# Patient Record
Sex: MALE | Race: WHITE | NOT HISPANIC OR LATINO
[De-identification: names, ages, dates, MRNs, and addresses within clinical notes are randomized per-mention and may not be internally consistent; named-entity substitution may affect disease eponyms.]

---

## 2017-08-16 ENCOUNTER — APPOINTMENT (OUTPATIENT)
Dept: ELECTROPHYSIOLOGY | Facility: CLINIC | Age: 82
End: 2017-08-16
Payer: MEDICARE

## 2017-08-16 VITALS
HEIGHT: 68 IN | HEART RATE: 73 BPM | SYSTOLIC BLOOD PRESSURE: 106 MMHG | BODY MASS INDEX: 25.61 KG/M2 | WEIGHT: 169 LBS | DIASTOLIC BLOOD PRESSURE: 73 MMHG

## 2017-08-16 PROCEDURE — 93283 PRGRMG EVAL IMPLANTABLE DFB: CPT

## 2017-11-21 ENCOUNTER — APPOINTMENT (OUTPATIENT)
Dept: ELECTROPHYSIOLOGY | Facility: CLINIC | Age: 82
End: 2017-11-21
Payer: MEDICARE

## 2017-11-21 VITALS — HEART RATE: 79 BPM | OXYGEN SATURATION: 98 % | DIASTOLIC BLOOD PRESSURE: 67 MMHG | SYSTOLIC BLOOD PRESSURE: 109 MMHG

## 2017-11-21 PROCEDURE — 93283 PRGRMG EVAL IMPLANTABLE DFB: CPT

## 2017-11-21 PROCEDURE — 93290 INTERROG DEV EVAL ICPMS IP: CPT | Mod: 26

## 2018-02-23 ENCOUNTER — APPOINTMENT (OUTPATIENT)
Dept: ELECTROPHYSIOLOGY | Facility: CLINIC | Age: 83
End: 2018-02-23
Payer: MEDICARE

## 2018-02-23 VITALS
DIASTOLIC BLOOD PRESSURE: 71 MMHG | BODY MASS INDEX: 25.61 KG/M2 | HEART RATE: 73 BPM | SYSTOLIC BLOOD PRESSURE: 112 MMHG | HEIGHT: 68 IN | WEIGHT: 169 LBS

## 2018-02-23 PROCEDURE — 93283 PRGRMG EVAL IMPLANTABLE DFB: CPT

## 2018-02-23 RX ORDER — CARVEDILOL 3.12 MG/1
3.12 TABLET, FILM COATED ORAL
Refills: 0 | Status: DISCONTINUED | COMMUNITY

## 2018-05-25 ENCOUNTER — APPOINTMENT (OUTPATIENT)
Dept: ELECTROPHYSIOLOGY | Facility: CLINIC | Age: 83
End: 2018-05-25
Payer: MEDICARE

## 2018-05-25 VITALS
HEART RATE: 74 BPM | SYSTOLIC BLOOD PRESSURE: 104 MMHG | HEIGHT: 68 IN | DIASTOLIC BLOOD PRESSURE: 72 MMHG | BODY MASS INDEX: 24.55 KG/M2 | WEIGHT: 162 LBS

## 2018-05-25 PROCEDURE — 93283 PRGRMG EVAL IMPLANTABLE DFB: CPT

## 2018-05-25 PROCEDURE — 93290 INTERROG DEV EVAL ICPMS IP: CPT | Mod: 26

## 2018-09-04 ENCOUNTER — APPOINTMENT (OUTPATIENT)
Dept: ELECTROPHYSIOLOGY | Facility: CLINIC | Age: 83
End: 2018-09-04
Payer: MEDICARE

## 2018-09-04 VITALS
HEART RATE: 75 BPM | BODY MASS INDEX: 24.25 KG/M2 | WEIGHT: 160 LBS | HEIGHT: 68 IN | DIASTOLIC BLOOD PRESSURE: 76 MMHG | SYSTOLIC BLOOD PRESSURE: 117 MMHG

## 2018-09-04 PROCEDURE — 93290 INTERROG DEV EVAL ICPMS IP: CPT | Mod: 26

## 2018-09-04 PROCEDURE — 93283 PRGRMG EVAL IMPLANTABLE DFB: CPT

## 2018-12-07 ENCOUNTER — APPOINTMENT (OUTPATIENT)
Dept: ELECTROPHYSIOLOGY | Facility: CLINIC | Age: 83
End: 2018-12-07
Payer: MEDICARE

## 2018-12-07 PROCEDURE — 93290 INTERROG DEV EVAL ICPMS IP: CPT | Mod: 26

## 2018-12-07 PROCEDURE — 93283 PRGRMG EVAL IMPLANTABLE DFB: CPT

## 2019-05-16 ENCOUNTER — APPOINTMENT (OUTPATIENT)
Dept: ELECTROPHYSIOLOGY | Facility: CLINIC | Age: 84
End: 2019-05-16
Payer: MEDICARE

## 2019-05-16 VITALS
WEIGHT: 161 LBS | BODY MASS INDEX: 24.4 KG/M2 | HEART RATE: 72 BPM | HEIGHT: 68 IN | DIASTOLIC BLOOD PRESSURE: 71 MMHG | SYSTOLIC BLOOD PRESSURE: 110 MMHG

## 2019-05-16 PROCEDURE — 93290 INTERROG DEV EVAL ICPMS IP: CPT | Mod: 26

## 2019-05-16 PROCEDURE — 93283 PRGRMG EVAL IMPLANTABLE DFB: CPT

## 2019-07-16 ENCOUNTER — APPOINTMENT (OUTPATIENT)
Dept: ELECTROPHYSIOLOGY | Facility: CLINIC | Age: 84
End: 2019-07-16
Payer: MEDICARE

## 2019-07-16 VITALS
DIASTOLIC BLOOD PRESSURE: 63 MMHG | WEIGHT: 157.44 LBS | HEIGHT: 68 IN | HEART RATE: 79 BPM | BODY MASS INDEX: 23.86 KG/M2 | SYSTOLIC BLOOD PRESSURE: 98 MMHG

## 2019-07-16 PROCEDURE — 93290 INTERROG DEV EVAL ICPMS IP: CPT | Mod: 26

## 2019-07-16 PROCEDURE — 93283 PRGRMG EVAL IMPLANTABLE DFB: CPT

## 2019-08-28 ENCOUNTER — APPOINTMENT (OUTPATIENT)
Dept: ELECTROPHYSIOLOGY | Facility: CLINIC | Age: 84
End: 2019-08-28
Payer: MEDICARE

## 2019-08-28 VITALS
BODY MASS INDEX: 23.79 KG/M2 | HEART RATE: 71 BPM | SYSTOLIC BLOOD PRESSURE: 97 MMHG | DIASTOLIC BLOOD PRESSURE: 65 MMHG | WEIGHT: 157 LBS | HEIGHT: 68 IN

## 2019-08-28 PROCEDURE — 93290 INTERROG DEV EVAL ICPMS IP: CPT | Mod: 26

## 2019-08-28 PROCEDURE — 93283 PRGRMG EVAL IMPLANTABLE DFB: CPT

## 2019-10-09 ENCOUNTER — APPOINTMENT (OUTPATIENT)
Dept: ELECTROPHYSIOLOGY | Facility: CLINIC | Age: 84
End: 2019-10-09
Payer: MEDICARE

## 2019-10-09 VITALS
BODY MASS INDEX: 23.64 KG/M2 | WEIGHT: 156 LBS | DIASTOLIC BLOOD PRESSURE: 56 MMHG | HEART RATE: 73 BPM | HEIGHT: 68 IN | SYSTOLIC BLOOD PRESSURE: 100 MMHG | OXYGEN SATURATION: 100 %

## 2019-10-09 PROCEDURE — 93280 PM DEVICE PROGR EVAL DUAL: CPT

## 2019-11-13 ENCOUNTER — APPOINTMENT (OUTPATIENT)
Dept: ELECTROPHYSIOLOGY | Facility: CLINIC | Age: 84
End: 2019-11-13
Payer: MEDICARE

## 2019-11-13 VITALS
WEIGHT: 163 LBS | SYSTOLIC BLOOD PRESSURE: 119 MMHG | HEART RATE: 76 BPM | BODY MASS INDEX: 24.78 KG/M2 | DIASTOLIC BLOOD PRESSURE: 72 MMHG

## 2019-11-13 DIAGNOSIS — C61 MALIGNANT NEOPLASM OF PROSTATE: ICD-10-CM

## 2019-11-13 DIAGNOSIS — M10.9 GOUT, UNSPECIFIED: ICD-10-CM

## 2019-11-13 PROCEDURE — 93283 PRGRMG EVAL IMPLANTABLE DFB: CPT

## 2019-11-21 ENCOUNTER — APPOINTMENT (OUTPATIENT)
Dept: ELECTROPHYSIOLOGY | Facility: CLINIC | Age: 84
End: 2019-11-21
Payer: MEDICARE

## 2019-11-21 VITALS
SYSTOLIC BLOOD PRESSURE: 100 MMHG | DIASTOLIC BLOOD PRESSURE: 58 MMHG | HEART RATE: 77 BPM | OXYGEN SATURATION: 98 % | HEIGHT: 66 IN | WEIGHT: 158 LBS | BODY MASS INDEX: 25.39 KG/M2

## 2019-11-21 PROCEDURE — 93283 PRGRMG EVAL IMPLANTABLE DFB: CPT

## 2019-11-26 ENCOUNTER — APPOINTMENT (OUTPATIENT)
Dept: ELECTROPHYSIOLOGY | Facility: CLINIC | Age: 84
End: 2019-11-26

## 2019-12-02 ENCOUNTER — APPOINTMENT (OUTPATIENT)
Dept: ELECTROPHYSIOLOGY | Facility: CLINIC | Age: 84
End: 2019-12-02
Payer: MEDICARE

## 2019-12-02 VITALS
HEART RATE: 78 BPM | WEIGHT: 159.38 LBS | SYSTOLIC BLOOD PRESSURE: 101 MMHG | DIASTOLIC BLOOD PRESSURE: 70 MMHG | BODY MASS INDEX: 25.61 KG/M2 | HEIGHT: 66 IN | OXYGEN SATURATION: 99 %

## 2019-12-02 PROCEDURE — 93290 INTERROG DEV EVAL ICPMS IP: CPT

## 2019-12-02 PROCEDURE — 93283 PRGRMG EVAL IMPLANTABLE DFB: CPT

## 2019-12-04 ENCOUNTER — OUTPATIENT (OUTPATIENT)
Dept: OUTPATIENT SERVICES | Facility: HOSPITAL | Age: 84
LOS: 1 days | End: 2019-12-04
Payer: MEDICARE

## 2019-12-04 VITALS
DIASTOLIC BLOOD PRESSURE: 59 MMHG | RESPIRATION RATE: 18 BRPM | OXYGEN SATURATION: 100 % | WEIGHT: 142.42 LBS | SYSTOLIC BLOOD PRESSURE: 107 MMHG | HEIGHT: 65 IN | TEMPERATURE: 97 F | HEART RATE: 78 BPM

## 2019-12-04 DIAGNOSIS — Z90.89 ACQUIRED ABSENCE OF OTHER ORGANS: Chronic | ICD-10-CM

## 2019-12-04 DIAGNOSIS — Z98.890 OTHER SPECIFIED POSTPROCEDURAL STATES: Chronic | ICD-10-CM

## 2019-12-04 DIAGNOSIS — Z01.818 ENCOUNTER FOR OTHER PREPROCEDURAL EXAMINATION: ICD-10-CM

## 2019-12-04 DIAGNOSIS — I47.2 VENTRICULAR TACHYCARDIA: ICD-10-CM

## 2019-12-04 DIAGNOSIS — Z95.810 PRESENCE OF AUTOMATIC (IMPLANTABLE) CARDIAC DEFIBRILLATOR: Chronic | ICD-10-CM

## 2019-12-04 DIAGNOSIS — Z87.2 PERSONAL HISTORY OF DISEASES OF THE SKIN AND SUBCUTANEOUS TISSUE: Chronic | ICD-10-CM

## 2019-12-04 DIAGNOSIS — Z41.9 ENCOUNTER FOR PROCEDURE FOR PURPOSES OTHER THAN REMEDYING HEALTH STATE, UNSPECIFIED: Chronic | ICD-10-CM

## 2019-12-04 LAB
ANION GAP SERPL CALC-SCNC: 4 MMOL/L — LOW (ref 5–17)
BASOPHILS # BLD AUTO: 0.05 K/UL — SIGNIFICANT CHANGE UP (ref 0–0.2)
BASOPHILS NFR BLD AUTO: 0.6 % — SIGNIFICANT CHANGE UP (ref 0–2)
BUN SERPL-MCNC: 25 MG/DL — HIGH (ref 7–23)
CALCIUM SERPL-MCNC: 9.2 MG/DL — SIGNIFICANT CHANGE UP (ref 8.5–10.1)
CHLORIDE SERPL-SCNC: 105 MMOL/L — SIGNIFICANT CHANGE UP (ref 96–108)
CO2 SERPL-SCNC: 29 MMOL/L — SIGNIFICANT CHANGE UP (ref 22–31)
CREAT SERPL-MCNC: 0.96 MG/DL — SIGNIFICANT CHANGE UP (ref 0.5–1.3)
EOSINOPHIL # BLD AUTO: 0.4 K/UL — SIGNIFICANT CHANGE UP (ref 0–0.5)
EOSINOPHIL NFR BLD AUTO: 5.1 % — SIGNIFICANT CHANGE UP (ref 0–6)
GLUCOSE SERPL-MCNC: 110 MG/DL — HIGH (ref 70–99)
HCT VFR BLD CALC: 42 % — SIGNIFICANT CHANGE UP (ref 39–50)
HGB BLD-MCNC: 13.5 G/DL — SIGNIFICANT CHANGE UP (ref 13–17)
IMM GRANULOCYTES NFR BLD AUTO: 0.3 % — SIGNIFICANT CHANGE UP (ref 0–1.5)
LYMPHOCYTES # BLD AUTO: 1.29 K/UL — SIGNIFICANT CHANGE UP (ref 1–3.3)
LYMPHOCYTES # BLD AUTO: 16.6 % — SIGNIFICANT CHANGE UP (ref 13–44)
MCHC RBC-ENTMCNC: 29.9 PG — SIGNIFICANT CHANGE UP (ref 27–34)
MCHC RBC-ENTMCNC: 32.1 GM/DL — SIGNIFICANT CHANGE UP (ref 32–36)
MCV RBC AUTO: 92.9 FL — SIGNIFICANT CHANGE UP (ref 80–100)
MONOCYTES # BLD AUTO: 0.7 K/UL — SIGNIFICANT CHANGE UP (ref 0–0.9)
MONOCYTES NFR BLD AUTO: 9 % — SIGNIFICANT CHANGE UP (ref 2–14)
MRSA PCR RESULT.: SIGNIFICANT CHANGE UP
NEUTROPHILS # BLD AUTO: 5.33 K/UL — SIGNIFICANT CHANGE UP (ref 1.8–7.4)
NEUTROPHILS NFR BLD AUTO: 68.4 % — SIGNIFICANT CHANGE UP (ref 43–77)
PLATELET # BLD AUTO: 224 K/UL — SIGNIFICANT CHANGE UP (ref 150–400)
POTASSIUM SERPL-MCNC: 4.4 MMOL/L — SIGNIFICANT CHANGE UP (ref 3.5–5.3)
POTASSIUM SERPL-SCNC: 4.4 MMOL/L — SIGNIFICANT CHANGE UP (ref 3.5–5.3)
RBC # BLD: 4.52 M/UL — SIGNIFICANT CHANGE UP (ref 4.2–5.8)
RBC # FLD: 13.4 % — SIGNIFICANT CHANGE UP (ref 10.3–14.5)
S AUREUS DNA NOSE QL NAA+PROBE: SIGNIFICANT CHANGE UP
SODIUM SERPL-SCNC: 138 MMOL/L — SIGNIFICANT CHANGE UP (ref 135–145)
WBC # BLD: 7.79 K/UL — SIGNIFICANT CHANGE UP (ref 3.8–10.5)
WBC # FLD AUTO: 7.79 K/UL — SIGNIFICANT CHANGE UP (ref 3.8–10.5)

## 2019-12-04 PROCEDURE — 36415 COLL VENOUS BLD VENIPUNCTURE: CPT

## 2019-12-04 PROCEDURE — 93010 ELECTROCARDIOGRAM REPORT: CPT

## 2019-12-04 PROCEDURE — 71046 X-RAY EXAM CHEST 2 VIEWS: CPT

## 2019-12-04 PROCEDURE — 87641 MR-STAPH DNA AMP PROBE: CPT

## 2019-12-04 PROCEDURE — 85025 COMPLETE CBC W/AUTO DIFF WBC: CPT

## 2019-12-04 PROCEDURE — 71046 X-RAY EXAM CHEST 2 VIEWS: CPT | Mod: 26

## 2019-12-04 PROCEDURE — 80048 BASIC METABOLIC PNL TOTAL CA: CPT

## 2019-12-04 PROCEDURE — 93005 ELECTROCARDIOGRAM TRACING: CPT

## 2019-12-04 PROCEDURE — G0463: CPT | Mod: 25

## 2019-12-04 PROCEDURE — 87640 STAPH A DNA AMP PROBE: CPT

## 2019-12-04 NOTE — H&P PST ADULT - ASSESSMENT
86 yo male scheduled for ICD generator change on 12/6/19 with Dr Jackson     1. CBC w diff, BMP, MRSA nares  2. EKG  3. discussed EZ sponges & mupirocin instructions  4. medication & day of procedure instructions as per EP staff  5. CXR 86 yo male scheduled for ICD generator change on 12/6/19 with Dr Jackson     1. CBC w diff, BMP, MRSA nares  2. EKG  3. discussed EZ sponges & mupirocin instructions  4. medication & day of procedure instructions as per EP staff. copy of printed instructions from EP given to patient  5. CXR

## 2019-12-04 NOTE — H&P PST ADULT - NSICDXPASTSURGICALHX_GEN_ALL_CORE_FT
PAST SURGICAL HISTORY:  Elective surgery right middle finger surgery, no hardware    H/O pilonidal cyst     H/O prostate biopsy cancer treated with prostate seeding    History of tonsillectomy as child    Implantable cardioverter-defibrillator (ICD) in situ 2008

## 2019-12-04 NOTE — H&P PST ADULT - NSANTHAGERD_ENT_A_CORE
-- Message is from the [facility]--    Provider paged via [employer] Interactive Documentation - The below message was copied and pasted from a [employer] page:    [phone] ACC URGENT CALLER NAME: DONTRELL RE: DONTRELL MONTGOMERY PATIENT 1958 PATIENT PCP: MILAD BANERJEE PATIENT CALLED REGARDING A MEDICATION REFILL DUE TO PATIENT BEING OUT OF MEDICATION FOR TRAZODONE (DESYREL) 150 MG TABLET NEED MORE DOSAGE  MG, AND ALSO FOR REFILL FOR VENLAFAXINE XR (EFFEXOR XR) 150 MG 24 HR CAPSULE. PATIENT ASSIST TO CALL PCP PATIENT IS AGITATED, PLEASE CONTACT PATIENT OR SENT SCRIPT [facility] ([facility] [phone]. FORMID:[id number]      
Yes

## 2019-12-04 NOTE — H&P PST ADULT - HISTORY OF PRESENT ILLNESS
84 yo male presents to PST. Wife Debbi reports PPM/ ICD originally placed 2008 for ventricular tachycardia. Pt states he is coming in for battery replacement. Denies chest pain, SOB or syncope.

## 2019-12-04 NOTE — H&P PST ADULT - NSANTHOSAYNRD_GEN_A_CORE
No. TACOS screening performed.  STOP BANG Legend: 0-2 = LOW Risk; 3-4 = INTERMEDIATE Risk; 5-8 = HIGH Risk

## 2019-12-04 NOTE — H&P PST ADULT - NSICDXPASTMEDICALHX_GEN_ALL_CORE_FT
PAST MEDICAL HISTORY:  H/O ventricular tachycardia PPM placed 2008    Hypertension     Myocardial infarction 54 years old    Prostate cancer

## 2019-12-05 DIAGNOSIS — Z01.818 ENCOUNTER FOR OTHER PREPROCEDURAL EXAMINATION: ICD-10-CM

## 2019-12-05 DIAGNOSIS — I47.2 VENTRICULAR TACHYCARDIA: ICD-10-CM

## 2019-12-05 PROBLEM — I10 ESSENTIAL (PRIMARY) HYPERTENSION: Chronic | Status: ACTIVE | Noted: 2019-12-04

## 2019-12-05 PROBLEM — Z86.79 PERSONAL HISTORY OF OTHER DISEASES OF THE CIRCULATORY SYSTEM: Chronic | Status: ACTIVE | Noted: 2019-12-04

## 2019-12-05 PROBLEM — I21.9 ACUTE MYOCARDIAL INFARCTION, UNSPECIFIED: Chronic | Status: ACTIVE | Noted: 2019-12-04

## 2019-12-05 PROBLEM — C61 MALIGNANT NEOPLASM OF PROSTATE: Chronic | Status: ACTIVE | Noted: 2019-12-04

## 2019-12-06 ENCOUNTER — OUTPATIENT (OUTPATIENT)
Dept: OUTPATIENT SERVICES | Facility: HOSPITAL | Age: 84
LOS: 1 days | Discharge: HOME | End: 2019-12-06
Payer: MEDICARE

## 2019-12-06 VITALS
SYSTOLIC BLOOD PRESSURE: 123 MMHG | WEIGHT: 143.3 LBS | DIASTOLIC BLOOD PRESSURE: 70 MMHG | TEMPERATURE: 97 F | OXYGEN SATURATION: 100 % | HEIGHT: 65 IN | HEART RATE: 72 BPM | RESPIRATION RATE: 18 BRPM

## 2019-12-06 VITALS
SYSTOLIC BLOOD PRESSURE: 113 MMHG | HEART RATE: 70 BPM | DIASTOLIC BLOOD PRESSURE: 69 MMHG | OXYGEN SATURATION: 100 % | RESPIRATION RATE: 18 BRPM

## 2019-12-06 DIAGNOSIS — I47.2 VENTRICULAR TACHYCARDIA: ICD-10-CM

## 2019-12-06 DIAGNOSIS — Z98.890 OTHER SPECIFIED POSTPROCEDURAL STATES: Chronic | ICD-10-CM

## 2019-12-06 DIAGNOSIS — Z87.2 PERSONAL HISTORY OF DISEASES OF THE SKIN AND SUBCUTANEOUS TISSUE: Chronic | ICD-10-CM

## 2019-12-06 DIAGNOSIS — Z95.810 PRESENCE OF AUTOMATIC (IMPLANTABLE) CARDIAC DEFIBRILLATOR: Chronic | ICD-10-CM

## 2019-12-06 DIAGNOSIS — Z41.9 ENCOUNTER FOR PROCEDURE FOR PURPOSES OTHER THAN REMEDYING HEALTH STATE, UNSPECIFIED: Chronic | ICD-10-CM

## 2019-12-06 DIAGNOSIS — Z90.89 ACQUIRED ABSENCE OF OTHER ORGANS: Chronic | ICD-10-CM

## 2019-12-06 PROCEDURE — 33228 REMV&REPLC PM GEN DUAL LEAD: CPT

## 2019-12-06 PROCEDURE — C1721: CPT

## 2019-12-06 PROCEDURE — 33263 RMVL & RPLCMT DFB GEN 2 LEAD: CPT

## 2019-12-06 PROCEDURE — 93005 ELECTROCARDIOGRAM TRACING: CPT | Mod: XU

## 2019-12-06 PROCEDURE — 93010 ELECTROCARDIOGRAM REPORT: CPT

## 2019-12-06 RX ORDER — CEPHALEXIN 500 MG
500 CAPSULE ORAL EVERY 8 HOURS
Refills: 0 | Status: DISCONTINUED | OUTPATIENT
Start: 2019-12-06 | End: 2019-12-06

## 2019-12-06 RX ORDER — CEFAZOLIN SODIUM 1 G
2000 VIAL (EA) INJECTION ONCE
Refills: 0 | Status: COMPLETED | OUTPATIENT
Start: 2019-12-06 | End: 2019-12-06

## 2019-12-06 RX ORDER — CEPHALEXIN 500 MG
1 CAPSULE ORAL
Qty: 0 | Refills: 0 | DISCHARGE
Start: 2019-12-06

## 2019-12-06 RX ADMIN — Medication 100 MILLIGRAM(S): at 08:56

## 2019-12-06 NOTE — ASU PATIENT PROFILE, ADULT - PSH
Elective surgery  right middle finger surgery, no hardware  H/O pilonidal cyst    H/O prostate biopsy  cancer treated with prostate seeding  History of tonsillectomy  as child  Implantable cardioverter-defibrillator (ICD) in situ  2008

## 2019-12-06 NOTE — PACU DISCHARGE NOTE - COMMENTS
pt discharged home via wheelchair, accompanied by spouse, pt received discharge instructions, fallow up appointment, IV removed, bedside monitor given to the patient.

## 2019-12-06 NOTE — PROCEDURE NOTE - NSICDXPROCEDURE_GEN_ALL_CORE_FT
PROCEDURES:  Replacement, pulse generator for dual-lead ICD system 06-Dec-2019 09:56:00  rAis Jackson

## 2019-12-06 NOTE — ASU PATIENT PROFILE, ADULT - PMH
H/O ventricular tachycardia  PPM placed 2008  Hypertension    Myocardial infarction  54 years old  Prostate cancer

## 2019-12-11 DIAGNOSIS — Z85.048 PERSONAL HISTORY OF OTHER MALIGNANT NEOPLASM OF RECTUM, RECTOSIGMOID JUNCTION, AND ANUS: ICD-10-CM

## 2019-12-11 DIAGNOSIS — Z88.1 ALLERGY STATUS TO OTHER ANTIBIOTIC AGENTS STATUS: ICD-10-CM

## 2019-12-11 DIAGNOSIS — Z45.02 ENCOUNTER FOR ADJUSTMENT AND MANAGEMENT OF AUTOMATIC IMPLANTABLE CARDIAC DEFIBRILLATOR: ICD-10-CM

## 2019-12-11 DIAGNOSIS — I10 ESSENTIAL (PRIMARY) HYPERTENSION: ICD-10-CM

## 2019-12-11 DIAGNOSIS — Z85.46 PERSONAL HISTORY OF MALIGNANT NEOPLASM OF PROSTATE: ICD-10-CM

## 2019-12-11 DIAGNOSIS — I25.2 OLD MYOCARDIAL INFARCTION: ICD-10-CM

## 2019-12-20 ENCOUNTER — APPOINTMENT (OUTPATIENT)
Dept: ELECTROPHYSIOLOGY | Facility: CLINIC | Age: 84
End: 2019-12-20
Payer: MEDICARE

## 2019-12-20 VITALS — SYSTOLIC BLOOD PRESSURE: 99 MMHG | DIASTOLIC BLOOD PRESSURE: 64 MMHG | OXYGEN SATURATION: 98 % | HEART RATE: 75 BPM

## 2019-12-20 PROCEDURE — 93283 PRGRMG EVAL IMPLANTABLE DFB: CPT

## 2020-03-04 NOTE — ASU PREOP CHECKLIST - HOW ADMINISTERED
Overall: No fever/chills/sweats.    Tolerating PO diet  Any cough? No    Incision: Denies redness, swelling or drainage    Pain Control: Controlled with oral analgesic                        Needs refill    Nausea/Vomitting:  Denies    Bowel Movements: Regular      Plans for returning to work:  Patient has returned to work.  Patient plans on returning:      Self Administrated

## 2020-03-17 ENCOUNTER — APPOINTMENT (OUTPATIENT)
Dept: ELECTROPHYSIOLOGY | Facility: CLINIC | Age: 85
End: 2020-03-17
Payer: MEDICARE

## 2020-03-17 PROCEDURE — 93295 DEV INTERROG REMOTE 1/2/MLT: CPT

## 2020-03-17 PROCEDURE — 93296 REM INTERROG EVL PM/IDS: CPT

## 2020-06-23 ENCOUNTER — APPOINTMENT (OUTPATIENT)
Dept: ELECTROPHYSIOLOGY | Facility: CLINIC | Age: 85
End: 2020-06-23

## 2020-06-25 ENCOUNTER — APPOINTMENT (OUTPATIENT)
Dept: ELECTROPHYSIOLOGY | Facility: CLINIC | Age: 85
End: 2020-06-25
Payer: MEDICARE

## 2020-06-26 PROCEDURE — 93296 REM INTERROG EVL PM/IDS: CPT

## 2020-06-26 PROCEDURE — 93295 DEV INTERROG REMOTE 1/2/MLT: CPT

## 2020-07-03 ENCOUNTER — EMERGENCY (EMERGENCY)
Facility: HOSPITAL | Age: 85
LOS: 0 days | Discharge: HOME | End: 2020-07-04
Attending: EMERGENCY MEDICINE
Payer: MEDICARE

## 2020-07-03 VITALS
OXYGEN SATURATION: 100 % | DIASTOLIC BLOOD PRESSURE: 66 MMHG | RESPIRATION RATE: 18 BRPM | SYSTOLIC BLOOD PRESSURE: 123 MMHG | HEART RATE: 85 BPM | TEMPERATURE: 98 F

## 2020-07-03 DIAGNOSIS — Z95.810 PRESENCE OF AUTOMATIC (IMPLANTABLE) CARDIAC DEFIBRILLATOR: Chronic | ICD-10-CM

## 2020-07-03 DIAGNOSIS — I47.2 VENTRICULAR TACHYCARDIA: ICD-10-CM

## 2020-07-03 DIAGNOSIS — Z87.2 PERSONAL HISTORY OF DISEASES OF THE SKIN AND SUBCUTANEOUS TISSUE: Chronic | ICD-10-CM

## 2020-07-03 DIAGNOSIS — I25.2 OLD MYOCARDIAL INFARCTION: ICD-10-CM

## 2020-07-03 DIAGNOSIS — Z41.9 ENCOUNTER FOR PROCEDURE FOR PURPOSES OTHER THAN REMEDYING HEALTH STATE, UNSPECIFIED: Chronic | ICD-10-CM

## 2020-07-03 DIAGNOSIS — Z95.810 PRESENCE OF AUTOMATIC (IMPLANTABLE) CARDIAC DEFIBRILLATOR: ICD-10-CM

## 2020-07-03 DIAGNOSIS — Z98.890 OTHER SPECIFIED POSTPROCEDURAL STATES: Chronic | ICD-10-CM

## 2020-07-03 DIAGNOSIS — Z85.46 PERSONAL HISTORY OF MALIGNANT NEOPLASM OF PROSTATE: ICD-10-CM

## 2020-07-03 DIAGNOSIS — Z88.8 ALLERGY STATUS TO OTHER DRUGS, MEDICAMENTS AND BIOLOGICAL SUBSTANCES: ICD-10-CM

## 2020-07-03 DIAGNOSIS — Z90.89 ACQUIRED ABSENCE OF OTHER ORGANS: Chronic | ICD-10-CM

## 2020-07-03 DIAGNOSIS — Z79.82 LONG TERM (CURRENT) USE OF ASPIRIN: ICD-10-CM

## 2020-07-03 DIAGNOSIS — I10 ESSENTIAL (PRIMARY) HYPERTENSION: ICD-10-CM

## 2020-07-03 DIAGNOSIS — I45.10 UNSPECIFIED RIGHT BUNDLE-BRANCH BLOCK: ICD-10-CM

## 2020-07-03 LAB
BASOPHILS # BLD AUTO: 0.05 K/UL — SIGNIFICANT CHANGE UP (ref 0–0.2)
BASOPHILS NFR BLD AUTO: 0.6 % — SIGNIFICANT CHANGE UP (ref 0–2)
EOSINOPHIL # BLD AUTO: 0.39 K/UL — SIGNIFICANT CHANGE UP (ref 0–0.5)
EOSINOPHIL NFR BLD AUTO: 4.8 % — SIGNIFICANT CHANGE UP (ref 0–6)
HCT VFR BLD CALC: 38.5 % — LOW (ref 39–50)
HGB BLD-MCNC: 12.4 G/DL — LOW (ref 13–17)
IMM GRANULOCYTES NFR BLD AUTO: 0.4 % — SIGNIFICANT CHANGE UP (ref 0–1.5)
LYMPHOCYTES # BLD AUTO: 1.64 K/UL — SIGNIFICANT CHANGE UP (ref 1–3.3)
LYMPHOCYTES # BLD AUTO: 20.1 % — SIGNIFICANT CHANGE UP (ref 13–44)
MCHC RBC-ENTMCNC: 30.2 PG — SIGNIFICANT CHANGE UP (ref 27–34)
MCHC RBC-ENTMCNC: 32.2 GM/DL — SIGNIFICANT CHANGE UP (ref 32–36)
MCV RBC AUTO: 93.9 FL — SIGNIFICANT CHANGE UP (ref 80–100)
MONOCYTES # BLD AUTO: 0.89 K/UL — SIGNIFICANT CHANGE UP (ref 0–0.9)
MONOCYTES NFR BLD AUTO: 10.9 % — SIGNIFICANT CHANGE UP (ref 2–14)
NEUTROPHILS # BLD AUTO: 5.14 K/UL — SIGNIFICANT CHANGE UP (ref 1.8–7.4)
NEUTROPHILS NFR BLD AUTO: 63.2 % — SIGNIFICANT CHANGE UP (ref 43–77)
PLATELET # BLD AUTO: 180 K/UL — SIGNIFICANT CHANGE UP (ref 150–400)
RBC # BLD: 4.1 M/UL — LOW (ref 4.2–5.8)
RBC # FLD: 13.5 % — SIGNIFICANT CHANGE UP (ref 10.3–14.5)
WBC # BLD: 8.14 K/UL — SIGNIFICANT CHANGE UP (ref 3.8–10.5)
WBC # FLD AUTO: 8.14 K/UL — SIGNIFICANT CHANGE UP (ref 3.8–10.5)

## 2020-07-03 PROCEDURE — 99284 EMERGENCY DEPT VISIT MOD MDM: CPT

## 2020-07-03 PROCEDURE — 87635 SARS-COV-2 COVID-19 AMP PRB: CPT

## 2020-07-03 PROCEDURE — 93005 ELECTROCARDIOGRAM TRACING: CPT

## 2020-07-03 PROCEDURE — 85025 COMPLETE CBC W/AUTO DIFF WBC: CPT

## 2020-07-03 PROCEDURE — 80048 BASIC METABOLIC PNL TOTAL CA: CPT

## 2020-07-03 PROCEDURE — 84484 ASSAY OF TROPONIN QUANT: CPT

## 2020-07-03 PROCEDURE — G0378: CPT

## 2020-07-03 PROCEDURE — 83735 ASSAY OF MAGNESIUM: CPT

## 2020-07-03 PROCEDURE — 36415 COLL VENOUS BLD VENIPUNCTURE: CPT

## 2020-07-03 PROCEDURE — 86769 SARS-COV-2 COVID-19 ANTIBODY: CPT

## 2020-07-03 PROCEDURE — 71045 X-RAY EXAM CHEST 1 VIEW: CPT

## 2020-07-03 PROCEDURE — 99285 EMERGENCY DEPT VISIT HI MDM: CPT | Mod: 25

## 2020-07-03 PROCEDURE — 93010 ELECTROCARDIOGRAM REPORT: CPT

## 2020-07-03 PROCEDURE — 85610 PROTHROMBIN TIME: CPT

## 2020-07-03 PROCEDURE — 85730 THROMBOPLASTIN TIME PARTIAL: CPT

## 2020-07-03 NOTE — ED ADULT TRIAGE NOTE - CHIEF COMPLAINT QUOTE
Pt presents to er with [facility] with defibrillator firing x1 at 23:00 this evening, pt denies chest pain, sob, dizziness at this time, sitting up alert in stretcher, color appropriate for ethnicity, EKG strip given to .

## 2020-07-04 ENCOUNTER — TRANSCRIPTION ENCOUNTER (OUTPATIENT)
Age: 85
End: 2020-07-04

## 2020-07-04 VITALS
TEMPERATURE: 98 F | SYSTOLIC BLOOD PRESSURE: 127 MMHG | DIASTOLIC BLOOD PRESSURE: 74 MMHG | HEART RATE: 71 BPM | OXYGEN SATURATION: 99 % | RESPIRATION RATE: 18 BRPM

## 2020-07-04 LAB
ANION GAP SERPL CALC-SCNC: 4 MMOL/L — LOW (ref 5–17)
APTT BLD: 35.3 SEC — SIGNIFICANT CHANGE UP (ref 27.5–35.5)
BUN SERPL-MCNC: 25 MG/DL — HIGH (ref 7–23)
CALCIUM SERPL-MCNC: 8.7 MG/DL — SIGNIFICANT CHANGE UP (ref 8.5–10.1)
CHLORIDE SERPL-SCNC: 107 MMOL/L — SIGNIFICANT CHANGE UP (ref 96–108)
CO2 SERPL-SCNC: 29 MMOL/L — SIGNIFICANT CHANGE UP (ref 22–31)
CREAT SERPL-MCNC: 0.99 MG/DL — SIGNIFICANT CHANGE UP (ref 0.5–1.3)
GLUCOSE SERPL-MCNC: 96 MG/DL — SIGNIFICANT CHANGE UP (ref 70–99)
INR BLD: 1.11 RATIO — SIGNIFICANT CHANGE UP (ref 0.88–1.16)
MAGNESIUM SERPL-MCNC: 2.1 MG/DL — SIGNIFICANT CHANGE UP (ref 1.6–2.6)
POTASSIUM SERPL-MCNC: 4.1 MMOL/L — SIGNIFICANT CHANGE UP (ref 3.5–5.3)
POTASSIUM SERPL-SCNC: 4.1 MMOL/L — SIGNIFICANT CHANGE UP (ref 3.5–5.3)
PROTHROM AB SERPL-ACNC: 12.8 SEC — SIGNIFICANT CHANGE UP (ref 10.6–13.6)
SARS-COV-2 IGG SERPL QL IA: NEGATIVE — SIGNIFICANT CHANGE UP
SARS-COV-2 IGM SERPL IA-ACNC: 0.1 INDEX — SIGNIFICANT CHANGE UP
SARS-COV-2 RNA SPEC QL NAA+PROBE: SIGNIFICANT CHANGE UP
SODIUM SERPL-SCNC: 140 MMOL/L — SIGNIFICANT CHANGE UP (ref 135–145)
TROPONIN I SERPL-MCNC: 0.09 NG/ML — HIGH (ref 0.01–0.04)
TROPONIN I SERPL-MCNC: <0.015 NG/ML — SIGNIFICANT CHANGE UP (ref 0.01–0.04)

## 2020-07-04 PROCEDURE — 99213 OFFICE O/P EST LOW 20 MIN: CPT

## 2020-07-04 PROCEDURE — 99220: CPT

## 2020-07-04 PROCEDURE — 71045 X-RAY EXAM CHEST 1 VIEW: CPT | Mod: 26

## 2020-07-04 RX ORDER — LISINOPRIL 2.5 MG/1
2.5 TABLET ORAL DAILY
Refills: 0 | Status: DISCONTINUED | OUTPATIENT
Start: 2020-07-04 | End: 2020-07-04

## 2020-07-04 RX ORDER — ALLOPURINOL 300 MG
100 TABLET ORAL
Refills: 0 | Status: DISCONTINUED | OUTPATIENT
Start: 2020-07-04 | End: 2020-07-04

## 2020-07-04 RX ORDER — ASPIRIN/CALCIUM CARB/MAGNESIUM 324 MG
325 TABLET ORAL DAILY
Refills: 0 | Status: DISCONTINUED | OUTPATIENT
Start: 2020-07-04 | End: 2020-07-04

## 2020-07-04 RX ORDER — CHOLECALCIFEROL (VITAMIN D3) 125 MCG
1000 CAPSULE ORAL DAILY
Refills: 0 | Status: DISCONTINUED | OUTPATIENT
Start: 2020-07-04 | End: 2020-07-04

## 2020-07-04 RX ORDER — CARVEDILOL PHOSPHATE 80 MG/1
6.25 CAPSULE, EXTENDED RELEASE ORAL EVERY 12 HOURS
Refills: 0 | Status: DISCONTINUED | OUTPATIENT
Start: 2020-07-04 | End: 2020-07-04

## 2020-07-04 RX ADMIN — Medication 1000 UNIT(S): at 09:41

## 2020-07-04 RX ADMIN — Medication 325 MILLIGRAM(S): at 09:41

## 2020-07-04 RX ADMIN — LISINOPRIL 2.5 MILLIGRAM(S): 2.5 TABLET ORAL at 09:41

## 2020-07-04 RX ADMIN — Medication 100 MILLIGRAM(S): at 09:41

## 2020-07-04 RX ADMIN — CARVEDILOL PHOSPHATE 6.25 MILLIGRAM(S): 80 CAPSULE, EXTENDED RELEASE ORAL at 09:41

## 2020-07-04 NOTE — DISCHARGE NOTE PROVIDER - HOSPITAL COURSE
Patient admitted overnight after defibrillator shocked him.  Denies having any sx prior to shock.     Feeling well this morning and eager to have icd interrogated and go home.    Discussed w/ Cardiology and EP-  monomorphic VT isolated event.  recent negative stress test.   Continue current meds and follow up outpatient w/ Dr. Almanzar this week for further ischemic workup.  Discussed w/ Dr. Almanzar- stable for d/c planning home.  Patient does not want to stay for echo therefore will have in office this week.

## 2020-07-04 NOTE — DISCHARGE NOTE PROVIDER - CARE PROVIDER_API CALL
Hersno Almanzar  CARDIOVASCULAR DISEASE  [address]  [place] [address]  Phone: [phone]  Fax: [phone]  Follow Up Time:

## 2020-07-04 NOTE — DISCHARGE NOTE PROVIDER - NSDCCPCAREPLAN_GEN_ALL_CORE_FT
PRINCIPAL DISCHARGE DIAGNOSIS  Diagnosis: Defibrillator discharge  Assessment and Plan of Treatment: Interrogation revealed  monomorphic V tach isolated event.  You were seen by Electrophysiologist and Cardiologist.   Follow up with Dr. Almanzar on Monday (call office) to schedule echo and further workup. Return to ED if device shocks you  again or if you have chest pain, shortness of breath, palpitations.

## 2020-07-04 NOTE — ED ADULT NURSE NOTE - CHIEF COMPLAINT QUOTE
Pt presents to er with [facility] with defibrillator firing x1 at 23:00 this evening, pt denies chest pain, sob, dizziness at this time, sitting up alert in stretcher, color appropriate for ethnicity, EKG strip given to .

## 2020-07-04 NOTE — H&P ADULT - NEUROLOGICAL DETAILS
cranial nerves intact/normal strength/responds to verbal commands/responds to pain/sensation intact/deep reflexes intact/alert and oriented x 3

## 2020-07-04 NOTE — ED ADULT NURSE NOTE - NEURO MENTATION

## 2020-07-04 NOTE — H&P ADULT - HISTORY OF PRESENT ILLNESS
pt presents to ED s/p being shocked by defibrillator while moving heavey object at home. follows with dr fox reed. at time of my evaluation denies fever. denies HA or neck pain. no chest pain or sob. no abd pain. no n/v/d. no urinary f/u/d. no back pain. no motor or sensory deficits. denies illicit drug use. . no rash. no other acute issues symptoms or concerns 87 yo Male presents to ED after being shocked by defibrillator while moving heavy object at home. This is his 3rd defibrillator over the last 10 years. He never had any shock before. He has no chest pain or shortness of breath. Patient denies any fever, denies Headache or neck pain. He also has no abd pain. He has no motor or sensory deficits. He denies illicit drug use. No other acute issues symptoms or concerns at this time.

## 2020-07-04 NOTE — CONSULT NOTE ADULT - SUBJECTIVE AND OBJECTIVE BOX
CHIEF COMPLAINT: Patient is a 86y old  Male who presents with a chief complaint of complain of defibrillator discharge (04 Jul 2020 04:47)      HPI:  85 yo Male presents to ED after being shocked by defibrillator while moving heavy object at home. This is his 3rd defibrillator over the last 10 years. He never had any shock before. He has no chest pain or shortness of breath. Patient denies any fever, denies Headache or neck pain. He also has no abd pain. He has no motor or sensory deficits. He denies illicit drug use. No other acute issues symptoms or concerns at this time. (04 Jul 2020 04:47)      PMHx: PAST MEDICAL & SURGICAL HISTORY:  Prostate cancer  Myocardial infarction: 54 years old  H/O ventricular tachycardia: PPM placed 2008  Hypertension  H/O prostate biopsy: cancer treated with prostate seeding  H/O pilonidal cyst  Elective surgery: right middle finger surgery, no hardware  History of tonsillectomy: as child  Implantable cardioverter-defibrillator (ICD) in situ: 2008        Soc Hx:       Allergies: Allergies    dexamethasone (Hives)  statins (Rash)    Intolerances          REVIEW OF SYSTEMS:    CONSTITUTIONAL: No weakness, fevers or chills  EYES/ENT: No visual changes;  No vertigo or throat pain   NECK: No pain or stiffness  RESPIRATORY: No cough, wheezing, hemoptysis; No shortness of breath  CARDIOVASCULAR: No chest pain or palpitations  GASTROINTESTINAL: No abdominal or epigastric pain. No nausea, vomiting, or hematemesis; No diarrhea or constipation. No melena or hematochezia.  GENITOURINARY: No dysuria, frequency or hematuria  NEUROLOGICAL: No numbness or weakness  SKIN: No itching, burning, rashes, or lesions   All other review of systems is negative unless indicated above    Vital Signs Last 24 Hrs  T(C): 36.4 (04 Jul 2020 04:00), Max: 36.9 (04 Jul 2020 00:00)  T(F): 97.5 (04 Jul 2020 04:00), Max: 98.4 (04 Jul 2020 00:00)  HR: 70 (04 Jul 2020 04:00) (70 - 85)  BP: 107/65 (04 Jul 2020 04:00) (94/63 - 123/66)  BP(mean): 76 (04 Jul 2020 04:00) (76 - 76)  RR: 18 (04 Jul 2020 04:00) (17 - 19)  SpO2: 97% (04 Jul 2020 04:00) (97% - 100%)    I&O's Summary          PHYSICAL EXAM:   Constitutional: NAD, awake and alert, well-developed  HEENT: PERR, EOMI, Normal Hearing, MMM  Neck: Soft and supple, No LAD, No JVD  Respiratory: Breath sounds are clear bilaterally, No wheezing, rales or rhonchi  Cardiovascular: S1 and S2, regular rate and rhythm, no Murmurs, gallops or rubs  Gastrointestinal: Bowel Sounds present, soft, nontender, nondistended, no guarding, no rebound  Extremities: No peripheral edema  Vascular: 2+ peripheral pulses  Neurological: A/O x 3, no focal deficits  Musculoskeletal: 5/5 strength b/l upper and lower extremities  Skin: No rashes    MEDICATIONS:  MEDICATIONS  (STANDING):  allopurinol 100 milliGRAM(s) Oral two times a day  aspirin 325 milliGRAM(s) Oral daily  carvedilol 6.25 milliGRAM(s) Oral every 12 hours  cholecalciferol 1000 Unit(s) Oral daily  lisinopril 2.5 milliGRAM(s) Oral daily      LABS: All Labs Reviewed:                        12.4   8.14  )-----------( 180      ( 03 Jul 2020 23:46 )             38.5     07-03    140  |  107  |  25<H>  ----------------------------<  96  4.1   |  29  |  0.99    Ca    8.7      03 Jul 2020 23:46  Mg     2.1     07-03      PT/INR - ( 03 Jul 2020 23:46 )   PT: 12.8 sec;   INR: 1.11 ratio         PTT - ( 03 Jul 2020 23:46 )  PTT:35.3 sec  CARDIAC MARKERS ( 04 Jul 2020 05:12 )  0.088 ng/mL / x     / x     / x     / x      CARDIAC MARKERS ( 03 Jul 2020 23:46 )  <0.015 ng/mL / x     / x     / x     / x          EKG: Paced    Telemetry:  Paced
Patient is a 86y old  Male who presents with a chief complaint of complain of defibrillator discharge (04 Jul 2020 09:30)      HPI:  Asked to se this 87 yo man who presented to ED after being shocked by his defibrillator while moving heavy object at home. This is his 3rd defibrillator over the last 10 years, and his second shock.    He has no chest pain or shortness of breath. Patient denies any fever, denies Headache or neck pain. He also has no abd pain. He has no motor or sensory deficits. He denies illicit drug use. No other acute issues symptoms or concerns at this time.       PAST MEDICAL & SURGICAL HISTORY:  Prostate cancer  Myocardial infarction: 54 years old  H/O ventricular tachycardia: PPM placed 2008  Hypertension  H/O prostate biopsy: cancer treated with prostate seeding  H/O pilonidal cyst  Elective surgery: right middle finger surgery, no hardware  History of tonsillectomy: as child  Implantable cardioverter-defibrillator (ICD) in situ: 2008        MEDICATIONS  (STANDING):  allopurinol 100 milliGRAM(s) Oral two times a day  aspirin 325 milliGRAM(s) Oral daily  carvedilol 6.25 milliGRAM(s) Oral every 12 hours  cholecalciferol 1000 Unit(s) Oral daily  lisinopril 2.5 milliGRAM(s) Oral daily    MEDICATIONS  (PRN):      FAMILY HISTORY:  FHx: myocardial infarction: father  FHx: breast cancer: mother      SOCIAL HISTORY:  No tobacco or ETOH    ROS:     A comprehensive review of systems was performed and pertinent items are noted in the history above.    Vital Signs Last 24 Hrs  T(C): 36.4 (04 Jul 2020 09:36), Max: 36.9 (04 Jul 2020 00:00)  T(F): 97.5 (04 Jul 2020 09:36), Max: 98.4 (04 Jul 2020 00:00)  HR: 71 (04 Jul 2020 09:36) (70 - 85)  BP: 127/74 (04 Jul 2020 09:36) (94/63 - 127/74)  BP(mean): 76 (04 Jul 2020 04:00) (76 - 76)  RR: 18 (04 Jul 2020 09:36) (17 - 19)  SpO2: 99% (04 Jul 2020 09:36) (97% - 100%)    I&O's Summary        INTERPRETATION OF TELEMETRY:    Sinus short run NSVT      ECG:    A paced V sensed with long MN    LABS:                          12.4   8.14  )-----------( 180      ( 03 Jul 2020 23:46 )             38.5     07-03    140  |  107  |  25<H>  ----------------------------<  96  4.1   |  29  |  0.99    Ca    8.7      03 Jul 2020 23:46  Mg     2.1     07-03      CARDIAC MARKERS ( 04 Jul 2020 05:12 )  0.088 ng/mL / x     / x     / x     / x      CARDIAC MARKERS ( 03 Jul 2020 23:46 )  <0.015 ng/mL / x     / x     / x     / x              PT/INR - ( 03 Jul 2020 23:46 )   PT: 12.8 sec;   INR: 1.11 ratio         PTT - ( 03 Jul 2020 23:46 )  PTT:35.3 sec      ICD interrogation reveals normal device and lead function.Normal battery voltage.  with one episode of VT. Cycle length 280 ms. Epsiode was treated with ATP prior to shock. Run of ATp transiently terminated arrhythmia, then it re-initiated and was terminated by a shock

## 2020-07-04 NOTE — DISCHARGE NOTE NURSING/CASE MANAGEMENT/SOCIAL WORK - PATIENT PORTAL LINK FT
You can access the FollowMyHealth Patient Portal offered by [facility] by registering at the following website: [email or web address]. By joining [employer]’s FollowMyHealth portal, you will also be able to view your health information using other applications (apps) compatible with our system.

## 2020-07-04 NOTE — CONSULT NOTE ADULT - ASSESSMENT
86 M with  CAD  and ischemic cardiomyopathy  with ICD shock       Patient denies chest pain, palpitations dizziness or any other symptoms     recommend ICD interrogation      discussed the possibility of having inpatient cardiac evaluation- echo  / stress  ETC . patient defers he stated that he doesnt want to stay in the hospital and will do a follow up in the office.     Minimal elevation in cardiac enzymes gregg due to shock-- would check 1 more set of enxymes along with CPK    He denies exertional symptoms at home.     He agreed to stay for interrogation, but for nothering else
This is an 86 year old man with a shock from his ICD for monomorphic appearing VT this is his second device therapy. since original implant > 10 years ago.   No other recent VT.   1) VT single isolated event. Recent negative stress test. He is currently on a good dose of beta blockers.   would continue beta blocker.   K and Mg acceptable.     2) Increased troponins likely secondary to shock.

## 2020-07-04 NOTE — DISCHARGE NOTE PROVIDER - NSDCMRMEDTOKEN_GEN_ALL_CORE_FT
allopurinol 100 mg oral tablet: 1 tab(s) orally 2 times a day  aspirin 325 mg oral tablet: 1 tab(s) orally once a day  carvedilol 6.25 mg oral tablet: 1 tab(s) orally 2 times a day  Fish Oil 1200 mg oral capsule: 1 cap(s) orally 3 times a day  lisinopril 2.5 mg oral tablet: 1 tab(s) orally once a day  Vitamin D3 1000 intl units oral tablet: 1 tab(s) orally once a day

## 2020-07-04 NOTE — ED ADULT NURSE NOTE - OBJECTIVE STATEMENT
pt presents to the ED s/p shock by AICD, per pt he was watching TV when he was suddenly shocked, pt denies chest pain, sob, AVILA, pt states he doesn't know why his pacemaker shocked him, pt states moving heavy furniture earlier in the day

## 2020-07-04 NOTE — H&P ADULT - NSICDXPASTSURGICALHX_GEN_ALL_CORE_FT
Wants a 90 day supply PAST SURGICAL HISTORY:  Elective surgery right middle finger surgery, no hardware    H/O pilonidal cyst     H/O prostate biopsy cancer treated with prostate seeding    History of tonsillectomy as child    Implantable cardioverter-defibrillator (ICD) in situ 2008

## 2020-07-04 NOTE — H&P ADULT - NSHPPHYSICALEXAM_GEN_ALL_CORE
Vital Signs Last 24 Hrs  T(C): 36.4 (04 Jul 2020 04:00), Max: 36.9 (04 Jul 2020 00:00)  T(F): 97.5 (04 Jul 2020 04:00), Max: 98.4 (04 Jul 2020 00:00)  HR: 70 (04 Jul 2020 04:00) (70 - 85)  BP: 107/65 (04 Jul 2020 04:00) (94/63 - 123/66)  BP(mean): 76 (04 Jul 2020 04:00) (76 - 76)  RR: 18 (04 Jul 2020 04:00) (17 - 19)  SpO2: 97% (04 Jul 2020 04:00) (97% - 100%)

## 2020-07-04 NOTE — H&P ADULT - ASSESSMENT
85 yo Male presented after defibrillator discharge.    A/P:    1.  Defibrillator Discharge  -will follow clinically in telemetry unit  -will follow troponin-as there is possibility of troponin leak due to the shock  -patient is asymptomatic now  -will follow cardiology consult- for possible Defibrillator check     2.  HTN  -stable  -continue home meds: Coreg and Lisinopril  -on aspirin    3.  SCD for DVT ppx    4.  Code status: Full code.

## 2020-07-04 NOTE — ED ADULT NURSE NOTE - CHPI ED NUR SYMPTOMS NEG
no diaphoresis/no congestion/no vomiting/no syncope/no dizziness/no nausea/no shortness of breath/no back pain/no chest pain/no chills/no fever

## 2020-07-04 NOTE — DISCHARGE NOTE PROVIDER - NSDCFUSCHEDAPPT_GEN_ALL_CORE_FT
RODO SYLVESTER ; 09/21/2020 ; [facility] [address] ; 09/28/2020 ; [facility] [address]

## 2020-09-21 ENCOUNTER — APPOINTMENT (OUTPATIENT)
Dept: ELECTROPHYSIOLOGY | Facility: CLINIC | Age: 85
End: 2020-09-21
Payer: MEDICARE

## 2020-09-21 VITALS
HEIGHT: 68 IN | OXYGEN SATURATION: 98 % | DIASTOLIC BLOOD PRESSURE: 61 MMHG | HEART RATE: 76 BPM | WEIGHT: 160 LBS | BODY MASS INDEX: 24.25 KG/M2 | RESPIRATION RATE: 16 BRPM | SYSTOLIC BLOOD PRESSURE: 95 MMHG

## 2020-09-21 PROCEDURE — 93290 INTERROG DEV EVAL ICPMS IP: CPT | Mod: 26

## 2020-09-21 PROCEDURE — 93283 PRGRMG EVAL IMPLANTABLE DFB: CPT

## 2020-09-21 RX ORDER — LISINOPRIL 2.5 MG/1
2.5 TABLET ORAL DAILY
Refills: 0 | Status: DISCONTINUED | COMMUNITY
End: 2020-09-21

## 2020-09-21 RX ORDER — ALLOPURINOL 100 MG/1
100 TABLET ORAL DAILY
Refills: 0 | Status: DISCONTINUED | COMMUNITY
End: 2020-09-21

## 2020-09-21 RX ORDER — ALLOPURINOL 100 MG/1
100 TABLET ORAL TWICE DAILY
Refills: 0 | Status: DISCONTINUED | COMMUNITY
End: 2020-09-21

## 2020-09-21 RX ORDER — CARVEDILOL 6.25 MG/1
6.25 TABLET, FILM COATED ORAL TWICE DAILY
Refills: 0 | Status: DISCONTINUED | COMMUNITY
End: 2020-09-21

## 2020-12-17 ENCOUNTER — INPATIENT (INPATIENT)
Facility: HOSPITAL | Age: 85
LOS: 1 days | Discharge: HOME | DRG: 227 | End: 2020-12-19
Attending: FAMILY MEDICINE | Admitting: HOSPITALIST
Payer: MEDICARE

## 2020-12-17 VITALS
OXYGEN SATURATION: 97 % | HEART RATE: 85 BPM | SYSTOLIC BLOOD PRESSURE: 130 MMHG | RESPIRATION RATE: 15 BRPM | TEMPERATURE: 98 F | DIASTOLIC BLOOD PRESSURE: 76 MMHG | WEIGHT: 160.06 LBS | HEIGHT: 65 IN

## 2020-12-17 DIAGNOSIS — Z41.9 ENCOUNTER FOR PROCEDURE FOR PURPOSES OTHER THAN REMEDYING HEALTH STATE, UNSPECIFIED: Chronic | ICD-10-CM

## 2020-12-17 DIAGNOSIS — Z98.890 OTHER SPECIFIED POSTPROCEDURAL STATES: Chronic | ICD-10-CM

## 2020-12-17 DIAGNOSIS — T82.110A BREAKDOWN (MECHANICAL) OF CARDIAC ELECTRODE, INITIAL ENCOUNTER: ICD-10-CM

## 2020-12-17 DIAGNOSIS — Z87.2 PERSONAL HISTORY OF DISEASES OF THE SKIN AND SUBCUTANEOUS TISSUE: Chronic | ICD-10-CM

## 2020-12-17 DIAGNOSIS — Z95.810 PRESENCE OF AUTOMATIC (IMPLANTABLE) CARDIAC DEFIBRILLATOR: Chronic | ICD-10-CM

## 2020-12-17 DIAGNOSIS — Z90.89 ACQUIRED ABSENCE OF OTHER ORGANS: Chronic | ICD-10-CM

## 2020-12-17 LAB
ALBUMIN SERPL ELPH-MCNC: 3.1 G/DL — LOW (ref 3.3–5)
ALP SERPL-CCNC: 134 U/L — HIGH (ref 40–120)
ALT FLD-CCNC: 20 U/L — SIGNIFICANT CHANGE UP (ref 12–78)
ANION GAP SERPL CALC-SCNC: 1 MMOL/L — LOW (ref 5–17)
APTT BLD: 35.8 SEC — HIGH (ref 27.5–35.5)
AST SERPL-CCNC: 20 U/L — SIGNIFICANT CHANGE UP (ref 15–37)
BASOPHILS # BLD AUTO: 0.06 K/UL — SIGNIFICANT CHANGE UP (ref 0–0.2)
BASOPHILS NFR BLD AUTO: 1 % — SIGNIFICANT CHANGE UP (ref 0–2)
BILIRUB SERPL-MCNC: 1.1 MG/DL — SIGNIFICANT CHANGE UP (ref 0.2–1.2)
BUN SERPL-MCNC: 18 MG/DL — SIGNIFICANT CHANGE UP (ref 7–23)
CALCIUM SERPL-MCNC: 8.9 MG/DL — SIGNIFICANT CHANGE UP (ref 8.5–10.1)
CHLORIDE SERPL-SCNC: 108 MMOL/L — SIGNIFICANT CHANGE UP (ref 96–108)
CO2 SERPL-SCNC: 31 MMOL/L — SIGNIFICANT CHANGE UP (ref 22–31)
CREAT SERPL-MCNC: 0.92 MG/DL — SIGNIFICANT CHANGE UP (ref 0.5–1.3)
EOSINOPHIL # BLD AUTO: 0.41 K/UL — SIGNIFICANT CHANGE UP (ref 0–0.5)
EOSINOPHIL NFR BLD AUTO: 6.7 % — HIGH (ref 0–6)
GLUCOSE SERPL-MCNC: 98 MG/DL — SIGNIFICANT CHANGE UP (ref 70–99)
HCT VFR BLD CALC: 38.7 % — LOW (ref 39–50)
HGB BLD-MCNC: 12.4 G/DL — LOW (ref 13–17)
IMM GRANULOCYTES NFR BLD AUTO: 0.3 % — SIGNIFICANT CHANGE UP (ref 0–1.5)
INR BLD: 1.12 RATIO — SIGNIFICANT CHANGE UP (ref 0.88–1.16)
LYMPHOCYTES # BLD AUTO: 1.07 K/UL — SIGNIFICANT CHANGE UP (ref 1–3.3)
LYMPHOCYTES # BLD AUTO: 17.5 % — SIGNIFICANT CHANGE UP (ref 13–44)
MCHC RBC-ENTMCNC: 30.1 PG — SIGNIFICANT CHANGE UP (ref 27–34)
MCHC RBC-ENTMCNC: 32 GM/DL — SIGNIFICANT CHANGE UP (ref 32–36)
MCV RBC AUTO: 93.9 FL — SIGNIFICANT CHANGE UP (ref 80–100)
MONOCYTES # BLD AUTO: 0.67 K/UL — SIGNIFICANT CHANGE UP (ref 0–0.9)
MONOCYTES NFR BLD AUTO: 10.9 % — SIGNIFICANT CHANGE UP (ref 2–14)
NEUTROPHILS # BLD AUTO: 3.9 K/UL — SIGNIFICANT CHANGE UP (ref 1.8–7.4)
NEUTROPHILS NFR BLD AUTO: 63.6 % — SIGNIFICANT CHANGE UP (ref 43–77)
PLATELET # BLD AUTO: 178 K/UL — SIGNIFICANT CHANGE UP (ref 150–400)
POTASSIUM SERPL-MCNC: 4 MMOL/L — SIGNIFICANT CHANGE UP (ref 3.5–5.3)
POTASSIUM SERPL-SCNC: 4 MMOL/L — SIGNIFICANT CHANGE UP (ref 3.5–5.3)
PROT SERPL-MCNC: 6.8 GM/DL — SIGNIFICANT CHANGE UP (ref 6–8.3)
PROTHROM AB SERPL-ACNC: 12.9 SEC — SIGNIFICANT CHANGE UP (ref 10.6–13.6)
RBC # BLD: 4.12 M/UL — LOW (ref 4.2–5.8)
RBC # FLD: 14.1 % — SIGNIFICANT CHANGE UP (ref 10.3–14.5)
SARS-COV-2 RNA SPEC QL NAA+PROBE: SIGNIFICANT CHANGE UP
SODIUM SERPL-SCNC: 140 MMOL/L — SIGNIFICANT CHANGE UP (ref 135–145)
TROPONIN I SERPL-MCNC: <0.015 NG/ML — SIGNIFICANT CHANGE UP (ref 0.01–0.04)
WBC # BLD: 6.13 K/UL — SIGNIFICANT CHANGE UP (ref 3.8–10.5)
WBC # FLD AUTO: 6.13 K/UL — SIGNIFICANT CHANGE UP (ref 3.8–10.5)

## 2020-12-17 PROCEDURE — 80048 BASIC METABOLIC PNL TOTAL CA: CPT

## 2020-12-17 PROCEDURE — 85027 COMPLETE CBC AUTOMATED: CPT

## 2020-12-17 PROCEDURE — C1721: CPT

## 2020-12-17 PROCEDURE — 99223 1ST HOSP IP/OBS HIGH 75: CPT

## 2020-12-17 PROCEDURE — 93010 ELECTROCARDIOGRAM REPORT: CPT

## 2020-12-17 PROCEDURE — C1769: CPT

## 2020-12-17 PROCEDURE — 36415 COLL VENOUS BLD VENIPUNCTURE: CPT

## 2020-12-17 PROCEDURE — C1894: CPT

## 2020-12-17 PROCEDURE — C1777: CPT

## 2020-12-17 PROCEDURE — 33216 INSERT 1 ELECTRODE PM-DEFIB: CPT

## 2020-12-17 PROCEDURE — 71045 X-RAY EXAM CHEST 1 VIEW: CPT

## 2020-12-17 PROCEDURE — 71045 X-RAY EXAM CHEST 1 VIEW: CPT | Mod: 26

## 2020-12-17 PROCEDURE — 93005 ELECTROCARDIOGRAM TRACING: CPT

## 2020-12-17 PROCEDURE — C1889: CPT

## 2020-12-17 PROCEDURE — 93306 TTE W/DOPPLER COMPLETE: CPT | Mod: 26

## 2020-12-17 PROCEDURE — 99222 1ST HOSP IP/OBS MODERATE 55: CPT

## 2020-12-17 RX ORDER — ASPIRIN/CALCIUM CARB/MAGNESIUM 324 MG
325 TABLET ORAL DAILY
Refills: 0 | Status: DISCONTINUED | OUTPATIENT
Start: 2020-12-17 | End: 2020-12-19

## 2020-12-17 RX ORDER — ALLOPURINOL 300 MG
100 TABLET ORAL
Refills: 0 | Status: DISCONTINUED | OUTPATIENT
Start: 2020-12-17 | End: 2020-12-19

## 2020-12-17 RX ORDER — LISINOPRIL 2.5 MG/1
1 TABLET ORAL
Qty: 0 | Refills: 0 | DISCHARGE

## 2020-12-17 RX ORDER — CARVEDILOL PHOSPHATE 80 MG/1
6.25 CAPSULE, EXTENDED RELEASE ORAL EVERY 12 HOURS
Refills: 0 | Status: DISCONTINUED | OUTPATIENT
Start: 2020-12-17 | End: 2020-12-19

## 2020-12-17 RX ADMIN — Medication 325 MILLIGRAM(S): at 21:41

## 2020-12-17 RX ADMIN — CARVEDILOL PHOSPHATE 6.25 MILLIGRAM(S): 80 CAPSULE, EXTENDED RELEASE ORAL at 21:41

## 2020-12-17 RX ADMIN — Medication 100 MILLIGRAM(S): at 21:41

## 2020-12-17 NOTE — CHART NOTE - NSCHARTNOTEFT_GEN_A_CORE
Patient with MDT fractured ICD lead.  After discussed with patient, Dr. Maurisio Amado plan is for patient to have new RV lead placed tomorrow.  NPO after MN

## 2020-12-17 NOTE — CONSULT NOTE ADULT - ASSESSMENT
86 y/o male with Medtonic ICD that was implanted in  88 y/o male with Medtronic ICD that was implanted in for primary prevention of ischemic CM and had an appropriate shock for VT in July.  His interrogation shows  a fracture of SVC coil on ICD with lead impedances > than 200 ohms.  He had a STAT ECHO which shows moderate MR and EF.  Pt was brought into the hospital via an ambulance (as he was unable  advised not to drive) .  He is followed with Dr. Almanzar who was made aware of the below plan.     Plan:  As per Dr. Forde patient needs new high voltage lead implanted.  Options such as   lead extraction  at a tertiary facility have been discussed along  with risks (death, CVA, MI, cardiac tamponade Pneumothorax)or possible abandoning  this RV lead and placing new RV lead.  86 y/o male with Medtronic ICD that was implanted in for primary prevention of ischemic CM (MI in remote past) and had an appropriate shock for VT in July 2020.  His interrogation shows  a fracture of SVC/RVC coils on ICD with lead impedances > than 200 ohms and noise in tip to coil sensing egm. He had a STAT ECHO which shows moderate MR and EF stable 20-30%. Pt was called to come to hospital via an ambulance (as he was unable  advised not to drive) .  He is followed with Dr. Almanzar who was made aware of the below plan.     Plan:  As per Dr. Forde patient needs new high voltage lead implanted.  Options such as   lead extraction  at a tertiary facility have been discussed along  with risks ( major compliacation risk ~ 2% including death, CVA, MI, cardiac tamponade Pneumothorax)or possible abandoning  this RV lead and placing new ICD lead. Both treatment options were discussed including i/r/b/a and all questions were answered. Pt prefers to undergo new ICD lead insertion and capping current ICD lead.   Case discussed with Dr Almanzar and Dr Jackson   active t&s, NPO

## 2020-12-17 NOTE — H&P ADULT - ASSESSMENT
ICD malfunction  - EP following  - Plan for Lead placement tomorrow  - NPO past MN    HTN / CAD  - Continue Coreg   - Continue ASA     Gout   - Continue Allopurinol     DVT PPX    Signed out to: Dr Castelan

## 2020-12-17 NOTE — ED ADULT NURSE NOTE - CAS EDP DISCH DISPOSITION ADMI
DISCHARGE PLANNING     Discharge to home or other facility with appropriate resources Progressing        DISCHARGE PLANNING - CARE MANAGEMENT     Discharge to post-acute care or home with appropriate resources Progressing        HEMATOLOGIC - ADULT     Maintains hematologic stability Progressing        Knowledge Deficit     Patient/family/caregiver demonstrates understanding of disease process, treatment plan, medications, and discharge instructions Progressing        Potential for Falls     Patient will remain free of falls Progressing        SAFETY ADULT     Maintain or return to baseline ADL function Progressing     Maintain or return mobility status to optimal level Progressing nika rn/CCU

## 2020-12-17 NOTE — ED ADULT NURSE NOTE - OBJECTIVE STATEMENT
Pt presents to [facility] with complaints of hearing a beeping noise from his pacemaker, pt states his cardiologist told him to come to the er for interrogation and repair of pacemaker, this is pt's 3rd pacemaker, pt denies chest pain, sob, headaches, dizziness at this time, paced rate 71 on cm, respirations unlabored, pt observed sitting upright in stretcher in no distress at this time, color appropriate for ethnicity, lungs clear bilaterally, abd soft-non-tender, safety maintained, call bell at side, stretcher in lowest position, pt educated on use of call bell and verbalized to myself understanding of all instructions and plan of care at this time. Pt presents to [facility] with complaints of hearing a beeping noise from his pacemaker, pt states his cardiologist told him to come to the er for interrogation and repair of pacemaker, this is pt's 3rd pacemaker, pt denies chest pain, sob, headaches, dizziness at this time, atrial paced rate 71 on cm, respirations unlabored, pt observed sitting upright in stretcher in no distress at this time, color appropriate for ethnicity, lungs clear bilaterally, abd soft-non-tender, safety maintained, call bell at side, stretcher in lowest position, pt educated on use of call bell and verbalized to myself understanding of all instructions and plan of care at this time.

## 2020-12-17 NOTE — ED PROVIDER NOTE - OBJECTIVE STATEMENT
86 y/o male with a PMHx of HTN, MI, Prostate CA, PPM placed in 2008, presents to the ED c/o pacemaker/ AICD was beeping. States this is his 3rd pacemaker, which was placed for irregular heart beat. States approx 12 hours ago noticed a noise from pacemaker. EP contacted wife to send pt to the ED. Pt denies pain, headache, chest pain, n/v/d. Non smoker. No illegal drugs. No alcohol use.   PMD[facility] Almanzar[facility]   EP: Dr. Jackson

## 2020-12-17 NOTE — ED PROVIDER NOTE - PROGRESS NOTE DETAILS
Kely Eugene: EP at bedside interrogating pacemaker. Discussed with Dr. Forde.  [facility]  Will keep NPO overnight.  Called hospitalist phone, no answer, will call again. Shravan Zapata D.O.

## 2020-12-17 NOTE — H&P ADULT - NSHPREVIEWOFSYSTEMS_GEN_ALL_CORE
CONSTITUTIONAL:  No Fever or chills  HEENT:  No diplopia or blurred vision.  No earache, sore throat or runny nose.  CARDIOVASCULAR:  No pressure, squeezing, strangling, tightness, heaviness or aching about the chest, neck, axilla or epigastrium.  RESPIRATORY:  No cough, shortness of breath  GASTROINTESTINAL:  No nausea, vomiting or diarrhea.  GENITOURINARY:  No dysuria, frequency or urgency.   MUSCULOSKELETAL:  no joint aches, no muscle pain  SKIN:  No change in skin, hair or nails.  NEUROLOGIC:  No Headaches, seizures   PSYCHIATRIC:  No disorder of thought or mood.  ENDOCRINE:  No heat or cold intolerance  HEMATOLOGICAL:  No easy bruising or bleeding.

## 2020-12-17 NOTE — ED ADULT NURSE REASSESSMENT NOTE - NS ED NURSE REASSESS COMMENT FT1
Spoke with , awaiting EP to get back as to plan of care, maybe possible transfer. Awaiting further update from cardiology at this time, pt's safety maintained, paced rhythm on cm ectopy pvc's, denies chest pain, sob, dizziness, will continue to monitor.

## 2020-12-17 NOTE — H&P ADULT - HISTORY OF PRESENT ILLNESS
87 y.o M with h/o ischemic CM is s/p ICD for initially primary prevention in 2008, prostate ca, HTN. Patient presents to the ED with c/o ICD alerts which started last night and continued to this morning. Patient called his cardiologist and transferred his ICD readings over to the office and was told to come into the ER since they noticed  SVC lead fracture. In the ER patient was seen by EP and is scheduled for new lead placement.

## 2020-12-17 NOTE — ED ADULT NURSE REASSESSMENT NOTE - NS ED NURSE REASSESS COMMENT FT1
at bedside, pt updated on plan of care, pt wishes more conservative treatment at this time, paced on cm rate of 76, will continue to monitor.

## 2020-12-17 NOTE — ED PROVIDER NOTE - CARDIAC, MLM
Normal rate, regular rhythm.  Heart sounds S1, S2.  No murmurs, rubs or gallops. +pacemaker to L upper chest

## 2020-12-17 NOTE — H&P ADULT - NSHPLABSRESULTS_GEN_ALL_CORE
T(F): 98.3 (17 Dec 2020 11:11), Max: 98.3 (17 Dec 2020 11:11)  HR: 73 (17 Dec 2020 17:18)  BP: 106/63 (17 Dec 2020 17:18)  RR: 15 (17 Dec 2020 11:11)  SpO2: 96% (17 Dec 2020 17:18) (96% - 97%)  temp max in last 48H T(F): , Max: 98.3 (12-17-20 @ 11:11)      Labs:                        12.4   6.13  )-----------( 178      ( 17 Dec 2020 11:23 )             38.7      12-17    140  |  108  |  18  ----------------------------<  98  4.0   |  31  |  0.92    Ca    8.9      17 Dec 2020 11:23    TPro  6.8  /  Alb  3.1<L>  /  TBili  1.1  /  DBili  x   /  AST  20  /  ALT  20  /  AlkPhos  134<H>  12-17    WBC Count: 6.13 K/uL (12-17-20 @ 11:23)    Creatinine, Serum: 0.92 mg/dL (12-17-20 @ 11:23)      COVID-19 PCR: NotDetec (12-17-20 @ 11:23)

## 2020-12-18 LAB
SARS-COV-2 IGG SERPL QL IA: NEGATIVE — SIGNIFICANT CHANGE UP
SARS-COV-2 IGM SERPL IA-ACNC: <0.2 RATIO — SIGNIFICANT CHANGE UP

## 2020-12-18 PROCEDURE — 33241 REMOVE PULSE GENERATOR: CPT

## 2020-12-18 PROCEDURE — 71045 X-RAY EXAM CHEST 1 VIEW: CPT | Mod: 26

## 2020-12-18 PROCEDURE — 93010 ELECTROCARDIOGRAM REPORT: CPT

## 2020-12-18 PROCEDURE — 99232 SBSQ HOSP IP/OBS MODERATE 35: CPT

## 2020-12-18 PROCEDURE — 33249 INSJ/RPLCMT DEFIB W/LEAD(S): CPT

## 2020-12-18 RX ORDER — CEFAZOLIN SODIUM 1 G
2000 VIAL (EA) INJECTION ONCE
Refills: 0 | Status: COMPLETED | OUTPATIENT
Start: 2020-12-18 | End: 2020-12-18

## 2020-12-18 RX ORDER — CEFAZOLIN SODIUM 1 G
1000 VIAL (EA) INJECTION EVERY 8 HOURS
Refills: 0 | Status: DISCONTINUED | OUTPATIENT
Start: 2020-12-18 | End: 2020-12-18

## 2020-12-18 RX ORDER — CEFAZOLIN SODIUM 1 G
1000 VIAL (EA) INJECTION EVERY 8 HOURS
Refills: 0 | Status: COMPLETED | OUTPATIENT
Start: 2020-12-18 | End: 2020-12-19

## 2020-12-18 RX ADMIN — CARVEDILOL PHOSPHATE 6.25 MILLIGRAM(S): 80 CAPSULE, EXTENDED RELEASE ORAL at 15:07

## 2020-12-18 RX ADMIN — CARVEDILOL PHOSPHATE 6.25 MILLIGRAM(S): 80 CAPSULE, EXTENDED RELEASE ORAL at 21:17

## 2020-12-18 RX ADMIN — Medication 100 MILLIGRAM(S): at 21:17

## 2020-12-18 RX ADMIN — Medication 100 MILLIGRAM(S): at 10:02

## 2020-12-18 RX ADMIN — Medication 1000 MILLIGRAM(S): at 21:37

## 2020-12-18 RX ADMIN — Medication 100 MILLIGRAM(S): at 15:07

## 2020-12-18 RX ADMIN — Medication 325 MILLIGRAM(S): at 15:07

## 2020-12-18 NOTE — PACU DISCHARGE NOTE - COMMENTS
Patient tolerated procedure. VSS No c/o Laert & oriwented Report to [facility]. Telemetry Monitor varified with unit.  Voiding qs. ICD paired with Rep. Amilcar Box sent with patient. CM is paced rhythm. Returned to via stretcher with transport.

## 2020-12-18 NOTE — CONSULT NOTE ADULT - SUBJECTIVE AND OBJECTIVE BOX
87y Male with ischemic CM is s/p ICD for initially primary prevention in 2008, last ICD gen change was 2019.  He called the office today stating he  heard multiple alerts coming from his ICD.  He had no symptoms and was asked to send a transmission which documented SVC lead fracture     PAST MEDICAL & SURGICAL HISTORY:  Prostate cancer  Myocardial [id number] years old  H/O ventricular tachycardia  ICD placed 2008  Hypertension  H/O prostate biopsy  cancer treated with prostate seeding  H/O pilonidal cyst  Elective surgeryright middle finger surgery, no hardware  History of tonsillectomy  as child          MEDICATIONS  (STANDING):    MEDICATIONS  (PRN):      Allergies    dexamethasone (Hives)  statins (Rash)    Intolerances        SOCIAL HISTORY: Denies tobacco, etoh abuse or illicit drug use    FAMILY HISTORY:  FHx: myocardial infarction  father    FHx: breast cancer  mother        Vital Signs Last 24 Hrs  T(C): 36.8 (17 Dec 2020 11:11), Max: 36.8 (17 Dec 2020 11:11)  T(F): 98.3 (17 Dec 2020 11:11), Max: 98.3 (17 Dec 2020 11:11)  HR: 70 (17 Dec 2020 12:54) (70 - 85)  BP: 103/60 (17 Dec 2020 12:17) (103/60 - 130/76)  BP(mean): 71 (17 Dec 2020 12:17) (71 - 71)  RR: 15 (17 Dec 2020 11:11) (15 - 15)  SpO2: 96% (17 Dec 2020 12:17) (96% - 97%)    REVIEW OF SYSTEMS:    CONSTITUTIONAL:  As per HPI.  HEENT:  Eyes:  No diplopia or blurred vision. ENT:  No earache, sore throat or runny nose.  CARDIOVASCULAR:  No pressure, squeezing, strangling, tightness, heaviness or aching about the chest, neck, axilla or epigastrium.  RESPIRATORY:  No cough, shortness of breath, PND or orthopnea.  GASTROINTESTINAL:  No nausea, vomiting or diarrhea.  GENITOURINARY:  No dysuria, frequency or urgency.  MUSCULOSKELETAL:  As per HPI.  SKIN:  No change in skin, hair or nails.  NEUROLOGIC:  No paresthesias, fasciculations, seizures or weakness.  PSYCHIATRIC:  No disorder of thought or mood.  ENDOCRINE:  No heat or cold intolerance, polyuria or polydipsia.  HEMATOLOGICAL:  No easy bruising or bleedings:  .     PHYSICAL EXAMINATION:    GENERAL APPEARANCE:  Pt. is not currently dyspneic, in no distress. Pt. is alert, oriented, and pleasant.  HEENT:  Pupils are normal and react normally. No icterus. Mucous membranes well colored.  NECK:  Supple. No lymphadenopathy. Jugular venous pressure not elevated. Carotids equal.   HEART:   The cardiac impulse has a normal quality. There are no murmurs, rubs or gallops noted  CHEST:  Chest is clear to auscultation. Normal respiratory effort.  ABDOMEN:  Soft and nontender.   EXTREMITIES:  There is no edema.   SKIN:  No rash or significant lesions are noted.    I&O's Summary      LABS:                        12.4   6.13  )-----------( 178      ( 17 Dec 2020 11:23 )             38.7     12-17    140  |  108  |  18  ----------------------------<  98  4.0   |  31  |  0.92    Ca    8.9      17 Dec 2020 11:23    TPro  6.8  /  Alb  3.1<L>  /  TBili  1.1  /  DBili  x   /  AST  20  /  ALT  20  /  AlkPhos  134<H>  12-17    LIVER FUNCTIONS - ( 17 Dec 2020 11:23 )  Alb: 3.1 g/dL / Pro: 6.8 gm/dL / ALK PHOS: 134 U/L / ALT: 20 U/L / AST: 20 U/L / GGT: x           PT/INR - ( 17 Dec 2020 11:23 )   PT: 12.9 sec;   INR: 1.12 ratio         PTT - ( 17 Dec 2020 11:23 )  PTT:35.8 sec  CARDIAC MARKERS ( 17 Dec 2020 11:23 )  <0.015 ng/mL / x     / x     / x     / x                EKG:    TELEMETRY:  SR     CARDIAC TESTS:    RADIOLOGY & ADDITIONAL STUDIES:     
  CHIEF COMPLAINT: Patient is a 87y old  Male who presents with a chief complaint of Malfunctioning ICD (17 Dec 2020 17:21)      HPI:  87 y.o M with h/o ischemic CM is s/p ICD for initially primary prevention in 2008, prostate ca, HTN. Patient presents to the ED with c/o ICD alerts which started last night and continued to this morning. Patient called his cardiologist and transferred his ICD readings over to the office and was told to come into the ER since they noticed  SVC lead fracture. In the ER patient was seen by EP and is scheduled for new lead placement.  (17 Dec 2020 17:21)      PMHx: PAST MEDICAL & SURGICAL HISTORY:  Prostate cancer    Myocardial infarction  54 years old    H/O ventricular tachycardia  PPM placed 2008    Hypertension    H/O prostate biopsy  cancer treated with prostate seeding    H/O pilonidal cyst    Elective surgery  right middle finger surgery, no hardware    History of tonsillectomy  as child    Implantable cardioverter-defibrillator (ICD) in situ  2008          Soc Hx:       Allergies: Allergies    dexamethasone (Hives)  statins (Rash)    Intolerances          REVIEW OF SYSTEMS:    CONSTITUTIONAL: No weakness, fevers or chills  EYES/ENT: No visual changes;  No vertigo or throat pain   NECK: No pain or stiffness  RESPIRATORY: No cough, wheezing, hemoptysis; No shortness of breath  CARDIOVASCULAR: No chest pain or palpitations  GASTROINTESTINAL: No abdominal or epigastric pain. No nausea, vomiting, or hematemesis; No diarrhea or constipation. No melena or hematochezia.  GENITOURINARY: No dysuria, frequency or hematuria  NEUROLOGICAL: No numbness or weakness  SKIN: No itching, burning, rashes, or lesions   All other review of systems is negative unless indicated above    Vital Signs Last 24 Hrs  T(C): 35 (18 Dec 2020 06:31), Max: 36.8 (17 Dec 2020 11:11)  T(F): 95 (18 Dec 2020 06:31), Max: 98.3 (17 Dec 2020 11:11)  HR: 70 (18 Dec 2020 06:00) (70 - 85)  BP: 113/64 (18 Dec 2020 06:00) (102/60 - 130/76)  BP(mean): 76 (18 Dec 2020 06:00) (68 - 84)  RR: 10 (18 Dec 2020 06:00) (10 - 23)  SpO2: 96% (18 Dec 2020 05:00) (85% - 99%)    I&O's Summary          PHYSICAL EXAM:   Constitutional: NAD, awake and alert, well-developed  HEENT: PERR, EOMI, Normal Hearing, MMM  Neck: Soft and supple, No LAD, No JVD  Respiratory: Breath sounds are clear bilaterally, No wheezing, rales or rhonchi  Cardiovascular: S1 and S2, regular rate and rhythm, no Murmurs, gallops or rubs  Gastrointestinal: Bowel Sounds present, soft, nontender, nondistended, no guarding, no rebound  Extremities: No peripheral edema  Vascular: 2+ peripheral pulses  Neurological: A/O x 3, no focal deficits  Musculoskeletal: 5/5 strength b/l upper and lower extremities  Skin: No rashes    MEDICATIONS:  MEDICATIONS  (STANDING):  allopurinol 100 milliGRAM(s) Oral two times a day  aspirin 325 milliGRAM(s) Oral daily  carvedilol 6.25 milliGRAM(s) Oral every 12 hours      LABS: All Labs Reviewed:                        12.4   6.13  )-----------( 178      ( 17 Dec 2020 11:23 )             38.7     12-17    140  |  108  |  18  ----------------------------<  98  4.0   |  31  |  0.92    Ca    8.9      17 Dec 2020 11:23    TPro  6.8  /  Alb  3.1<L>  /  TBili  1.1  /  DBili  x   /  AST  20  /  ALT  20  /  AlkPhos  134<H>  12-17    PT/INR - ( 17 Dec 2020 11:23 )   PT: 12.9 sec;   INR: 1.12 ratio         PTT - ( 17 Dec 2020 11:23 )  PTT:35.8 sec  CARDIAC MARKERS ( 17 Dec 2020 11:23 )  <0.015 ng/mL / x     / x     / x     / x              EKG: A paced- LBBB , LAFB    Telemetry: Paced    ECHO: pending    
[facility] [phone]  Rolling walker ,  raised toilet seat commode  Pt will look for a  hip cushion at a local surgical supply store

## 2020-12-18 NOTE — PROGRESS NOTE ADULT - SUBJECTIVE AND OBJECTIVE BOX
87 y.o M with h/o ischemic CM is s/p ICD for initially primary prevention in 2008, prostate ca, HTN. Patient presents to the ED with c/o ICD alerts which started last night and continued to this morning. Patient called his cardiologist and transferred his ICD readings over to the office and was told to come into the ER since they noticed  SVC lead fracture. In the ER patient was seen by EP and is scheduled for new lead placement.  (17 Dec 2020 17:21)    Vital Signs Last 24 Hrs  T(C): 35 (18 Dec 2020 06:31), Max: 36.8 (17 Dec 2020 11:11)  T(F): 95 (18 Dec 2020 06:31), Max: 98.3 (17 Dec 2020 11:11)  HR: 70 (18 Dec 2020 06:00) (70 - 85)  BP: 113/64 (18 Dec 2020 06:00) (102/60 - 130/76)  BP(mean): 76 (18 Dec 2020 06:00) (68 - 84)  RR: 10 (18 Dec 2020 06:00) (10 - 23)  SpO2: 96% (18 Dec 2020 05:00) (85% - 99%)    I&O's Summary          PHYSICAL EXAM:   Constitutional: NAD, awake and alert, well-developed  HEENT: PERR, EOMI, Normal Hearing, MMM  Neck: Soft and supple, No LAD, No JVD  Respiratory: Breath sounds are clear bilaterally, No wheezing, rales or rhonchi  Cardiovascular: S1 and S2, regular rate and rhythm, no Murmurs, gallops or rubs  Gastrointestinal: Bowel Sounds present, soft, nontender, nondistended, no guarding, no rebound  Extremities: No peripheral edema  Vascular: 2+ peripheral pulses  Neurological: A/O x 3, no focal deficits  Musculoskeletal: 5/5 strength b/l upper and lower extremities  Skin: No rashes    MEDICATIONS:  MEDICATIONS  (STANDING):  allopurinol 100 milliGRAM(s) Oral two times a day  aspirin 325 milliGRAM(s) Oral daily  carvedilol 6.25 milliGRAM(s) Oral every 12 hours      LABS: All Labs Reviewed:                        12.4   6.13  )-----------( 178      ( 17 Dec 2020 11:23 )             38.7     12-17    140  |  108  |  18  ----------------------------<  98  4.0   |  31  |  0.92    Ca    8.9      17 Dec 2020 11:23    TPro  6.8  /  Alb  3.1<L>  /  TBili  1.1  /  DBili  x   /  AST  20  /  ALT  20  /  AlkPhos  134<H>  12-17    PT/INR - ( 17 Dec 2020 11:23 )   PT: 12.9 sec;   INR: 1.12 ratio        EKG: A paced- LBBB , LAFB    Telemetry: Paced    ECHO: pending        87 M with ischemic   CM with ICD with , SVC lead fracture  HTN / CAD    _ EP eval- revision  - Continue Coreg   - Continue ASA          87 y.o M with h/o ischemic CM is s/p ICD for initially primary prevention in 2008, prostate ca, HTN. Patient presents to the ED with c/o ICD alerts which started last night and continued to this morning. Patient called his cardiologist and transferred his ICD readings over to the office and was told to come into the ER since they noticed  SVC lead fracture. In the ER patient was seen by EP and is scheduled for new lead placement.  (17 Dec 2020 17:21)    Vital Signs Last 24 Hrs  T(C): 35 (18 Dec 2020 06:31), Max: 36.8 (17 Dec 2020 11:11)  T(F): 95 (18 Dec 2020 06:31), Max: 98.3 (17 Dec 2020 11:11)  HR: 70 (18 Dec 2020 06:00) (70 - 85)  BP: 113/64 (18 Dec 2020 06:00) (102/60 - 130/76)  BP(mean): 76 (18 Dec 2020 06:00) (68 - 84)  RR: 10 (18 Dec 2020 06:00) (10 - 23)  SpO2: 96% (18 Dec 2020 05:00) (85% - 99%)    I&O's Summary          PHYSICAL EXAM:   Constitutional: NAD, awake and alert, well-developed  HEENT: PERR, EOMI, Normal Hearing, MMM  Neck: Soft and supple, No LAD, No JVD  Respiratory: Breath sounds are clear bilaterally, No wheezing, rales or rhonchi  Cardiovascular: S1 and S2, regular rate and rhythm, no Murmurs, gallops or rubs  Gastrointestinal: Bowel Sounds present, soft, nontender, nondistended, no guarding, no rebound  Extremities: No peripheral edema  Vascular: 2+ peripheral pulses  Neurological: A/O x 3, no focal deficits  Musculoskeletal: 5/5 strength b/l upper and lower extremities  Skin: No rashes    MEDICATIONS:  MEDICATIONS  (STANDING):  allopurinol 100 milliGRAM(s) Oral two times a day  aspirin 325 milliGRAM(s) Oral daily  carvedilol 6.25 milliGRAM(s) Oral every 12 hours      LABS: All Labs Reviewed:                        12.4   6.13  )-----------( 178      ( 17 Dec 2020 11:23 )             38.7     12-17    140  |  108  |  18  ----------------------------<  98  4.0   |  31  |  0.92    Ca    8.9      17 Dec 2020 11:23    TPro  6.8  /  Alb  3.1<L>  /  TBili  1.1  /  DBili  x   /  AST  20  /  ALT  20  /  AlkPhos  134<H>  12-17    PT/INR - ( 17 Dec 2020 11:23 )   PT: 12.9 sec;   INR: 1.12 ratio        EKG: A paced- LBBB , LAFB    Telemetry: Paced    ECHO:    Moderate (2+) mitral regurgitation is present.   Mild mitral annular calcification is present.   Mild aortic sclerosis is present with normal valvular opening.   Trace pulmonic valvular regurgitation is present.   The left atrium is mildly dilated.   The left ventricle cavity is moderately dilated.   Estimated left ventricular ejection fraction is 20-25 %.   A device wire is seen in the RV and RA.   Normal appearing right ventricle structure and function.   No evidence of pericardial effusion.        87 M with ischemic   CM with ICD with , SVC lead fracture  HTN / CAD    _ EP eval- revision  - Continue Coreg   - Continue ASA   - add ACEi if BP allows

## 2020-12-18 NOTE — CONSULT NOTE ADULT - ASSESSMENT
87 M with inschemc CM with ICD with , SVC lead fracture      Discussed with patient, EP attending and patient's family. Everyone is in agreement with Capping old lead and placing new lead( not lead extraction)      Discussed Molst form with patient- He requests trial of CPR/ intubation and advanced measures for care should the need arise. But he notes that if there is no chance for a meaningful recovery, he would like to be comfortable.    Will follow

## 2020-12-19 ENCOUNTER — TRANSCRIPTION ENCOUNTER (OUTPATIENT)
Age: 85
End: 2020-12-19

## 2020-12-19 VITALS — HEART RATE: 62 BPM | DIASTOLIC BLOOD PRESSURE: 49 MMHG | SYSTOLIC BLOOD PRESSURE: 95 MMHG

## 2020-12-19 DIAGNOSIS — I42.9 CARDIOMYOPATHY, UNSPECIFIED: ICD-10-CM

## 2020-12-19 DIAGNOSIS — Z86.79 PERSONAL HISTORY OF OTHER DISEASES OF THE CIRCULATORY SYSTEM: ICD-10-CM

## 2020-12-19 DIAGNOSIS — I21.9 ACUTE MYOCARDIAL INFARCTION, UNSPECIFIED: ICD-10-CM

## 2020-12-19 LAB
ANION GAP SERPL CALC-SCNC: 8 MMOL/L — SIGNIFICANT CHANGE UP (ref 5–17)
BUN SERPL-MCNC: 19 MG/DL — SIGNIFICANT CHANGE UP (ref 7–23)
CALCIUM SERPL-MCNC: 8.7 MG/DL — SIGNIFICANT CHANGE UP (ref 8.5–10.1)
CHLORIDE SERPL-SCNC: 106 MMOL/L — SIGNIFICANT CHANGE UP (ref 96–108)
CO2 SERPL-SCNC: 26 MMOL/L — SIGNIFICANT CHANGE UP (ref 22–31)
CREAT SERPL-MCNC: 0.92 MG/DL — SIGNIFICANT CHANGE UP (ref 0.5–1.3)
GLUCOSE SERPL-MCNC: 83 MG/DL — SIGNIFICANT CHANGE UP (ref 70–99)
HCT VFR BLD CALC: 39.8 % — SIGNIFICANT CHANGE UP (ref 39–50)
HGB BLD-MCNC: 12.9 G/DL — LOW (ref 13–17)
MCHC RBC-ENTMCNC: 29.9 PG — SIGNIFICANT CHANGE UP (ref 27–34)
MCHC RBC-ENTMCNC: 32.4 GM/DL — SIGNIFICANT CHANGE UP (ref 32–36)
MCV RBC AUTO: 92.1 FL — SIGNIFICANT CHANGE UP (ref 80–100)
PLATELET # BLD AUTO: 181 K/UL — SIGNIFICANT CHANGE UP (ref 150–400)
POTASSIUM SERPL-MCNC: 4.2 MMOL/L — SIGNIFICANT CHANGE UP (ref 3.5–5.3)
POTASSIUM SERPL-SCNC: 4.2 MMOL/L — SIGNIFICANT CHANGE UP (ref 3.5–5.3)
RBC # BLD: 4.32 M/UL — SIGNIFICANT CHANGE UP (ref 4.2–5.8)
RBC # FLD: 13.7 % — SIGNIFICANT CHANGE UP (ref 10.3–14.5)
SODIUM SERPL-SCNC: 140 MMOL/L — SIGNIFICANT CHANGE UP (ref 135–145)
WBC # BLD: 8.72 K/UL — SIGNIFICANT CHANGE UP (ref 3.8–10.5)
WBC # FLD AUTO: 8.72 K/UL — SIGNIFICANT CHANGE UP (ref 3.8–10.5)

## 2020-12-19 PROCEDURE — 99232 SBSQ HOSP IP/OBS MODERATE 35: CPT

## 2020-12-19 PROCEDURE — 99233 SBSQ HOSP IP/OBS HIGH 50: CPT

## 2020-12-19 PROCEDURE — 93010 ELECTROCARDIOGRAM REPORT: CPT

## 2020-12-19 RX ADMIN — Medication 100 MILLIGRAM(S): at 09:05

## 2020-12-19 RX ADMIN — Medication 1000 MILLIGRAM(S): at 05:44

## 2020-12-19 RX ADMIN — Medication 325 MILLIGRAM(S): at 09:05

## 2020-12-19 RX ADMIN — CARVEDILOL PHOSPHATE 6.25 MILLIGRAM(S): 80 CAPSULE, EXTENDED RELEASE ORAL at 09:05

## 2020-12-19 NOTE — PROGRESS NOTE ADULT - PROVIDER SPECIALTY LIST ADULT
Pt to D/C to home.  Pt provided with d/c instructions, including new medications, when medications were last given, and when to take them again.  Pt also informed to f/u with primary in 7 days.  Pt verbalized understanding of all d/c and f/u instructions.  All questions were answered at this time.  Copy of paperwork sent with pt.  Medication/Scripts (cleocin) x 1 sent with pt.  Son to provide transport.  All personal belongings sent with pt.          Hospitalist

## 2020-12-19 NOTE — DISCHARGE NOTE PROVIDER - HOSPITAL COURSE
87 y.o. Male with ischemic CMP with AICD with SVC lead fracture s/p replacement and capping - now medically stable for DC.

## 2020-12-19 NOTE — PROGRESS NOTE ADULT - SUBJECTIVE AND OBJECTIVE BOX
Electrophysiology Nurse Practitioner Progress note:     s/p ICD RV lead fracture, now s/p RV lead revision, ICD generator change (MDT-[place])  POD #1 with Dr Forde  observed overnight on tele-NSR with 23% A-pacing, 0.7% V-pacing 70s-pt had one episode of VT this am (-140) lasting 53 seconds (asymptomatic) Monitor Zone   ( self terminated)  CXR reveals no PTX (report included below)  MDT rep in and interrogated device-normal pacing and sensing thresholds and lead impedances  Patient feels well.  Denies chest pain, shortness of breath, dizziness or palpitations at this time.        EKG:NSR with First Degree  AVB@ 69  TELE: NSR with 23% A-pacing, 0.7% V-pacing 70s-pt had one episode of VT this am (-140) lasting 53 seconds (asymptomatic) Monitor Zone    GENERAL: appears well, OOB to BR  CV: RRR, S1 S2, no murmur, rub, clicks or gallop noted  RESP: LCTA bilaterally, no wheeze, no rhonchi or crackles noted, decreased BS RLL  GI/: soft, NT/ND  NEURO: AOx4, no focal deficits  EXTREMITIES: no edema, clubbing or cyanosis noted  PROCEDURE SITE:  Left CW implant site with Prineo drsg dry and intact -No bleeding or hematoma formation    PROCEDURE DATE:  12/18/2020          INTERPRETATION:  History: Status post pacemaker placement    Chest:  one view.    Comparison: 12/17/2020    AP radiograph of the chest demonstrates subsegmental atelectasis in the RIGHT lower lobe. Pacemaker placed in satisfactory position. The cardiac silhouette is normal in size. Osseous structures show degenerative changes of the shoulders..    Impression:subsegmental atelectasis in the RIGHT lower lobe. Pacemaker placed in satisfactory position.          T(C): 36.5 (12-19-20 @ 05:21), Max: 36.5 (12-19-20 @ 05:21)  HR: 72 (12-19-20 @ 09:00) (60 - 123)  BP: 118/69 (12-19-20 @ 09:00) (100/55 - 147/75)  RR: 16 (12-18-20 @ 13:10) (16 - 17)  SpO2: 96% (12-18-20 @ 23:00) (94% - 100%)  Wt(kg): --  CBC Full  -  ( 19 Dec 2020 05:50 )  WBC Count : 8.72 K/uL  RBC Count : 4.32 M/uL  Hemoglobin : 12.9 g/dL  Hematocrit : 39.8 %  Platelet Count - Automated : 181 K/uL  Mean Cell Volume : 92.1 fl  Mean Cell Hemoglobin : 29.9 pg  Mean Cell Hemoglobin Concentration : 32.4 gm/dL  Auto Neutrophil # : x  Auto Lymphocyte # : x  Auto Monocyte # : x  Auto Eosinophil # : x  Auto Basophil # : x  Auto Neutrophil % : x  Auto Lymphocyte % : x  Auto Monocyte % : x  Auto Eosinophil % : x  Auto Basophil % : x    12-19    140  |  106  |  19  ----------------------------<  83  4.2   |  26  |  0.92    Ca    8.7      19 Dec 2020 05:50    TPro  6.8  /  Alb  3.1<L>  /  TBili  1.1  /  DBili  x   /  AST  20  /  ALT  20  /  AlkPhos  134<H>  12-17    CARDIAC MARKERS ( 17 Dec 2020 11:23 )  <0.015 ng/mL / x     / x     / x     / x        Cardiac Device Implant Post Operative Instructions D/W pt.  - Do not touch the incision until it is completely healed.   - Bruising around the implant site or over the chest, side or arm near the incision is normal, and will take a few weeks to resolve.  -Do not lift the affected arm higher than 90 degrees (shoulder height) in any direction for 4 weeks.   - Do not push, pull or lift anything heavier than 10 lbs (about a gallon of milk) with the affected arm for 4 weeks.     - Do not apply soaps, creams, lotions, ointments or powders to the incision until it is completely healed.  - You may take a shower in 24 hours, and allow the water to run over the incision. However, do not submerge the incision in water: do not swim or soak in bath tubs, hot tubs, swimming pools, etc.   You should call the doctor if:   - You notice redness, drainage, swelling, increased tenderness, hot sensation around the incision, bleeding or incision edges pulling apart.  - Your temperature is greater than 100 degrees F for more than 24 hours.  - You notice swelling or bulging at the incision or around the device that was not there when you left the hospital or is increasing in size.  - You experience increased difficulty breathing.  - You notice new/worsening swelling in your legs and ankles.  - You faint or have dizzy spells.  - You have any questions or concerns regarding your device or the procedure.          MEDICATIONS  (STANDING):  allopurinol 100 milliGRAM(s) Oral two times a day  aspirin 325 milliGRAM(s) Oral daily  carvedilol 6.25 milliGRAM(s) Oral every 12 hours    MEDICATIONS  (PRN):        HPI:  87 y.o M with h/o ischemic CM is s/p ICD for initially primary prevention in 2008, prostate ca, HTN. Patient presents to the ED with c/o ICD alerts which started last night and continued to this morning. Patient called his cardiologist and transferred his ICD readings over to the office and was told to come into the ER since they noticed  SVC lead fracture. In the ER patient was seen by EP and is scheduled for new lead placement.  (17 Dec 2020 17:21)    s/p ICD RV lead fracture, now s/p RV lead revision, ICD generator change (MDT-[facility])  POD #1 with Dr Forde    ASSESSMENT/PLAN:    -stable for discharge from EP standpoint today   -Device implant education and wound care instruction provided  -OOB to chair, then OOB   -continue aspirin, BB as preprocedure  -Maintain K >4, Mg > 2.0  -Plan of care discussed with patient,  -labs, EKG and procedure site assessed  -Follow-up with attending Dr Forde   within 1-2 weeks for device and wound check          Electrophysiology Nurse Practitioner Progress note:     s/p ICD RV lead fracture, now s/p RV lead revision, ICD generator change (MDT[facility])  POD #1 with Dr Forde  observed overnight on tele-NSR with 23% A-pacing, 0.7% V-pacing 70s-pt had one episode of VT this am (-140) lasting 53 seconds (asymptomatic) Monitor Zone   ( self terminated)  CXR reveals no PTX (report included below)  MDT rep in and interrogated device-normal pacing and sensing thresholds and lead impedances  Patient feels well.  Denies chest pain, shortness of breath, dizziness or palpitations at this time.      General: No fatigue, no fevers/chills  Respiratory: No dyspnea, no cough, no wheeze  CV: No chest pain, no palpitations  Abd: No nausea  Neuro: No headache, no dizziness    EKG:NSR with First Degree  AVB@ 69  TELE: NSR with 23% A-pacing, 0.7% V-pacing 70s-pt had one episode of VT this am (-140) lasting 53 seconds (asymptomatic) Monitor Zone    GENERAL: appears well, OOB to BR  CV: RRR, S1 S2, no murmur, rub, clicks or gallop noted  RESP: LCTA bilaterally, no wheeze, no rhonchi or crackles noted, decreased BS RLL  GI/: soft, NT/ND  NEURO: AOx4, no focal deficits  EXTREMITIES: no edema, clubbing or cyanosis noted  PROCEDURE SITE:  Left CW implant site with Prineo drsg dry and intact -No bleeding or hematoma formation    PROCEDURE DATE:  12/18/2020          INTERPRETATION:  History: Status post pacemaker placement    Chest:  one view.    Comparison: 12/17/2020    AP radiograph of the chest demonstrates subsegmental atelectasis in the RIGHT lower lobe. Pacemaker placed in satisfactory position. The cardiac silhouette is normal in size. Osseous structures show degenerative changes of the shoulders..    Impression:subsegmental atelectasis in the RIGHT lower lobe. Pacemaker placed in satisfactory position.          T(C): 36.5 (12-19-20 @ 05:21), Max: 36.5 (12-19-20 @ 05:21)  HR: 72 (12-19-20 @ 09:00) (60 - 123)  BP: 118/69 (12-19-20 @ 09:00) (100/55 - 147/75)  RR: 16 (12-18-20 @ 13:10) (16 - 17)  SpO2: 96% (12-18-20 @ 23:00) (94% - 100%)  Wt(kg): --  CBC Full  -  ( 19 Dec 2020 05:50 )  WBC Count : 8.72 K/uL  RBC Count : 4.32 M/uL  Hemoglobin : 12.9 g/dL  Hematocrit : 39.8 %  Platelet Count - Automated : 181 K/uL  Mean Cell Volume : 92.1 fl  Mean Cell Hemoglobin : 29.9 pg  Mean Cell Hemoglobin Concentration : 32.4 gm/dL  Auto Neutrophil # : x  Auto Lymphocyte # : x  Auto Monocyte # : x  Auto Eosinophil # : x  Auto Basophil # : x  Auto Neutrophil % : x  Auto Lymphocyte % : x  Auto Monocyte % : x  Auto Eosinophil % : x  Auto Basophil % : x    12-19    140  |  106  |  19  ----------------------------<  83  4.2   |  26  |  0.92    Ca    8.7      19 Dec 2020 05:50    TPro  6.8  /  Alb  3.1<L>  /  TBili  1.1  /  DBili  x   /  AST  20  /  ALT  20  /  AlkPhos  134<H>  12-17    CARDIAC MARKERS ( 17 Dec 2020 11:23 )  <0.015 ng/mL / x     / x     / x     / x        Cardiac Device Implant Post Operative Instructions D/W pt.  - Do not touch the incision until it is completely healed.   - Bruising around the implant site or over the chest, side or arm near the incision is normal, and will take a few weeks to resolve.  -Do not lift the affected arm higher than 90 degrees (shoulder height) in any direction for 4 weeks.   - Do not push, pull or lift anything heavier than 10 lbs (about a gallon of milk) with the affected arm for 4 weeks.     - Do not apply soaps, creams, lotions, ointments or powders to the incision until it is completely healed.  - You may take a shower in 24 hours, and allow the water to run over the incision. However, do not submerge the incision in water: do not swim or soak in bath tubs, hot tubs, swimming pools, etc.   You should call the doctor if:   - You notice redness, drainage, swelling, increased tenderness, hot sensation around the incision, bleeding or incision edges pulling apart.  - Your temperature is greater than 100 degrees F for more than 24 hours.  - You notice swelling or bulging at the incision or around the device that was not there when you left the hospital or is increasing in size.  - You experience increased difficulty breathing.  - You notice new/worsening swelling in your legs and ankles.  - You faint or have dizzy spells.  - You have any questions or concerns regarding your device or the procedure.          MEDICATIONS  (STANDING):  allopurinol 100 milliGRAM(s) Oral two times a day  aspirin 325 milliGRAM(s) Oral daily  carvedilol 6.25 milliGRAM(s) Oral every 12 hours    MEDICATIONS  (PRN):        HPI:  87 y.o M with h/o ischemic CM is s/p ICD for initially primary prevention in 2008, prostate ca, HTN. Patient presents to the ED with c/o ICD alerts which started last night and continued to this morning. Patient called his cardiologist and transferred his ICD readings over to the office and was told to come into the ER since they noticed  SVC lead fracture. In the ER patient was seen by EP and is scheduled for new lead placement.  (17 Dec 2020 17:21)    s/p ICD RV lead fracture, now s/p RV lead revision, ICD generator change (MDT-[facility])  POD #1 with Dr Forde    ASSESSMENT/PLAN:    -stable for discharge from EP standpoint today   -Device implant education and wound care instruction provided  -OOB to chair, then OOB   -continue aspirin, BB as preprocedure  -Maintain K >4, Mg > 2.0  -Plan of care discussed with patient,  -labs, EKG and procedure site assessed  -Follow-up with attending Dr Fored   within 1-2 weeks for device and wound check

## 2020-12-19 NOTE — DISCHARGE NOTE PROVIDER - NSDCFUSCHEDAPPT_GEN_ALL_CORE_FT
RODO SYLVESTER ; 12/21/2020 ; [facility] [address] ; 01/04/2021 ; [facility] [address]

## 2020-12-19 NOTE — PROGRESS NOTE ADULT - ASSESSMENT
87 y.o. Male with ischemic CMP with AICD with SVC lead fracture s/p replacement and capping - now medically stable for DC.        
87 M with inschemc CM with ICD with , SVC lead fracture    
12/18/20: 87 M with ischemic CM with ICD with , SVC lead fracture  Discussed with patient, EP attending and patient's family. Everyone is in agreement with Capping old lead and placing new lead( not lead extraction)  Discussed Molst form with patient- He requests trial of CPR/ intubation and advanced measures for care should the need arise. But he notes that if there is no chance for a meaningful recovery, he would like to be comfortable.  Will follow    12/19/20:  Doing well s/p AICD lead fracture and successful lead replacement and capping of old lead.  Feeling good this AM, resting comfortable with only mild incisional discomfort.  For probable discharge home today.  Follow up with Dr Almanzar in the office as an outpt and with Dr Forde as scheduled.

## 2020-12-19 NOTE — PROGRESS NOTE ADULT - SUBJECTIVE AND OBJECTIVE BOX
CC: Malfunctioning ICD (19 Dec 2020 09:59)    HPI: 87 y.o M with h/o ischemic CM is s/p ICD for initially primary prevention in 2008, prostate ca, HTN. Patient presents to the ED with c/o ICD alerts which started last night and continued to this morning. Patient called his cardiologist and transferred his ICD readings over to the office and was told to come into the ER since they noticed  SVC lead fracture. In the ER patient was seen by EP and is scheduled for new lead placement.  (17 Dec 2020 17:21)    INTERVAL HPI/OVERNIGHT EVENTS: no complaints, awaiting to go home, episode of asymptomatic VT this am  Other ROS reviewed and neg     Vital Signs Last 24 Hrs  T(C): 36.5 (19 Dec 2020 05:21), Max: 36.5 (19 Dec 2020 05:21)  T(F): 97.7 (19 Dec 2020 05:21), Max: 97.7 (19 Dec 2020 05:21)  HR: 69 (19 Dec 2020 12:28) (60 - 123)  BP: 112/59 (19 Dec 2020 12:28) (94/49 - 144/62)  BP(mean): 72 (19 Dec 2020 12:28) (61 - 87)  RR: 16 (18 Dec 2020 13:10) (16 - 17)  SpO2: 96% (18 Dec 2020 23:00) (94% - 98%)                        12.9   8.72  )-----------( 181      ( 19 Dec 2020 05:50 )             39.8     19 Dec 2020 05:50    140    |  106    |  19     ----------------------------<  83     4.2     |  26     |  0.92     Ca    8.7        19 Dec 2020 05:50    MEDICATIONS  (STANDING):  allopurinol 100 milliGRAM(s) Oral two times a day  aspirin 325 milliGRAM(s) Oral daily  carvedilol 6.25 milliGRAM(s) Oral every 12 hours    RADIOLOGY & ADDITIONAL TESTS: personally visualized    PHYSICAL EXAM:    General: elderly male in no acute distress  Eyes: PERRLA, EOMI; conjunctiva and sclera clear  Head: Normocephalic; atraumatic  ENMT: No nasal discharge; airway clear  Neck: Supple; non tender; no masses  Respiratory: No wheezes, rales or rhonchi  Cardiovascular: Regular rate and rhythm. S1 and S2   Gastrointestinal: Soft non-tender non-distended; Normal bowel sounds  Genitourinary: No costovertebral angle tenderness  Extremities: No clubbing, cyanosis or edema  Vascular: Peripheral pulses palpable 2+ bilaterally  Neurological: Alert and oriented x4  Skin: Warm and dry.   Musculoskeletal: Normal gait, tone, without deformities  Psychiatric: Cooperative and appropriate

## 2020-12-19 NOTE — DISCHARGE NOTE PROVIDER - CARE PROVIDER_API CALL
Aisha Forde)  Cardiac Electrophysiology; Cardiovascular Disease; Internal Medicine  [address] [place] [address]  Phone: [phone]  Fax: [phone]  Follow Up Time:     Boy Waldron  CARDIOVASCULAR DISEASE  [address] [facility], [address]  [place] [address]  Phone: [phone]  Fax: [phone]  Follow Up Time:

## 2020-12-19 NOTE — PROGRESS NOTE ADULT - SUBJECTIVE AND OBJECTIVE BOX
CHIEF COMPLAINT: Patient is a 87y old  Male who presents with a chief complaint of Malfunctioning ICD (17 Dec 2020 17:21)      HPI: 12/18/20:  87 y.o M with h/o ischemic CM is s/p ICD for initially primary prevention in 2008, prostate ca, HTN. Patient presents to the ED with c/o ICD alerts which started last night and continued to this morning. Patient called his cardiologist and transferred his ICD readings over to the office and was told to come into the ER since they noticed  SVC lead fracture. In the ER patient was seen by EP and is scheduled for new lead placement.  (17 Dec 2020 17:21)    12/19/20:  Doing well s/p AICD lead fracture and successful lead replacement and capping of old lead.  Feeling good this AM, resting comfortable with only mild incisional discomfort.  For probable discharge home today.      PMHx: PAST MEDICAL & SURGICAL HISTORY:  Prostate cancer    Myocardial infarction  54 years old    H/O ventricular tachycardia  PPM placed 2008    Hypertension    H/O prostate biopsy  cancer treated with prostate seeding    H/O pilonidal cyst    Elective surgery  right middle finger surgery, no hardware    History of tonsillectomy  as child    Implantable cardioverter-defibrillator (ICD) in situ  2008          Soc Hx:       Allergies: Allergies    dexamethasone (Hives)  statins (Rash)    Intolerances          REVIEW OF SYSTEMS:    CONSTITUTIONAL: No weakness, fevers or chills  EYES/ENT: No visual changes;  No vertigo or throat pain   NECK: No pain or stiffness  RESPIRATORY: No cough, wheezing, hemoptysis; No shortness of breath  CARDIOVASCULAR: No chest pain or palpitations  GASTROINTESTINAL: No abdominal or epigastric pain. No nausea, vomiting, or hematemesis; No diarrhea or constipation. No melena or hematochezia.  GENITOURINARY: No dysuria, frequency or hematuria  NEUROLOGICAL: No numbness or weakness  SKIN: No itching, burning, rashes, or lesions   All other review of systems is negative unless indicated above      Vital Signs Last 24 Hrs  T(C): 36.5 (19 Dec 2020 05:21), Max: 36.5 (19 Dec 2020 05:21)  T(F): 97.7 (19 Dec 2020 05:21), Max: 97.7 (19 Dec 2020 05:21)  HR: 66 (19 Dec 2020 07:00) (60 - 123)  BP: 111/56 (19 Dec 2020 07:00) (100/55 - 147/75)  BP(mean): 70 (19 Dec 2020 07:00) (64 - 87)  RR: 16 (18 Dec 2020 13:10) (16 - 18)  SpO2: 96% (18 Dec 2020 23:00) (94% - 100%)      PHYSICAL EXAM:   Constitutional: NAD, awake and alert, well-developed  HEENT: PERR, EOMI, Normal Hearing, MMM  Neck: Soft and supple, No LAD, No JVD  Respiratory: Breath sounds are clear bilaterally, No wheezing, rales or rhonchi  Cardiovascular: S1 and S2, regular rate and rhythm, no Murmurs, gallops or rubs  Gastrointestinal: Bowel Sounds present, soft, nontender, nondistended, no guarding, no rebound  Extremities: No peripheral edema  Vascular: 2+ peripheral pulses  Neurological: A/O x 3, no focal deficits  Musculoskeletal: 5/5 strength b/l upper and lower extremities  Skin: No rashes      MEDICATIONS  (STANDING):  allopurinol 100 milliGRAM(s) Oral two times a day  aspirin 325 milliGRAM(s) Oral daily  carvedilol 6.25 milliGRAM(s) Oral every 12 hours        LABS: All Labs Reviewed:                        12.4   6.13  )-----------( 178      ( 17 Dec 2020 11:23 )             38.7     12-17    140  |  108  |  18  ----------------------------<  98  4.0   |  31  |  0.92    Ca    8.9      17 Dec 2020 11:23    TPro  6.8  /  Alb  3.1<L>  /  TBili  1.1  /  DBili  x   /  AST  20  /  ALT  20  /  AlkPhos  134<H>  12-17    PT/INR - ( 17 Dec 2020 11:23 )   PT: 12.9 sec;   INR: 1.12 ratio         PTT - ( 17 Dec 2020 11:23 )  PTT:35.8 sec  CARDIAC MARKERS ( 17 Dec 2020 11:23 )  <0.015 ng/mL / x     / x     / x     / x              EKG: A paced- LBBB , LAFB    Telemetry: Paced    ECHO: pending

## 2020-12-19 NOTE — DISCHARGE NOTE PROVIDER - NSDCCPCAREPLAN_GEN_ALL_CORE_FT
PRINCIPAL DISCHARGE DIAGNOSIS  Diagnosis: Pacemaker lead fracture, initial encounter  Assessment and Plan of Treatment: s/p replacement

## 2020-12-19 NOTE — PROGRESS NOTE ADULT - PROBLEM/PLAN-1
Pharmacy states that they did not receive his script for 40mg furosemide bid  
DISPLAY PLAN FREE TEXT

## 2020-12-20 ENCOUNTER — APPOINTMENT (OUTPATIENT)
Dept: ELECTROPHYSIOLOGY | Facility: CLINIC | Age: 85
End: 2020-12-20
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
## 2020-12-26 DIAGNOSIS — Z85.46 PERSONAL HISTORY OF MALIGNANT NEOPLASM OF PROSTATE: ICD-10-CM

## 2020-12-26 DIAGNOSIS — I25.5 ISCHEMIC CARDIOMYOPATHY: ICD-10-CM

## 2020-12-26 DIAGNOSIS — I47.2 VENTRICULAR TACHYCARDIA: ICD-10-CM

## 2020-12-26 DIAGNOSIS — I50.22 CHRONIC SYSTOLIC (CONGESTIVE) HEART FAILURE: ICD-10-CM

## 2020-12-26 DIAGNOSIS — I25.10 ATHEROSCLEROTIC HEART DISEASE OF NATIVE CORONARY ARTERY WITHOUT ANGINA PECTORIS: ICD-10-CM

## 2020-12-26 DIAGNOSIS — M10.9 GOUT, UNSPECIFIED: ICD-10-CM

## 2020-12-26 DIAGNOSIS — I11.0 HYPERTENSIVE HEART DISEASE WITH HEART FAILURE: ICD-10-CM

## 2020-12-26 DIAGNOSIS — T82.110A BREAKDOWN (MECHANICAL) OF CARDIAC ELECTRODE, INITIAL ENCOUNTER: ICD-10-CM

## 2020-12-26 DIAGNOSIS — I25.2 OLD MYOCARDIAL INFARCTION: ICD-10-CM
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
## 2020-12-26 DIAGNOSIS — Z92.3 PERSONAL HISTORY OF IRRADIATION: ICD-10-CM

## 2021-01-04 ENCOUNTER — APPOINTMENT (OUTPATIENT)
Dept: ELECTROPHYSIOLOGY | Facility: CLINIC | Age: 86
End: 2021-01-04
Payer: MEDICARE

## 2021-01-04 VITALS
HEART RATE: 67 BPM | HEIGHT: 68 IN | SYSTOLIC BLOOD PRESSURE: 116 MMHG | OXYGEN SATURATION: 96 % | DIASTOLIC BLOOD PRESSURE: 69 MMHG | WEIGHT: 159 LBS | BODY MASS INDEX: 24.1 KG/M2

## 2021-01-04 PROCEDURE — 99024 POSTOP FOLLOW-UP VISIT: CPT

## 2021-01-04 PROCEDURE — 93284 PRGRMG EVAL IMPLANTABLE DFB: CPT

## 2021-01-04 NOTE — REASON FOR VISIT
[Follow-Up - From Hospitalization] : follow-up of a recent hospitalization for [FreeTextEntry1] : ref Dr Almanzar

## 2021-01-04 NOTE — PHYSICAL EXAM
[Normal Appearance] : normal appearance [JVD Elevated _____cm] : JVD elevated [unfilled] ~U cm above clavicle [] : no respiratory distress [Respiration, Rhythm And Depth] : normal respiratory rhythm and effort [Heart Rate And Rhythm] : heart rate and rhythm were normal [Heart Sounds] : normal S1 and S2 [Abnormal Walk] : normal gait [Oriented To Time, Place, And Person] : oriented to person, place, and time [Impaired Insight] : insight and judgment were intact [Skin Color & Pigmentation] : normal skin color and pigmentation

## 2021-01-04 NOTE — HISTORY OF PRESENT ILLNESS
[FreeTextEntry1] : 87 y.o M with h/o ischemic CM LVEF 30%, is s/p ICD for initially primary prevention in 10/2008 (MDT),subsequently had VT with appropriate ICD shock treatment , prostate ca, HTN. On 12/17/20 ICD alarmed for high shock impedance in both RV and SVC coils due to lead fracture.  We discussed options of lead extraction versus lead capping and new RV defibrillator lead insertion.  Considering his age patient prefers to undergo a less invasive surgery with new ICD lead insertion and capping of fractured ICD lead.  Procedure was successful on 12/18/2020 with new single coil ICD lead MDT and capping of the fractured ICD lead.  Patient also has sinus node dysfunction with atrial pacing 70%.\par ICD pocket is healing well Prineo Dermabond in place no hematoma or tenderness.  Patient denies any chest pain shortness of breath dizziness.

## 2021-01-04 NOTE — DISCUSSION/SUMMARY
[FreeTextEntry1] : 87-year-old male with ischemic cardiomyopathy LVEF 30% initial ICD implant MDT 10/2008 recent ICD lead fracture who underwent new ICD lead Insertion and abandoned fractured lead.  Patient is doing well pocket site healing well no signs of infection.  \par -ICD interrogation shows normal lead testing RV threshold 0.75V@0.4ms RA threshold 1V@0.4ms, nml sensing and impedance,  9.1%, AP 55%; \par multiple recorded NSVT episodes up to 33 beats spontaneously terminated, no treated episodes\par -because of borderline hypotension i favor anisa to switch from Coreg to toprolol xl 50mg daily and evantually if well tolerated will increase to bid\par -continue CHF medical therapy as per cardiology Dr. Almanzar

## 2021-03-21 ENCOUNTER — APPOINTMENT (OUTPATIENT)
Dept: ELECTROPHYSIOLOGY | Facility: CLINIC | Age: 86
End: 2021-03-21
Payer: MEDICARE

## 2021-03-22 ENCOUNTER — NON-APPOINTMENT (OUTPATIENT)
Age: 86
End: 2021-03-22

## 2021-03-22 PROCEDURE — 93296 REM INTERROG EVL PM/IDS: CPT

## 2021-03-22 PROCEDURE — 93295 DEV INTERROG REMOTE 1/2/MLT: CPT

## 2021-03-23 ENCOUNTER — NON-APPOINTMENT (OUTPATIENT)
Age: 86
End: 2021-03-23

## 2021-03-23 ENCOUNTER — APPOINTMENT (OUTPATIENT)
Dept: ELECTROPHYSIOLOGY | Facility: CLINIC | Age: 86
End: 2021-03-23
Payer: MEDICARE

## 2021-03-23 VITALS
SYSTOLIC BLOOD PRESSURE: 124 MMHG | DIASTOLIC BLOOD PRESSURE: 80 MMHG | OXYGEN SATURATION: 100 % | RESPIRATION RATE: 16 BRPM | HEIGHT: 68 IN | HEART RATE: 76 BPM | BODY MASS INDEX: 24.1 KG/M2 | WEIGHT: 159 LBS

## 2021-03-23 PROCEDURE — 93283 PRGRMG EVAL IMPLANTABLE DFB: CPT

## 2021-03-23 RX ORDER — METOPROLOL SUCCINATE 50 MG/1
50 TABLET, EXTENDED RELEASE ORAL DAILY
Qty: 90 | Refills: 1 | Status: DISCONTINUED | COMMUNITY
Start: 2021-01-04 | End: 2021-03-23

## 2021-03-27 NOTE — ED ADULT NURSE NOTE - NS ED NURSE LEVEL OF CONSCIOUSNESS SPEECH
Speaking Coherently No fever/chills, No photophobia/eye pain/changes in vision, No ear pain/sore throat/dysphagia, No chest pain/palpitations, no SOB/cough/wheeze/stridor, No abdominal pain, No N/V/D, no dysuria/frequency/discharge, No neck/back pain, no rash,neuro as per hpi

## 2021-05-24 ENCOUNTER — NON-APPOINTMENT (OUTPATIENT)
Age: 86
End: 2021-05-24

## 2021-05-25 ENCOUNTER — NON-APPOINTMENT (OUTPATIENT)
Age: 86
End: 2021-05-25

## 2021-05-25 ENCOUNTER — APPOINTMENT (OUTPATIENT)
Dept: ELECTROPHYSIOLOGY | Facility: CLINIC | Age: 86
End: 2021-05-25
Payer: MEDICARE

## 2021-05-25 VITALS
WEIGHT: 155 LBS | OXYGEN SATURATION: 97 % | DIASTOLIC BLOOD PRESSURE: 63 MMHG | HEART RATE: 62 BPM | SYSTOLIC BLOOD PRESSURE: 109 MMHG | HEIGHT: 68 IN | BODY MASS INDEX: 23.49 KG/M2 | RESPIRATION RATE: 16 BRPM

## 2021-05-25 PROCEDURE — 93283 PRGRMG EVAL IMPLANTABLE DFB: CPT

## 2021-05-25 RX ORDER — CARVEDILOL 6.25 MG/1
6.25 TABLET, FILM COATED ORAL TWICE DAILY
Refills: 0 | Status: DISCONTINUED | COMMUNITY
End: 2021-05-25

## 2021-05-27 ENCOUNTER — NON-APPOINTMENT (OUTPATIENT)
Age: 86
End: 2021-05-27

## 2021-06-21 ENCOUNTER — APPOINTMENT (OUTPATIENT)
Dept: ELECTROPHYSIOLOGY | Facility: CLINIC | Age: 86
End: 2021-06-21

## 2021-06-22 ENCOUNTER — APPOINTMENT (OUTPATIENT)
Dept: ELECTROPHYSIOLOGY | Facility: CLINIC | Age: 86
End: 2021-06-22
Payer: MEDICARE

## 2021-06-22 VITALS
RESPIRATION RATE: 16 BRPM | HEIGHT: 68 IN | WEIGHT: 155 LBS | SYSTOLIC BLOOD PRESSURE: 98 MMHG | OXYGEN SATURATION: 98 % | DIASTOLIC BLOOD PRESSURE: 55 MMHG | BODY MASS INDEX: 23.49 KG/M2

## 2021-06-22 PROCEDURE — 93283 PRGRMG EVAL IMPLANTABLE DFB: CPT

## 2021-08-06 ENCOUNTER — INPATIENT (INPATIENT)
Facility: HOSPITAL | Age: 86
LOS: 1 days | Discharge: HOME | DRG: 310 | End: 2021-08-08
Attending: FAMILY MEDICINE | Admitting: INTERNAL MEDICINE
Payer: MEDICARE

## 2021-08-06 VITALS
HEART RATE: 83 BPM | WEIGHT: 154.1 LBS | OXYGEN SATURATION: 97 % | TEMPERATURE: 98 F | SYSTOLIC BLOOD PRESSURE: 106 MMHG | DIASTOLIC BLOOD PRESSURE: 73 MMHG | RESPIRATION RATE: 18 BRPM | HEIGHT: 68 IN

## 2021-08-06 DIAGNOSIS — Z98.890 OTHER SPECIFIED POSTPROCEDURAL STATES: Chronic | ICD-10-CM

## 2021-08-06 DIAGNOSIS — Z95.810 PRESENCE OF AUTOMATIC (IMPLANTABLE) CARDIAC DEFIBRILLATOR: Chronic | ICD-10-CM

## 2021-08-06 DIAGNOSIS — I47.2 VENTRICULAR TACHYCARDIA: ICD-10-CM

## 2021-08-06 DIAGNOSIS — Z41.9 ENCOUNTER FOR PROCEDURE FOR PURPOSES OTHER THAN REMEDYING HEALTH STATE, UNSPECIFIED: Chronic | ICD-10-CM

## 2021-08-06 DIAGNOSIS — Z90.89 ACQUIRED ABSENCE OF OTHER ORGANS: Chronic | ICD-10-CM

## 2021-08-06 DIAGNOSIS — Z87.2 PERSONAL HISTORY OF DISEASES OF THE SKIN AND SUBCUTANEOUS TISSUE: Chronic | ICD-10-CM

## 2021-08-06 LAB
ALBUMIN SERPL ELPH-MCNC: 3.3 G/DL — SIGNIFICANT CHANGE UP (ref 3.3–5)
ALP SERPL-CCNC: 123 U/L — HIGH (ref 40–120)
ALT FLD-CCNC: 23 U/L — SIGNIFICANT CHANGE UP (ref 12–78)
ANION GAP SERPL CALC-SCNC: 4 MMOL/L — LOW (ref 5–17)
AST SERPL-CCNC: 23 U/L — SIGNIFICANT CHANGE UP (ref 15–37)
BASOPHILS # BLD AUTO: 0.05 K/UL — SIGNIFICANT CHANGE UP (ref 0–0.2)
BASOPHILS NFR BLD AUTO: 0.5 % — SIGNIFICANT CHANGE UP (ref 0–2)
BILIRUB SERPL-MCNC: 0.6 MG/DL — SIGNIFICANT CHANGE UP (ref 0.2–1.2)
BUN SERPL-MCNC: 25 MG/DL — HIGH (ref 7–23)
CALCIUM SERPL-MCNC: 8.7 MG/DL — SIGNIFICANT CHANGE UP (ref 8.5–10.1)
CHLORIDE SERPL-SCNC: 108 MMOL/L — SIGNIFICANT CHANGE UP (ref 96–108)
CO2 SERPL-SCNC: 30 MMOL/L — SIGNIFICANT CHANGE UP (ref 22–31)
CREAT SERPL-MCNC: 0.99 MG/DL — SIGNIFICANT CHANGE UP (ref 0.5–1.3)
EOSINOPHIL # BLD AUTO: 0.27 K/UL — SIGNIFICANT CHANGE UP (ref 0–0.5)
EOSINOPHIL NFR BLD AUTO: 2.5 % — SIGNIFICANT CHANGE UP (ref 0–6)
GLUCOSE SERPL-MCNC: 90 MG/DL — SIGNIFICANT CHANGE UP (ref 70–99)
HCT VFR BLD CALC: 36.3 % — LOW (ref 39–50)
HGB BLD-MCNC: 11.9 G/DL — LOW (ref 13–17)
IMM GRANULOCYTES NFR BLD AUTO: 0.3 % — SIGNIFICANT CHANGE UP (ref 0–1.5)
LYMPHOCYTES # BLD AUTO: 1.41 K/UL — SIGNIFICANT CHANGE UP (ref 1–3.3)
LYMPHOCYTES # BLD AUTO: 13 % — SIGNIFICANT CHANGE UP (ref 13–44)
MAGNESIUM SERPL-MCNC: 1.9 MG/DL — SIGNIFICANT CHANGE UP (ref 1.6–2.6)
MCHC RBC-ENTMCNC: 30.1 PG — SIGNIFICANT CHANGE UP (ref 27–34)
MCHC RBC-ENTMCNC: 32.8 GM/DL — SIGNIFICANT CHANGE UP (ref 32–36)
MCV RBC AUTO: 91.9 FL — SIGNIFICANT CHANGE UP (ref 80–100)
MONOCYTES # BLD AUTO: 0.91 K/UL — HIGH (ref 0–0.9)
MONOCYTES NFR BLD AUTO: 8.4 % — SIGNIFICANT CHANGE UP (ref 2–14)
NEUTROPHILS # BLD AUTO: 8.15 K/UL — HIGH (ref 1.8–7.4)
NEUTROPHILS NFR BLD AUTO: 75.3 % — SIGNIFICANT CHANGE UP (ref 43–77)
NT-PROBNP SERPL-SCNC: 5160 PG/ML — HIGH (ref 0–450)
PHOSPHATE SERPL-MCNC: 3.1 MG/DL — SIGNIFICANT CHANGE UP (ref 2.5–4.5)
PLATELET # BLD AUTO: 185 K/UL — SIGNIFICANT CHANGE UP (ref 150–400)
POTASSIUM SERPL-MCNC: 3.4 MMOL/L — LOW (ref 3.5–5.3)
POTASSIUM SERPL-SCNC: 3.4 MMOL/L — LOW (ref 3.5–5.3)
PROT SERPL-MCNC: 6.5 GM/DL — SIGNIFICANT CHANGE UP (ref 6–8.3)
RBC # BLD: 3.95 M/UL — LOW (ref 4.2–5.8)
RBC # FLD: 14.5 % — SIGNIFICANT CHANGE UP (ref 10.3–14.5)
SARS-COV-2 RNA SPEC QL NAA+PROBE: SIGNIFICANT CHANGE UP
SODIUM SERPL-SCNC: 142 MMOL/L — SIGNIFICANT CHANGE UP (ref 135–145)
TROPONIN I SERPL-MCNC: 0.26 NG/ML — HIGH (ref 0.01–0.04)
TROPONIN I SERPL-MCNC: 0.28 NG/ML — HIGH (ref 0.01–0.04)
TROPONIN I SERPL-MCNC: <0.015 NG/ML — SIGNIFICANT CHANGE UP (ref 0.01–0.04)
WBC # BLD: 10.82 K/UL — HIGH (ref 3.8–10.5)
WBC # FLD AUTO: 10.82 K/UL — HIGH (ref 3.8–10.5)

## 2021-08-06 PROCEDURE — 83735 ASSAY OF MAGNESIUM: CPT

## 2021-08-06 PROCEDURE — 93283 PRGRMG EVAL IMPLANTABLE DFB: CPT | Mod: 26

## 2021-08-06 PROCEDURE — 93010 ELECTROCARDIOGRAM REPORT: CPT

## 2021-08-06 PROCEDURE — 71045 X-RAY EXAM CHEST 1 VIEW: CPT | Mod: 26

## 2021-08-06 PROCEDURE — 99285 EMERGENCY DEPT VISIT HI MDM: CPT | Mod: 25

## 2021-08-06 PROCEDURE — 96374 THER/PROPH/DIAG INJ IV PUSH: CPT

## 2021-08-06 PROCEDURE — 84484 ASSAY OF TROPONIN QUANT: CPT

## 2021-08-06 PROCEDURE — 99285 EMERGENCY DEPT VISIT HI MDM: CPT

## 2021-08-06 PROCEDURE — 99223 1ST HOSP IP/OBS HIGH 75: CPT

## 2021-08-06 PROCEDURE — 86769 SARS-COV-2 COVID-19 ANTIBODY: CPT

## 2021-08-06 PROCEDURE — 84443 ASSAY THYROID STIM HORMONE: CPT

## 2021-08-06 PROCEDURE — 80048 BASIC METABOLIC PNL TOTAL CA: CPT

## 2021-08-06 PROCEDURE — 36415 COLL VENOUS BLD VENIPUNCTURE: CPT

## 2021-08-06 PROCEDURE — 85027 COMPLETE CBC AUTOMATED: CPT

## 2021-08-06 RX ORDER — MAGNESIUM SULFATE 500 MG/ML
1 VIAL (ML) INJECTION ONCE
Refills: 0 | Status: COMPLETED | OUTPATIENT
Start: 2021-08-06 | End: 2021-08-06

## 2021-08-06 RX ORDER — AMIODARONE HYDROCHLORIDE 400 MG/1
TABLET ORAL
Refills: 0 | Status: DISCONTINUED | OUTPATIENT
Start: 2021-08-06 | End: 2021-08-08

## 2021-08-06 RX ORDER — LANOLIN ALCOHOL/MO/W.PET/CERES
3 CREAM (GRAM) TOPICAL AT BEDTIME
Refills: 0 | Status: DISCONTINUED | OUTPATIENT
Start: 2021-08-06 | End: 2021-08-08

## 2021-08-06 RX ORDER — AMIODARONE HYDROCHLORIDE 400 MG/1
400 TABLET ORAL DAILY
Refills: 0 | Status: CANCELLED | OUTPATIENT
Start: 2021-08-10 | End: 2021-08-08

## 2021-08-06 RX ORDER — ACETAMINOPHEN 500 MG
650 TABLET ORAL EVERY 6 HOURS
Refills: 0 | Status: DISCONTINUED | OUTPATIENT
Start: 2021-08-06 | End: 2021-08-08

## 2021-08-06 RX ORDER — AMIODARONE HYDROCHLORIDE 400 MG/1
400 TABLET ORAL ONCE
Refills: 0 | Status: COMPLETED | OUTPATIENT
Start: 2021-08-06 | End: 2021-08-06

## 2021-08-06 RX ORDER — ASPIRIN/CALCIUM CARB/MAGNESIUM 324 MG
325 TABLET ORAL DAILY
Refills: 0 | Status: DISCONTINUED | OUTPATIENT
Start: 2021-08-06 | End: 2021-08-08

## 2021-08-06 RX ORDER — CARVEDILOL PHOSPHATE 80 MG/1
6.25 CAPSULE, EXTENDED RELEASE ORAL
Refills: 0 | Status: DISCONTINUED | OUTPATIENT
Start: 2021-08-06 | End: 2021-08-08

## 2021-08-06 RX ORDER — POTASSIUM CHLORIDE 20 MEQ
40 PACKET (EA) ORAL ONCE
Refills: 0 | Status: COMPLETED | OUTPATIENT
Start: 2021-08-06 | End: 2021-08-06

## 2021-08-06 RX ORDER — ALLOPURINOL 300 MG
100 TABLET ORAL
Refills: 0 | Status: DISCONTINUED | OUTPATIENT
Start: 2021-08-06 | End: 2021-08-08

## 2021-08-06 RX ORDER — AMIODARONE HYDROCHLORIDE 400 MG/1
400 TABLET ORAL EVERY 8 HOURS
Refills: 0 | Status: DISCONTINUED | OUTPATIENT
Start: 2021-08-06 | End: 2021-08-08

## 2021-08-06 RX ORDER — CARVEDILOL PHOSPHATE 80 MG/1
1 CAPSULE, EXTENDED RELEASE ORAL
Qty: 0 | Refills: 0 | DISCHARGE

## 2021-08-06 RX ORDER — ONDANSETRON 8 MG/1
4 TABLET, FILM COATED ORAL EVERY 8 HOURS
Refills: 0 | Status: DISCONTINUED | OUTPATIENT
Start: 2021-08-06 | End: 2021-08-08

## 2021-08-06 RX ORDER — ENOXAPARIN SODIUM 100 MG/ML
40 INJECTION SUBCUTANEOUS DAILY
Refills: 0 | Status: DISCONTINUED | OUTPATIENT
Start: 2021-08-06 | End: 2021-08-08

## 2021-08-06 RX ADMIN — AMIODARONE HYDROCHLORIDE 400 MILLIGRAM(S): 400 TABLET ORAL at 21:13

## 2021-08-06 RX ADMIN — CARVEDILOL PHOSPHATE 6.25 MILLIGRAM(S): 80 CAPSULE, EXTENDED RELEASE ORAL at 21:13

## 2021-08-06 RX ADMIN — AMIODARONE HYDROCHLORIDE 400 MILLIGRAM(S): 400 TABLET ORAL at 17:17

## 2021-08-06 RX ADMIN — Medication 40 MILLIEQUIVALENT(S): at 17:17

## 2021-08-06 RX ADMIN — Medication 100 GRAM(S): at 17:17

## 2021-08-06 RX ADMIN — Medication 100 MILLIGRAM(S): at 21:13

## 2021-08-06 NOTE — ED ADULT NURSE REASSESSMENT NOTE - NS ED NURSE REASSESS COMMENT FT1
called MD Kel Arboleda and left a voicemail  regarding elevated trop, also called admit hospitalist Alexander regarding elevated trop, he reports no interventions necessary at this time

## 2021-08-06 NOTE — ED PROVIDER NOTE - PHYSICAL EXAMINATION
VITALS: Initial triage and subsequent vitals have been reviewed by me.  GEN APPEARANCE: WDWN, alert and cooperative, non-toxic appearing and in NAD  HEAD: Atraumatic, normocephalic   EYES: PERRLa, EOMI, vision grossly intact.   EARS: Gross hearing intact.   NOSE: No nasal discharge, no external evidence of epistaxis.   THROAT: MMM. Oral cavity and pharynx normal. No inflammation, no swelling, no exudate, no oral lesions.  NECK: Supple  CV: RRR, S1S2, no c/r/m/g. No cyanosis or pallor. Extremities warm, well perfused. Cap refill <2 seconds. No bruits.   LUNGS: CTAB. No wheezing. No rales. No rhonchi. No diminished breath sounds.   ABDOMEN: Soft, NTND. No guarding or rebound. No masses.   MSK/EXT: Spine appears normal, no spine point tenderness. No CVA ttp. Normal muscular development. Pelvis stable. No obvious joint or bony deformity, no peripheral edema.   NEURO: Alert, follows commands. Weight bearing normal. Speech normal. Sensation and motor normal x4 extremities.   SKIN: Normal color for race, warm, dry and intact. No evidence of rash.  PSYCH: Normal mood and affect. VITALS: Initial triage and subsequent vitals have been reviewed by me.  GEN APPEARANCE: WDWN, alert and cooperative, non-toxic appearing and in NAD  HEAD: Atraumatic, normocephalic   EYES: PERRLa, EOMI, vision grossly intact.   EARS: Gross hearing intact.   NOSE: No nasal discharge, no external evidence of epistaxis.   NECK: Supple  CV: RRR, S1S2, no c/r/m/g. No cyanosis or pallor. Extremities warm, well perfused. Cap refill <2 seconds. No bruits.   LUNGS: CTAB. No wheezing. No rales. No rhonchi. No diminished breath sounds.   ABDOMEN: Soft, NTND. No guarding or rebound. No masses.   MSK/EXT: Spine appears normal, no spine point tenderness. No CVA ttp. Normal muscular development. Pelvis stable. No obvious joint or bony deformity, no peripheral edema.   NEURO: Alert, follows commands. Weight bearing normal. Speech normal. Sensation and motor normal x4 extremities.   SKIN: Normal color for race, warm, dry and intact. No evidence of rash.  PSYCH: Normal mood and affect.

## 2021-08-06 NOTE — CONSULT NOTE ADULT - ASSESSMENT
86 yo M with above PMHx, presented to ER with appropriate ICD shock x4, ICD interrogated : Multiple VT treated with ATP/ICD shock.  Ischemic CMP(EF 30%) /VT storm s/p appropriated ICD shocks  -admit pt to CICU  - maintain K+>4.0, Mg++>2.0  - start amiodarone loading (400mg TID for 12 doses) followed by 400mg po daily  - Black box warning of Amiodarone discussed with patient. Will need out patient monitoring of LFT's, TSH, Opthalmology evaluations and Pulmonary Function Tests.  -continue coreg 6.25mg po TID  - Cariology consult ()  Plan d/w pt/family//ER provider  
87 M with ICM  with ICD, s/p episodes of VT s/p 4 shocks for VT    agree with amio load 400 Q8 for 5 gram load and then 200 mg daily  keep k + > 4 and mg > 2   recommend tele monitoring  can recheck 2 d echocardiogram    CAD- continue with aspirin- patient is intolerance of statin , consider addition of zetia or nexletol  Recent nuclear stress  (april 2021) revealed LAD infarct with small arnol-infarct ischemia  - EF 27 %    on BB with coreg-  use of ACE/ARB / ENtresto- limited by symptomatic hypotension       will follow

## 2021-08-06 NOTE — H&P ADULT - NSHPLABSRESULTS_GEN_ALL_CORE
11.9   10.82 )-----------( 185      ( 06 Aug 2021 14:31 )             36.3     08-06    142  |  108  |  25<H>  ----------------------------<  90  3.4<L>   |  30  |  0.99    Ca    8.7      06 Aug 2021 14:31  Phos  3.1     08-06  Mg     1.9     08-06    TPro  6.5  /  Alb  3.3  /  TBili  0.6  /  DBili  x   /  AST  23  /  ALT  23  /  AlkPhos  123<H>  08-06    CAPILLARY BLOOD GLUCOSE

## 2021-08-06 NOTE — H&P ADULT - HISTORY OF PRESENT ILLNESS
88 y/o male with PMHX of HTN, HLD, CAD, prostate cancer, ischemic cardiomyopathy with EF of 25% s/p ICD, hx of ventricular tachycardia who presented to  today after ICD fires x4 at home.  Patient was previously noted to have VT and was started on mexiletine in June 2021, however pt developed severe muscle pain then stopped mexiletine about 6 weeks ago.  Pt is physically active, noticed to have recent intermittent fast HR with palpitations, felt ICD shock x4 today while laying down in the bed.  Presented to the ER for evaluation.  Denies syncope, SOB, or chest pain.     In the ER, ICD interrogated in ED: multiple monomorphic -180's bpm few treated with ATP in July (see full ICD interrogation report).  VT started today 11:26 AM, multiple ATP followed by ICD shock x4.        PAST MEDICAL & SURGICAL HISTORY:  Hypertension  H/O ventricular tachycardia  PPM placed 2008  Myocardial infarction  54 years old  Prostate cancer  Implantable cardioverter-defibrillator (ICD) in situ  2008  History of tonsillectomy  as child  Elective surgery  right middle finger surgery, no hardware  H/O pilonidal cyst  H/O prostate biopsy  cancer treated with prostate seeding      FAMILY HISTORY:  FHx: breast cancer  mother  FHx: myocardial infarction  father      Social History:    Lives at home.    No tob /etoh / drug use.        Allergies:  dexamethasone (Hives)  statins (Rash)

## 2021-08-06 NOTE — ED ADULT TRIAGE NOTE - CHIEF COMPLAINT QUOTE
PT to ED AFTER PACEMAKER/DEFIBRILLATOR FIRED X4. PT STATES FEELING PALPITATIONS AND "HEART RACING" PRIOR TO PACEMAKER FIRING. PT A&OX4, DENIES PAIN, CHEST PAIN, AND PALPITATIONS AT THIS TIME. VSS

## 2021-08-06 NOTE — ED PROVIDER NOTE - OBJECTIVE STATEMENT
88 y/o male with a PMHx of HTN, old MI, Prostate CA, PPM placed in 2008, V-tach, presents to the ED c/o firing pacemaker. Pt reports his Medtronic pacemaker fired x4 within 5-10 minutes while he was resting, describes it as if someone hit him in the chest w/ a baseball bat. Afterward, he felt palpitations and began w/ a cold sweat. Pt took ASA PTA and the sx have since resolved. Pt notes he has been on Prednisone and has 3 tablets left to take. Denies n/v/f/c/sob. Denies headache, syncope, lightheadedness, dizziness. Denies abdominal pain. Denies edema. Denies dysuria, hematuria, BRBPR, tarry stools, diarrhea, constipation. Denies discharge. Cardiologist: Dr. Almanzar. Never smoker. 88 y/o male with a PMHx of HTN, old MI, Prostate CA, PPM placed in 2008, V-tach, presents to the ED c/o firing pacemaker. Pt reports his Medtronic pacemaker fired x4 within 5-10 minutes while he was resting, describes it as if someone hit him in the chest w/ a baseball bat. Afterward, he felt palpitations and began w/ a cold sweat. Pt took ASA PTA and the sx have since resolved. Pt notes he has been on Prednisone and has 3 tablets left to take. Denies n/v/f/c/sob. Denies headache, syncope, lightheadedness, dizziness. Denies abdominal pain. Denies edema. Denies dysuria, hematuria, BRBPR, tarry stools, diarrhea, constipation.  Cardiologist: Dr. Almanzar. Never smoker.

## 2021-08-06 NOTE — CONSULT NOTE ADULT - SUBJECTIVE AND OBJECTIVE BOX
CHIEF COMPLAINT: Patient is a 87y old  Male who presents with a chief complaint of ICD fires (06 Aug 2021 16:58)      HPI:  88 y/o male with PMHX of HTN, HLD, CAD, prostate cancer, ischemic cardiomyopathy with EF of 25% s/p ICD, hx of ventricular tachycardia who presented to  today after ICD fires x4 at home.  Patient was previously noted to have VT and was started on mexiletine in June 2021, however pt developed severe muscle pain then stopped mexiletine about 6 weeks ago.  Pt is physically active, noticed to have recent intermittent fast HR with palpitations, felt ICD shock x4 today while laying down in the bed.  Presented to the ER for evaluation.  Denies syncope, SOB, or chest pain.     In the ER, ICD interrogated in ED: multiple monomorphic -180's bpm few treated with ATP in July (see full ICD interrogation report).  VT started today 11:26 AM, multiple ATP followed by ICD shock x4.        PAST MEDICAL & SURGICAL HISTORY:  Hypertension  H/O ventricular tachycardia  PPM placed 2008  Myocardial infarction  54 years old  Prostate cancer  Implantable cardioverter-defibrillator (ICD) in situ  2008  History of tonsillectomy  as child  Elective surgery  right middle finger surgery, no hardware  H/O pilonidal cyst  H/O prostate biopsy  cancer treated with prostate seeding      FAMILY HISTORY:  FHx: breast cancer  mother  FHx: myocardial infarction  father      Social History:    Lives at home.    No tob /etoh / drug use.        Allergies:  dexamethasone (Hives)  statins (Rash) (06 Aug 2021 16:58)      PMHx: PAST MEDICAL & SURGICAL HISTORY:  Hypertension  H/O ventricular tachycardia ICD placed 2008  Myocardial infarction 54 years old  Prostate cancer  History of tonsillectomy as child  H/O prostate biopsy  cancer treated with prostate seeding          Soc Hx: no toxic habits, lives with wife      Allergies: Allergies    dexamethasone (Hives)  statins (Rash)    Intolerances          REVIEW OF SYSTEMS:    CONSTITUTIONAL: No weakness, fevers or chills  EYES/ENT: No visual changes;  No vertigo or throat pain   NECK: No pain or stiffness  RESPIRATORY: No cough, wheezing, hemoptysis; No shortness of breath  CARDIOVASCULAR: No chest pain or palpitations  GASTROINTESTINAL: No abdominal or epigastric pain. No nausea, vomiting, or hematemesis; No diarrhea or constipation. No melena or hematochezia.  GENITOURINARY: No dysuria, frequency or hematuria  NEUROLOGICAL: No numbness or weakness  SKIN: No itching, burning, rashes, or lesions   All other review of systems is negative unless indicated above    Vital Signs Last 24 Hrs  T(C): 36.6 (06 Aug 2021 14:50), Max: 36.6 (06 Aug 2021 14:50)  T(F): 97.8 (06 Aug 2021 14:50), Max: 97.8 (06 Aug 2021 14:50)  HR: 66 (06 Aug 2021 17:19) (66 - 83)  BP: 98/55 (06 Aug 2021 17:19) (90/63 - 106/73)  BP(mean): 69 (06 Aug 2021 17:19) (69 - 71)  RR: 19 (06 Aug 2021 17:19) (18 - 20)  SpO2: 97% (06 Aug 2021 17:19) (96% - 97%)    I&O's Summary          PHYSICAL EXAM:   Constitutional: NAD, awake and alert, well-developed  HEENT: PERR, EOMI, Normal Hearing, MMM  Neck: Soft and supple, No LAD, No JVD  Respiratory: Breath sounds are clear bilaterally, No wheezing, rales or rhonchi  Cardiovascular: S1 and S2, regular rate and rhythm, no Murmurs, gallops or rubs  Gastrointestinal: Bowel Sounds present, soft, nontender, nondistended, no guarding, no rebound  Extremities: No peripheral edema  Vascular: 2+ peripheral pulses  Neurological: A/O x 3, no focal deficits  Musculoskeletal: 5/5 strength b/l upper and lower extremities  Skin: No rashes    MEDICATIONS:  MEDICATIONS  (STANDING):  allopurinol 100 milliGRAM(s) Oral two times a day  aMIOdarone    Tablet 400 milliGRAM(s) Oral every 8 hours  aMIOdarone    Tablet   Oral   aspirin 325 milliGRAM(s) Oral daily  carvedilol 6.25 milliGRAM(s) Oral <User Schedule>  enoxaparin Injectable 40 milliGRAM(s) SubCutaneous daily      LABS: All Labs Reviewed:                        11.9   10.82 )-----------( 185      ( 06 Aug 2021 14:31 )             36.3     08-06    142  |  108  |  25<H>  ----------------------------<  90  3.4<L>   |  30  |  0.99    Ca    8.7      06 Aug 2021 14:31  Phos  3.1     08-06  Mg     1.9     08-06    TPro  6.5  /  Alb  3.3  /  TBili  0.6  /  DBili  x   /  AST  23  /  ALT  23  /  AlkPhos  123<H>  08-06      CARDIAC MARKERS ( 06 Aug 2021 14:31 )  <0.015 ng/mL / x     / x     / x     / x          Serum Pro-Brain Natriuretic Peptide: 5160 pg/mL (08-06 @ 14:31)      EKG: AV dual pacemaker     Telemetry: paced     ECHO: < from: TTE Echo Complete w/o Contrast w/ Doppler (12.17.20 @ 14:52) >     Summary     Moderate (2+) mitral regurgitation is present.   Mild mitral annular calcification is present.   Mild aortic sclerosis is present with normal valvular opening.   Tracepulmonic valvular regurgitation is present.   The left atrium is mildly dilated.   The left ventricle cavity is moderately dilated.   Estimated left ventricular ejection fraction is 20-25 %.   A device wire is seen in the RV and RA.   Normal appearing right ventricle structure and function.   No evidence of pericardial effusion.     Signature     ----------------------------------------------------------------   Electronically signed by Linda Blancas MD(Interpreting   physician) on 12/18/2020 09:15 AM   ----------------------------------------------------------------    < end of copied text >      
HPI: 87 y.o M with h/o HTN, prostate ca, CAD, MI, ischemic CMP (EF 30%)  s/p ICD for initially primary prevention in 2008, required subsequent RV lead revision in Dec 2020 d/t lead fracture. Pt noted to have frequent VT with ATP, started on mexiletine in June 2021, however pt developed severe muscle pain then stopped mexiletine about 6 weeks ago. Pt is physically active, noticed to have recent intermittent fast HR with palpitations, felt ICD shock x4 today while laying down in the bed. Denies syncope, SOB, or chest pain.   Pt had stress test by  few months ago was told 'negative'    ICD interrogated in ED: multiple monomorphic -180's bpm few treated with ATP in July (see full ICD interrogation report)   VT started today 11:26 AM, multiple ATP followed by ICD shock x4  Pt is laying down in the stretcher in ER, alert, denies chest pain/SOB/palpitations.  Tele SR A-V paced  EKG: SR 68ppm, A-V paced,  QRS 222ms, QT 508ms, QTC 540ms      PAST MEDICAL & SURGICAL HISTORY:  Hypertension    H/O ventricular tachycardia  ICD placed 2008    Myocardial infarction      Prostate cancer    Implantable cardioverter-defibrillator (ICD) in situ  2008    History of tonsillectomy  as child    Elective surgery  right middle finger surgery, no hardware    H/O pilonidal cyst    H/O prostate biopsy  cancer treated with prostate seeding        FAMILY HISTORY:  FHx: breast cancer  mother    FHx: myocardial infarction  father        SOCIAL HISTORY: denies smoking  Allergies    dexamethasone (Hives)  statins (Rash)          MEDICATIONS  (STANDING):  aMIOdarone    Tablet 400 milliGRAM(s) Oral once  potassium chloride    Tablet ER 40 milliEquivalent(s) Oral once      ROS: All other ROS is negative unless indicated above.      Physical Exam:  Vital Signs Last 24 Hrs  T(C): 36.6 (06 Aug 2021 14:50), Max: 36.6 (06 Aug 2021 14:50)  T(F): 97.8 (06 Aug 2021 14:50), Max: 97.8 (06 Aug 2021 14:50)  HR: 73 (06 Aug 2021 14:50) (73 - 83)  BP: 90/63 (06 Aug 2021 14:50) (90/63 - 106/73)  BP(mean): 71 (06 Aug 2021 14:50) (71 - 71)  RR: 20 (06 Aug 2021 14:50) (18 - 20)  SpO2: 96% (06 Aug 2021 14:50) (96% - 97%)                Constitutional: well developed,  no deformities and no acute distress    Neurological: Alert & Oriented x 3, LOU, no focal deficits    HEENT: NC/AT, PERRLA, EOMI,  Neck supple.    Respiratory: CTA B/L, No wheezing/crackles/rhonchi    Cardiovascular: (+) S1 & S2, RRR, No m/r/g    Gastrointestinal: soft, NT, nondistended, (+) BS    Genitourinary: non distended bladder, voiding freely    Extremities: No pedal edema, No clubbing, No cyanosis (+)Rt hand swelling              LABS:                        11.9   10.82 )-----------( 185      ( 06 Aug 2021 14:31 )             36.3     08-06    142  |  108  |  25<H>  ----------------------------<  90  3.4<L>   |  30  |  0.99    Ca    8.7      06 Aug 2021 14:31  Phos  3.1     08-06  Mg     1.9     08-06    TPro  6.5  /  Alb  3.3  /  TBili  0.6  /  DBili  x   /  AST  23  /  ALT  23  /  AlkPhos  123<H>  08-06

## 2021-08-06 NOTE — ED ADULT NURSE REASSESSMENT NOTE - NS ED NURSE REASSESS COMMENT FT1
Report received from previous RN, patient with wife at bedside on cardiac monitor.  Labs sent awaiting results.

## 2021-08-06 NOTE — H&P ADULT - NSHPREVIEWOFSYSTEMS_GEN_ALL_CORE
ROS:  General:  No fevers, chills, or unexplained weight loss  Skin: No rash or bothersome skin lesions  Musculoskeletal: No arthalgias, myalgias or joint swelling  Eyes: No visual changes or eye pain  Ears: No hearing loss , otorrhea or ear pain  Nose, Mouth, Throat: No nasal congestion, rhinorrhea, oral lesions, postnasal drip or sore throat  Cardio: ICD fires  Respiratory: No cough, shortness of breath or wheezing   GI: No diarrhea, constipation, blood in stools, abdominal pain, vomiting or heartburn  : No urinary frequency, hematuria, incontinence, or dysuria  Neurologic: No headaches, parasthesias, confusion, dysarthria or gait instability  Psychiatric:  No anxiety or depression  Lymphatic:  No easy bruising, easy bleeding or swollen glands  Allergic: No itching, sneezing , watery eyes, clear rhinorrhea or recurrent infections

## 2021-08-06 NOTE — PROCEDURE NOTE - ADDITIONAL PROCEDURE DETAILS
Stored data revealed multiple monomorphic VT @160-180's  s/p ATP on 7/18, 20, 30.  VT started @11:26 AM on 8/6/21, treated with multiple ATP, s/p ICD shocks x 4 followed by recurrent VT treated with ATP converted to SR.

## 2021-08-06 NOTE — ED ADULT NURSE NOTE - OBJECTIVE STATEMENT
Patient states he has a defibrillator which fired 4 times this morning. Patient alert and oriented. Color good. EKG done and patient placed on monitor . Patient AV dual paced with periods of non-pacing.

## 2021-08-06 NOTE — H&P ADULT - NSHPPHYSICALEXAM_GEN_ALL_CORE
PEx  T(C): 36.6 (08-06-21 @ 14:50), Max: 36.6 (08-06-21 @ 14:50)  HR: 73 (08-06-21 @ 14:50) (73 - 83)  BP: 90/63 (08-06-21 @ 14:50) (90/63 - 106/73)  RR: 20 (08-06-21 @ 14:50) (18 - 20)  SpO2: 96% (08-06-21 @ 14:50) (96% - 97%)    General:     Well appearing, well nourished in no distress, no identifying marks , scars, or tattoos.  Skin: no rash or prominent lesions  Head: normocephalic, atraumatic     Sinuses: non-tender  Nose: no external lesions, mucosa non-inflamed, septum and turbinates normal  Throat: no erythema, exudates or lesions.  Neck: Supple without lymphadenopathy. Thyroid no thyromegaly, no palpable thyroid nodules, no palpable nodules or masses, carotid arteries no bruits.   Breasts: No palpable masses or lesions.  Heart: RRR, no murmur or gallop.  Normal S1, S2.  No S3, S4.   Lungs: CTA bilaterally, no wheezes, rhonchi, rales.  Breathing unlabored.   Chest wall: Normal insp   Abdomen:  Soft, NT/ND, normal bowel sounds, no HSM, no masses.  No peritoneal signs.   Back: spine normal without deformity or tenderness.  Normal ROM   : Exam normal.  no inguinal hernias.  Extremities: No deformities, clubbing, cyanosis, or edema.  Musculoskeletal: Normal gait and station. No decreased range of motion, instability, atrophy or abnormal strength or tone in the head, neck, spine, ribs, pelvis or extremities.   Neurologic: CN 2-12 normal. Sensation to pain, touch and proprioception normal. DTRs normal in upper and lower extremities. No pathologic reflexes.  Motor normal.  Psychiatric: Oriented X3, intact recent and remote memory, judgement and insight, normal mood and affect.

## 2021-08-06 NOTE — ED PROVIDER NOTE - CLINICAL SUMMARY MEDICAL DECISION MAKING FREE TEXT BOX
Ulises ABDI: 88 y/o male with a PMHx of HTN, old MI, Prostate CA, PPM placed in 2008, V-tach, presents to the ED c/o firing pacemaker. Pt reports his Medtronic pacemaker fired x4 within 5-10 minutes while he was resting, describes it as if someone hit him in the chest w/ a baseball bat. Afterward, he felt palpitations and began w/ a cold sweat. Pt took ASA PTA and the sx have since resolved. Pt notes he has been on Prednisone and has 3 tablets left to take exam vss non toxic PE as above ddx c/f ICD firing will check labs cardiacs discuss with EP anticipate admission.

## 2021-08-06 NOTE — ED ADULT NURSE REASSESSMENT NOTE - NS ED NURSE REASSESS COMMENT FT1
received pt from ABHISHEK Adames pt is a&0, lives at home, and ambulates generally on his own. pt v/s are stable pt reports BP baseline systolic in the 90s "because of cardiac medication" he is currently taking., v/s are stable, cardiac monitor reads sinus Paced rhythm and a HR of 68bpm, administered pts 10pm meds and second trop sent to lab at 9pm next one due at 11pm at night,

## 2021-08-06 NOTE — H&P ADULT - ASSESSMENT
88 y/o male with PMHX of HTN, HLD, CAD, prostate cancer, ischemic cardiomyopathy with EF of 25% s/p ICD, hx of ventricular tachycardia who presented to  today after ICD fires x4 at home.     #Ventricular Tachycardia s/p multiple ICD fires at home:    Admit to CICU.    Cardiac monitoring.    Trend trop-- r/o ischemia.    Unclear if will need further ischemic work up.    Cardio consult.    Appreciate EP consult.    Start amio load-- 400 TID x 12 doses then 400 daily.  Mag 1 g IV and repeat labs in am.    KCL 40 x1 and repeat labs in am.      #Ischemic Cardiomyopathy with EF 25%.    Last ECHO EF 20-25% in our system.    Cardio f/u.    Cont asa.    Allergy to statins.    Cont coreg-- increase dose.    Unclear why patient isn't on ARB/ACE.      #Prostate Cancer:      #DVT Proph:  Lovenox.

## 2021-08-06 NOTE — ED PROVIDER NOTE - CARE PLAN
Principal Discharge DX:	V-tach   Principal Discharge DX:	V-tach  Secondary Diagnosis:	ICD (implantable cardioverter-defibrillator) discharge

## 2021-08-07 LAB
ANION GAP SERPL CALC-SCNC: 2 MMOL/L — LOW (ref 5–17)
BUN SERPL-MCNC: 25 MG/DL — HIGH (ref 7–23)
CALCIUM SERPL-MCNC: 8.8 MG/DL — SIGNIFICANT CHANGE UP (ref 8.5–10.1)
CHLORIDE SERPL-SCNC: 107 MMOL/L — SIGNIFICANT CHANGE UP (ref 96–108)
CO2 SERPL-SCNC: 27 MMOL/L — SIGNIFICANT CHANGE UP (ref 22–31)
COVID-19 SPIKE DOMAIN AB INTERP: POSITIVE
COVID-19 SPIKE DOMAIN ANTIBODY RESULT: >250 U/ML — HIGH
CREAT SERPL-MCNC: 0.7 MG/DL — SIGNIFICANT CHANGE UP (ref 0.5–1.3)
GLUCOSE SERPL-MCNC: 78 MG/DL — SIGNIFICANT CHANGE UP (ref 70–99)
HCT VFR BLD CALC: 36.3 % — LOW (ref 39–50)
HGB BLD-MCNC: 12.1 G/DL — LOW (ref 13–17)
MAGNESIUM SERPL-MCNC: 2.1 MG/DL — SIGNIFICANT CHANGE UP (ref 1.6–2.6)
MCHC RBC-ENTMCNC: 30.3 PG — SIGNIFICANT CHANGE UP (ref 27–34)
MCHC RBC-ENTMCNC: 33.3 GM/DL — SIGNIFICANT CHANGE UP (ref 32–36)
MCV RBC AUTO: 91 FL — SIGNIFICANT CHANGE UP (ref 80–100)
PLATELET # BLD AUTO: 170 K/UL — SIGNIFICANT CHANGE UP (ref 150–400)
POTASSIUM SERPL-MCNC: 4.3 MMOL/L — SIGNIFICANT CHANGE UP (ref 3.5–5.3)
POTASSIUM SERPL-SCNC: 4.3 MMOL/L — SIGNIFICANT CHANGE UP (ref 3.5–5.3)
RBC # BLD: 3.99 M/UL — LOW (ref 4.2–5.8)
RBC # FLD: 14.4 % — SIGNIFICANT CHANGE UP (ref 10.3–14.5)
SARS-COV-2 IGG+IGM SERPL QL IA: >250 U/ML — HIGH
SARS-COV-2 IGG+IGM SERPL QL IA: POSITIVE
SODIUM SERPL-SCNC: 136 MMOL/L — SIGNIFICANT CHANGE UP (ref 135–145)
TSH SERPL-MCNC: 6.26 UU/ML — HIGH (ref 0.34–4.82)
WBC # BLD: 9.28 K/UL — SIGNIFICANT CHANGE UP (ref 3.8–10.5)
WBC # FLD AUTO: 9.28 K/UL — SIGNIFICANT CHANGE UP (ref 3.8–10.5)

## 2021-08-07 PROCEDURE — 99233 SBSQ HOSP IP/OBS HIGH 50: CPT

## 2021-08-07 RX ADMIN — Medication 325 MILLIGRAM(S): at 10:06

## 2021-08-07 RX ADMIN — Medication 100 MILLIGRAM(S): at 10:06

## 2021-08-07 RX ADMIN — AMIODARONE HYDROCHLORIDE 400 MILLIGRAM(S): 400 TABLET ORAL at 21:04

## 2021-08-07 RX ADMIN — CARVEDILOL PHOSPHATE 6.25 MILLIGRAM(S): 80 CAPSULE, EXTENDED RELEASE ORAL at 06:23

## 2021-08-07 RX ADMIN — AMIODARONE HYDROCHLORIDE 400 MILLIGRAM(S): 400 TABLET ORAL at 06:23

## 2021-08-07 RX ADMIN — CARVEDILOL PHOSPHATE 6.25 MILLIGRAM(S): 80 CAPSULE, EXTENDED RELEASE ORAL at 15:40

## 2021-08-07 RX ADMIN — Medication 100 MILLIGRAM(S): at 21:04

## 2021-08-07 RX ADMIN — AMIODARONE HYDROCHLORIDE 400 MILLIGRAM(S): 400 TABLET ORAL at 15:36

## 2021-08-07 RX ADMIN — ENOXAPARIN SODIUM 40 MILLIGRAM(S): 100 INJECTION SUBCUTANEOUS at 10:06

## 2021-08-07 RX ADMIN — CARVEDILOL PHOSPHATE 6.25 MILLIGRAM(S): 80 CAPSULE, EXTENDED RELEASE ORAL at 21:04
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
## 2021-08-08 ENCOUNTER — TRANSCRIPTION ENCOUNTER (OUTPATIENT)
Age: 86
End: 2021-08-08

## 2021-08-08 VITALS — HEART RATE: 64 BPM | SYSTOLIC BLOOD PRESSURE: 98 MMHG | TEMPERATURE: 97 F | DIASTOLIC BLOOD PRESSURE: 65 MMHG

## 2021-08-08 PROCEDURE — 99239 HOSP IP/OBS DSCHRG MGMT >30: CPT

## 2021-08-08 PROCEDURE — 99232 SBSQ HOSP IP/OBS MODERATE 35: CPT

## 2021-08-08 RX ORDER — AMIODARONE HYDROCHLORIDE 400 MG/1
1 TABLET ORAL
Qty: 5 | Refills: 0
Start: 2021-08-08 | End: 2021-08-09

## 2021-08-08 RX ORDER — ACETAMINOPHEN 500 MG
2 TABLET ORAL
Qty: 0 | Refills: 0 | DISCHARGE
Start: 2021-08-08

## 2021-08-08 RX ORDER — CX-024414 0.2 MG/ML
0.5 INJECTION, SUSPENSION INTRAMUSCULAR
Qty: 0 | Refills: 0 | DISCHARGE

## 2021-08-08 RX ADMIN — Medication 325 MILLIGRAM(S): at 10:16

## 2021-08-08 RX ADMIN — Medication 100 MILLIGRAM(S): at 10:16

## 2021-08-08 RX ADMIN — AMIODARONE HYDROCHLORIDE 400 MILLIGRAM(S): 400 TABLET ORAL at 06:30

## 2021-08-08 RX ADMIN — CARVEDILOL PHOSPHATE 6.25 MILLIGRAM(S): 80 CAPSULE, EXTENDED RELEASE ORAL at 06:30

## 2021-08-08 RX ADMIN — ENOXAPARIN SODIUM 40 MILLIGRAM(S): 100 INJECTION SUBCUTANEOUS at 10:14

## 2021-08-08 NOTE — DISCHARGE NOTE PROVIDER - CARE PROVIDER_API CALL
Aris Jackson (MD)  Cardiac Electrophysiology; Cardiovascular Disease  [address] [place] [address]  Phone: [phone]  Fax: [phone]  Follow Up Time:     Boy Waldron  CARDIOVASCULAR DISEASE  [address] [facility], [address]  [place] [address]  Phone: [phone]  Fax: [phone]  Follow Up Time:
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
## 2021-08-08 NOTE — DISCHARGE NOTE PROVIDER - NSDCFUSCHEDAPPT_GEN_ALL_CORE_FT
RODO SYLVESTER ; 09/20/2021 ; [facility] [address] ; 09/20/2021 ; [facility] [address]
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
## 2021-08-08 NOTE — PROGRESS NOTE ADULT - ASSESSMENT
87 M with ICM  with ICD, s/p episodes of VT s/p 4 shocks for VT    Plan  amio load 400 Q8 for 5 gram load and then 200 mg daily  keep k + > 4 and mg > 2    CAD- continue with aspirin- patient is intolerance of statin , consider addition of zetia or nexletol, - can be done as outpatient if not on formulary  Recent nuclear stress  (april 2021) revealed LAD infarct with small arnol-infarct ischemia  - EF 27 %    on BB with coreg-  use of ACE/ARB / Entresto limited by symptomatic hypotension    -Patient has been stable for over 24 hours without any significant ectopy and feeling very well, no sign of CHF.  -Had mildly elevated TSH, will monitor as outpatient while on Amio.  -Plan to D/C home with close follow-up and continue Amio load/dosing as above.
87 M with ICM  with ICD, s/p episodes of VT s/p 4 shocks for VT    Plan  amio load 400 Q8 for 5 gram load and then 200 mg daily  keep k + > 4 and mg > 2  C/W tele monitoring  can recheck 2 d echocardiogram - ordered and pending.    CAD- continue with aspirin- patient is intolerance of statin , consider addition of zetia or nexletol, - can be done as outpatient if not on formulary  Recent nuclear stress  (april 2021) revealed LAD infarct with small arnol-infarct ischemia  - EF 27 %    on BB with coreg-  use of ACE/ARB / Entresto limited by symptomatic hypotension      -Continue to monitor. If remains stable today will consider D/C tomorrow with close outpatient f/u.
86 y/o male with PMHX of HTN, HLD, CAD, prostate cancer, ischemic cardiomyopathy with EF of 25% s/p ICD, hx of ventricular tachycardia who presented to  today after ICD fires x4 at home    1. Recurrent VT in the setting sof chronic CMP with EF 25-27% s/p AICD firing off - started on po amiodarone loading   - monitor    - repeat TTE    2. CAD - cont current meds    3. Gout - allopurinol    4. VTE proph - LMWH    5. HTN - coreg    All previous medical records personally reviewed.  Time spent 54 min  Discussed with wife at bedside, cardiology Dr. Waldron
88 y/o male with PMHX of HTN, HLD, CAD, prostate cancer, ischemic cardiomyopathy with EF of 25% s/p ICD, hx of ventricular tachycardia who presented to  today after ICD fires x4 at home    1. Recurrent VT in the setting sof chronic CMP with EF 25-27% s/p AICD firing off - complete po amiodarone loading as outpt and continue with 400 mg daily  and f/u with EP team as outpt    2. CAD - cont current meds    3. Gout - allopurinol    4. VTE proph - LMWH    5. HTN - coreg    All previous medical records personally reviewed.  Time spent 64 min  Discussed with cardiology Dr. Waldron, EP Lili Zimmerman  [facility]

## 2021-08-08 NOTE — DISCHARGE NOTE PROVIDER - NSDCMRMEDTOKEN_GEN_ALL_CORE_FT
acetaminophen 325 mg oral tablet: 2 tab(s) orally every 6 hours, As needed, Temp greater or equal to 38.5C (101.3F), Mild Pain (1 - 3)  allopurinol 100 mg oral tablet: 1 tab(s) orally 2 times a day  aspirin 325 mg oral tablet: 1 tab(s) orally once a day  carvedilol 6.25 mg oral tablet: 1.5 tab(s) orally 2 times a day  Fish Oil 1200 mg oral capsule: 1 cap(s) orally 3 times a day  Pacerone 200 mg oral tablet: 2 tab(s) orally once a day  Pacerone 400 mg oral tablet: 1 tab(s) orally 3 times a day   PreserVision AREDS 2 oral capsule: 1 cap(s) orally once a day  Vitamin D3 1000 intl units oral tablet: 1 tab(s) orally once a day

## 2021-08-08 NOTE — DISCHARGE NOTE PROVIDER - NSDCCPCAREPLAN_GEN_ALL_CORE_FT
PRINCIPAL DISCHARGE DIAGNOSIS  Diagnosis: V-tach  Assessment and Plan of Treatment: amiodarone loading      SECONDARY DISCHARGE DIAGNOSES  Diagnosis: ICD (implantable cardioverter-defibrillator) discharge  Assessment and Plan of Treatment:

## 2021-08-08 NOTE — PROGRESS NOTE ADULT - SUBJECTIVE AND OBJECTIVE BOX
Electrophysiology Nurse Practitioner Progress note:   s/p VT storm, ICD shocks on amio loading Day#3 ( 3.6 Gms) -400 mg po Q8h & BB  observed overnight on tele-no further VT, A-V pacing    Patient feels well.  Denies chest pain, shortness of breath, dizziness or palpitations at this time.        EKG:A-V pacing underlying SR  TELE:no ectopy as above  GENERAL: appears well, denies any c/o  CV: RRR, S1 S2, no murmur, rub, clicks or gallop noted  RESP: LCTA bilaterally, no wheeze, no rhonchi or crackles noted  GI/: soft, NT/ND  NEURO: AOx4, no focal deficits  EXTREMITIES: no edema, clubbing or cyanosis noted        T(C): 35.6 (08-08-21 @ 06:09), Max: 36.1 (08-07-21 @ 16:07)  HR: 61 (08-08-21 @ 07:24) (60 - 87)  BP: 115/79 (08-08-21 @ 07:24) (93/50 - 116/71)  RR: 16 (08-07-21 @ 17:00) (16 - 26)  SpO2: 95% (08-08-21 @ 06:29) (94% - 100%)  Wt(kg): --  CBC Full  -  ( 07 Aug 2021 06:13 )  WBC Count : 9.28 K/uL  RBC Count : 3.99 M/uL  Hemoglobin : 12.1 g/dL  Hematocrit : 36.3 %  Platelet Count - Automated : 170 K/uL  Mean Cell Volume : 91.0 fl  Mean Cell Hemoglobin : 30.3 pg  Mean Cell Hemoglobin Concentration : 33.3 gm/dL  Auto Neutrophil # : x  Auto Lymphocyte # : x  Auto Monocyte # : x  Auto Eosinophil # : x  Auto Basophil # : x  Auto Neutrophil % : x  Auto Lymphocyte % : x  Auto Monocyte % : x  Auto Eosinophil % : x  Auto Basophil % : x    08-07    136  |  107  |  25<H>  ----------------------------<  78  4.3   |  27  |  0.70    Ca    8.8      07 Aug 2021 06:13  Phos  3.1     08-06  Mg     2.1     08-07    TPro  6.5  /  Alb  3.3  /  TBili  0.6  /  DBili  x   /  AST  23  /  ALT  23  /  AlkPhos  123<H>  08-06    CARDIAC MARKERS ( 06 Aug 2021 22:29 )  0.263 ng/mL / x     / x     / x     / x      CARDIAC MARKERS ( 06 Aug 2021 20:57 )  0.278 ng/mL / x     / x     / x     / x      CARDIAC MARKERS ( 06 Aug 2021 14:31 )  <0.015 ng/mL / x     / x     / x     / x              MEDICATIONS  (STANDING):  allopurinol 100 milliGRAM(s) Oral two times a day  aMIOdarone    Tablet 400 milliGRAM(s) Oral every 8 hours  aMIOdarone    Tablet   Oral   aspirin 325 milliGRAM(s) Oral daily  carvedilol 6.25 milliGRAM(s) Oral <User Schedule>  enoxaparin Injectable 40 milliGRAM(s) SubCutaneous daily    MEDICATIONS  (PRN):  acetaminophen   Tablet .. 650 milliGRAM(s) Oral every 6 hours PRN Temp greater or equal to 38.5C (101.3F), Mild Pain (1 - 3)  aluminum hydroxide/magnesium hydroxide/simethicone Suspension 30 milliLiter(s) Oral every 4 hours PRN Dyspepsia  melatonin 3 milliGRAM(s) Oral at bedtime PRN Insomnia  ondansetron Injectable 4 milliGRAM(s) IV Push every 8 hours PRN Nausea and/or Vomiting        HPI:  86 y/o male with PMHX of HTN, HLD, CAD, prostate cancer, ischemic cardiomyopathy with EF of 25% s/p ICD, hx of ventricular tachycardia who presented to  today after ICD fires x4 at home.  Patient was previously noted to have VT and was started on mexiletine in June 2021, however pt developed severe muscle pain then stopped mexiletine about 6 weeks ago.  Pt is physically active, noticed to have recent intermittent fast HR with palpitations, felt ICD shock x4 today while laying down in the bed.  Presented to the ER for evaluation.  Denies syncope, SOB, or chest pain.     In the ER, ICD interrogated in ED: multiple monomorphic -180's bpm few treated with ATP in July (see full ICD interrogation report).  VT started today 11:26 AM, multiple ATP followed by ICD shock x4.        PAST MEDICAL & SURGICAL HISTORY:  Hypertension  H/O ventricular tachycardia  PPM placed 2008  Myocardial infarction  54 years old  Prostate cancer  Implantable cardioverter-defibrillator (ICD) in situ  2008  History of tonsillectomy  as child  Elective surgery  right middle finger surgery, no hardware  H/O pilonidal cyst  H/O prostate biopsy  cancer treated with prostate seeding      FAMILY HISTORY:  FHx: breast cancer  mother  FHx: myocardial infarction  father      Social History:    Lives at home.    No tob /etoh / drug use.        Allergies:  dexamethasone (Hives)  statins (Rash) (06 Aug 2021 16:58)    s/p VT storm, ICD shocks on amio loading Day#3 ( 3.6 Gms) -400 mg po Q8h & BB    ASSESSMENT/PLAN:    -continue amio loading 400 mg Q8H one more day-then decrease to daily  -continue BB,   -pt will need PFTs, regular outpt monitoring of TFTs, LFTs,annual  eye exam  Education and information discussed with pt re: amiodarone -and need for aforementioned screening/ monitoring  -Plan of care discussed with patient,   -labs, EKG and procedure site assessed  -Follow-up with EP Dr Briscoe next week  -daily Follow up with Dr Herson Almanzar  within 1 week   Electrophysiology Nurse Practitioner Progress note:   s/p VT storm, ICD shocks on amio loading Day#3 ( 3.6 Gms) -400 mg po Q8h & BB  observed overnight on tele-no further VT, A-V pacing    Patient feels well.  Denies chest pain, shortness of breath, dizziness or palpitations at this time.        EKG:A-V pacing underlying SR  TELE:no ectopy as above  GENERAL: appears well, denies any c/o  CV: RRR, S1 S2, no murmur, rub, clicks or gallop noted  RESP: LCTA bilaterally, no wheeze, no rhonchi or crackles noted  GI/: soft, NT/ND  NEURO: AOx4, no focal deficits  EXTREMITIES: no edema, clubbing or cyanosis noted        T(C): 35.6 (08-08-21 @ 06:09), Max: 36.1 (08-07-21 @ 16:07)  HR: 61 (08-08-21 @ 07:24) (60 - 87)  BP: 115/79 (08-08-21 @ 07:24) (93/50 - 116/71)  RR: 16 (08-07-21 @ 17:00) (16 - 26)  SpO2: 95% (08-08-21 @ 06:29) (94% - 100%)  Wt(kg): --  CBC Full  -  ( 07 Aug 2021 06:13 )  WBC Count : 9.28 K/uL  RBC Count : 3.99 M/uL  Hemoglobin : 12.1 g/dL  Hematocrit : 36.3 %  Platelet Count - Automated : 170 K/uL  Mean Cell Volume : 91.0 fl  Mean Cell Hemoglobin : 30.3 pg  Mean Cell Hemoglobin Concentration : 33.3 gm/dL  Auto Neutrophil # : x  Auto Lymphocyte # : x  Auto Monocyte # : x  Auto Eosinophil # : x  Auto Basophil # : x  Auto Neutrophil % : x  Auto Lymphocyte % : x  Auto Monocyte % : x  Auto Eosinophil % : x  Auto Basophil % : x    08-07    136  |  107  |  25<H>  ----------------------------<  78  4.3   |  27  |  0.70    Ca    8.8      07 Aug 2021 06:13  Phos  3.1     08-06  Mg     2.1     08-07    TPro  6.5  /  Alb  3.3  /  TBili  0.6  /  DBili  x   /  AST  23  /  ALT  23  /  AlkPhos  123<H>  08-06    CARDIAC MARKERS ( 06 Aug 2021 22:29 )  0.263 ng/mL / x     / x     / x     / x      CARDIAC MARKERS ( 06 Aug 2021 20:57 )  0.278 ng/mL / x     / x     / x     / x      CARDIAC MARKERS ( 06 Aug 2021 14:31 )  <0.015 ng/mL / x     / x     / x     / x              MEDICATIONS  (STANDING):  allopurinol 100 milliGRAM(s) Oral two times a day  aMIOdarone    Tablet 400 milliGRAM(s) Oral every 8 hours  aMIOdarone    Tablet   Oral   aspirin 325 milliGRAM(s) Oral daily  carvedilol 6.25 milliGRAM(s) Oral <User Schedule>  enoxaparin Injectable 40 milliGRAM(s) SubCutaneous daily    MEDICATIONS  (PRN):  acetaminophen   Tablet .. 650 milliGRAM(s) Oral every 6 hours PRN Temp greater or equal to 38.5C (101.3F), Mild Pain (1 - 3)  aluminum hydroxide/magnesium hydroxide/simethicone Suspension 30 milliLiter(s) Oral every 4 hours PRN Dyspepsia  melatonin 3 milliGRAM(s) Oral at bedtime PRN Insomnia  ondansetron Injectable 4 milliGRAM(s) IV Push every 8 hours PRN Nausea and/or Vomiting        HPI:  86 y/o male with PMHX of HTN, HLD, CAD, prostate cancer, ischemic cardiomyopathy with EF of 25% s/p ICD, hx of ventricular tachycardia who presented to  today after ICD fires x4 at home.  Patient was previously noted to have VT and was started on mexiletine in June 2021, however pt developed severe muscle pain then stopped mexiletine about 6 weeks ago.  Pt is physically active, noticed to have recent intermittent fast HR with palpitations, felt ICD shock x4 today while laying down in the bed.  Presented to the ER for evaluation.  Denies syncope, SOB, or chest pain.     In the ER, ICD interrogated in ED: multiple monomorphic -180's bpm few treated with ATP in July (see full ICD interrogation report).  VT started today 11:26 AM, multiple ATP followed by ICD shock x4.        PAST MEDICAL & SURGICAL HISTORY:  Hypertension  H/O ventricular tachycardia  PPM placed 2008  Myocardial infarction  54 years old  Prostate cancer  Implantable cardioverter-defibrillator (ICD) in situ  2008  History of tonsillectomy  as child  Elective surgery  right middle finger surgery, no hardware  H/O pilonidal cyst  H/O prostate biopsy  cancer treated with prostate seeding      FAMILY HISTORY:  FHx: breast cancer  mother  FHx: myocardial infarction  father      Social History:    Lives at home.    No tob /etoh / drug use.        Allergies:  dexamethasone (Hives)  statins (Rash) (06 Aug 2021 16:58)    s/p VT storm, ICD shocks on amio loading Day#3 ( 3.6 Gms) -400 mg po Q8h & BB    ASSESSMENT/PLAN:    -continue amio loading 400 mg Q8H one more day-then decrease to 400 mg daily  -continue BB,   -pt will need PFTs, regular outpt monitoring of TFTs, LFTs,annual  eye exam  Education and information discussed with pt re: amiodarone -and need for aforementioned screening/ monitoring  -Plan of care discussed with patient,   -labs, EKG and procedure site assessed  -Follow-up with EP Dr Briscoe next week  -daily Follow up with Dr Herson Almanzar  within 1 week   Electrophysiology Nurse Practitioner Progress note:   s/p VT storm, ICD shocks on amio loading Day#3 ( 3.6 Gms) -400 mg po Q8h & BB  observed overnight on tele-no further VT, A-V pacing    Patient feels well.  Denies chest pain, shortness of breath, dizziness or palpitations at this time.        EKG:A-V pacing underlying SR  TELE:no ectopy as above  GENERAL: appears well, denies any c/o  CV: RRR, S1 S2, no murmur, rub, clicks or gallop noted  RESP: LCTA bilaterally, no wheeze, no rhonchi or crackles noted  GI/: soft, NT/ND  NEURO: AOx4, no focal deficits  EXTREMITIES: no edema, clubbing or cyanosis noted        T(C): 35.6 (08-08-21 @ 06:09), Max: 36.1 (08-07-21 @ 16:07)  HR: 61 (08-08-21 @ 07:24) (60 - 87)  BP: 115/79 (08-08-21 @ 07:24) (93/50 - 116/71)  RR: 16 (08-07-21 @ 17:00) (16 - 26)  SpO2: 95% (08-08-21 @ 06:29) (94% - 100%)  Wt(kg): --  CBC Full  -  ( 07 Aug 2021 06:13 )  WBC Count : 9.28 K/uL  RBC Count : 3.99 M/uL  Hemoglobin : 12.1 g/dL  Hematocrit : 36.3 %  Platelet Count - Automated : 170 K/uL  Mean Cell Volume : 91.0 fl  Mean Cell Hemoglobin : 30.3 pg  Mean Cell Hemoglobin Concentration : 33.3 gm/dL  Auto Neutrophil # : x  Auto Lymphocyte # : x  Auto Monocyte # : x  Auto Eosinophil # : x  Auto Basophil # : x  Auto Neutrophil % : x  Auto Lymphocyte % : x  Auto Monocyte % : x  Auto Eosinophil % : x  Auto Basophil % : x    08-07    136  |  107  |  25<H>  ----------------------------<  78  4.3   |  27  |  0.70    Ca    8.8      07 Aug 2021 06:13  Phos  3.1     08-06  Mg     2.1     08-07    TPro  6.5  /  Alb  3.3  /  TBili  0.6  /  DBili  x   /  AST  23  /  ALT  23  /  AlkPhos  123<H>  08-06    CARDIAC MARKERS ( 06 Aug 2021 22:29 )  0.263 ng/mL / x     / x     / x     / x      CARDIAC MARKERS ( 06 Aug 2021 20:57 )  0.278 ng/mL / x     / x     / x     / x      CARDIAC MARKERS ( 06 Aug 2021 14:31 )  <0.015 ng/mL / x     / x     / x     / x              MEDICATIONS  (STANDING):  allopurinol 100 milliGRAM(s) Oral two times a day  aMIOdarone    Tablet 400 milliGRAM(s) Oral every 8 hours  aMIOdarone    Tablet   Oral   aspirin 325 milliGRAM(s) Oral daily  carvedilol 6.25 milliGRAM(s) Oral <User Schedule>  enoxaparin Injectable 40 milliGRAM(s) SubCutaneous daily    MEDICATIONS  (PRN):  acetaminophen   Tablet .. 650 milliGRAM(s) Oral every 6 hours PRN Temp greater or equal to 38.5C (101.3F), Mild Pain (1 - 3)  aluminum hydroxide/magnesium hydroxide/simethicone Suspension 30 milliLiter(s) Oral every 4 hours PRN Dyspepsia  melatonin 3 milliGRAM(s) Oral at bedtime PRN Insomnia  ondansetron Injectable 4 milliGRAM(s) IV Push every 8 hours PRN Nausea and/or Vomiting        HPI:  86 y/o male with PMHX of HTN, HLD, CAD, prostate cancer, ischemic cardiomyopathy with EF of 25% s/p ICD, hx of ventricular tachycardia who presented to  today after ICD fires x4 at home.  Patient was previously noted to have VT and was started on mexiletine in June 2021, however pt developed severe muscle pain then stopped mexiletine about 6 weeks ago.  Pt is physically active, noticed to have recent intermittent fast HR with palpitations, felt ICD shock x4 today while laying down in the bed.  Presented to the ER for evaluation.  Denies syncope, SOB, or chest pain.     In the ER, ICD interrogated in ED: multiple monomorphic -180's bpm few treated with ATP in July (see full ICD interrogation report).  VT started today 11:26 AM, multiple ATP followed by ICD shock x4.        PAST MEDICAL & SURGICAL HISTORY:  Hypertension  H/O ventricular tachycardia  PPM placed 2008  Myocardial infarction  54 years old  Prostate cancer  Implantable cardioverter-defibrillator (ICD) in situ  2008  History of tonsillectomy  as child  Elective surgery  right middle finger surgery, no hardware  H/O pilonidal cyst  H/O prostate biopsy  cancer treated with prostate seeding      FAMILY HISTORY:  FHx: breast cancer  mother  FHx: myocardial infarction  father      Social History:    Lives at home.    No tob /etoh / drug use.        Allergies:  dexamethasone (Hives)  statins (Rash) (06 Aug 2021 16:58)    s/p VT storm, ICD shocks on amio loading Day#3 ( 3.6 Gms) -400 mg po Q8h & BB    ASSESSMENT/PLAN:    -continue amio loading 400 mg Q8H one more day-then decrease to 400 mg daily (with food).  -continue BB,   -pt will need PFTs, regular outpt monitoring of TFTs, LFTs,annual  eye exam  Education and information discussed with pt re: amiodarone -and need for aforementioned screening/ monitoring  -Plan of care discussed with patient,   -labs, EKG and procedure site assessed  -Follow-up with EP Dr Briscoe next week  -daily Follow up with Dr Herson Almanzar  within 1 week   Electrophysiology Nurse Practitioner Progress note:   s/p VT storm, ICD shocks on amio loading Day#3 ( 3.6 Gms) -400 mg po Q8h & BB  observed overnight on tele-no further VT, A-V pacing    Patient feels well.  Denies chest pain, shortness of breath, dizziness or palpitations at this time.        EKG:A-V pacing underlying SR  TELE:no ectopy as above  GENERAL: appears well, denies any c/o  CV: RRR, S1 S2, no murmur, rub, clicks or gallop noted  RESP: LCTA bilaterally, no wheeze, no rhonchi or crackles noted  GI/: soft, NT/ND  NEURO: AOx4, no focal deficits  EXTREMITIES: no edema, clubbing or cyanosis noted        T(C): 35.6 (08-08-21 @ 06:09), Max: 36.1 (08-07-21 @ 16:07)  HR: 61 (08-08-21 @ 07:24) (60 - 87)  BP: 115/79 (08-08-21 @ 07:24) (93/50 - 116/71)  RR: 16 (08-07-21 @ 17:00) (16 - 26)  SpO2: 95% (08-08-21 @ 06:29) (94% - 100%)  Wt(kg): --  CBC Full  -  ( 07 Aug 2021 06:13 )  WBC Count : 9.28 K/uL  RBC Count : 3.99 M/uL  Hemoglobin : 12.1 g/dL  Hematocrit : 36.3 %  Platelet Count - Automated : 170 K/uL  Mean Cell Volume : 91.0 fl  Mean Cell Hemoglobin : 30.3 pg  Mean Cell Hemoglobin Concentration : 33.3 gm/dL  Auto Neutrophil # : x  Auto Lymphocyte # : x  Auto Monocyte # : x  Auto Eosinophil # : x  Auto Basophil # : x  Auto Neutrophil % : x  Auto Lymphocyte % : x  Auto Monocyte % : x  Auto Eosinophil % : x  Auto Basophil % : x    08-07    136  |  107  |  25<H>  ----------------------------<  78  4.3   |  27  |  0.70    Ca    8.8      07 Aug 2021 06:13  Phos  3.1     08-06  Mg     2.1     08-07    TPro  6.5  /  Alb  3.3  /  TBili  0.6  /  DBili  x   /  AST  23  /  ALT  23  /  AlkPhos  123<H>  08-06    CARDIAC MARKERS ( 06 Aug 2021 22:29 )  0.263 ng/mL / x     / x     / x     / x      CARDIAC MARKERS ( 06 Aug 2021 20:57 )  0.278 ng/mL / x     / x     / x     / x      CARDIAC MARKERS ( 06 Aug 2021 14:31 )  <0.015 ng/mL / x     / x     / x     / x              MEDICATIONS  (STANDING):  allopurinol 100 milliGRAM(s) Oral two times a day  aMIOdarone    Tablet 400 milliGRAM(s) Oral every 8 hours  aMIOdarone    Tablet   Oral   aspirin 325 milliGRAM(s) Oral daily  carvedilol 6.25 milliGRAM(s) Oral <User Schedule>  enoxaparin Injectable 40 milliGRAM(s) SubCutaneous daily    MEDICATIONS  (PRN):  acetaminophen   Tablet .. 650 milliGRAM(s) Oral every 6 hours PRN Temp greater or equal to 38.5C (101.3F), Mild Pain (1 - 3)  aluminum hydroxide/magnesium hydroxide/simethicone Suspension 30 milliLiter(s) Oral every 4 hours PRN Dyspepsia  melatonin 3 milliGRAM(s) Oral at bedtime PRN Insomnia  ondansetron Injectable 4 milliGRAM(s) IV Push every 8 hours PRN Nausea and/or Vomiting        HPI:  88 y/o male with PMHX of HTN, HLD, CAD, prostate cancer, ischemic cardiomyopathy with EF of 25% s/p ICD, hx of ventricular tachycardia who presented to  today after ICD fires x4 at home.  Patient was previously noted to have VT and was started on mexiletine in June 2021, however pt developed severe muscle pain then stopped mexiletine about 6 weeks ago.  Pt is physically active, noticed to have recent intermittent fast HR with palpitations, felt ICD shock x4 today while laying down in the bed.  Presented to the ER for evaluation.  Denies syncope, SOB, or chest pain.     In the ER, ICD interrogated in ED: multiple monomorphic -180's bpm few treated with ATP in July (see full ICD interrogation report).  VT started today 11:26 AM, multiple ATP followed by ICD shock x4.        PAST MEDICAL & SURGICAL HISTORY:  Hypertension  H/O ventricular tachycardia  PPM placed 2008  Myocardial infarction  54 years old  Prostate cancer  Implantable cardioverter-defibrillator (ICD) in situ  2008  History of tonsillectomy  as child  Elective surgery  right middle finger surgery, no hardware  H/O pilonidal cyst  H/O prostate biopsy  cancer treated with prostate seeding      FAMILY HISTORY:  FHx: breast cancer  mother  FHx: myocardial infarction  father      Social History:    Lives at home.    No tob /etoh / drug use.        Allergies:  dexamethasone (Hives)  statins (Rash) (06 Aug 2021 16:58)    s/p VT storm, ICD shocks on amio loading Day#3 ( 3.6 Gms) -400 mg po Q8h & BB    ASSESSMENT/PLAN:    -continue amio loading 400 mg Q8H one more day-then decrease to 400 mg daily (with food).  -continue BB,   -pt will need PFTs, regular outpt monitoring of TFTs, LFTs,annual  eye exam  Education and information discussed with pt re: amiodarone -and need for aforementioned screening/ monitoring  -Plan of care discussed with patient,   -labs, EKG and procedure site assessed  -Follow-up with EP Dr Jackson next week  - Follow up with Dr Herson Almanzar  within 1 week

## 2021-08-10 RX ORDER — AMIODARONE HYDROCHLORIDE 400 MG/1
1 TABLET ORAL
Qty: 0 | Refills: 0 | DISCHARGE
Start: 2021-08-10

## 2021-08-10 RX ORDER — AMIODARONE HYDROCHLORIDE 400 MG/1
2 TABLET ORAL
Qty: 20 | Refills: 0
Start: 2021-08-10 | End: 2021-08-19

## 2021-08-15 DIAGNOSIS — Z85.46 PERSONAL HISTORY OF MALIGNANT NEOPLASM OF PROSTATE: ICD-10-CM

## 2021-08-15 DIAGNOSIS — I95.9 HYPOTENSION, UNSPECIFIED: ICD-10-CM

## 2021-08-15 DIAGNOSIS — Z95.810 PRESENCE OF AUTOMATIC (IMPLANTABLE) CARDIAC DEFIBRILLATOR: ICD-10-CM

## 2021-08-15 DIAGNOSIS — I10 ESSENTIAL (PRIMARY) HYPERTENSION: ICD-10-CM

## 2021-08-15 DIAGNOSIS — E78.5 HYPERLIPIDEMIA, UNSPECIFIED: ICD-10-CM

## 2021-08-15 DIAGNOSIS — I25.5 ISCHEMIC CARDIOMYOPATHY: ICD-10-CM

## 2021-08-15 DIAGNOSIS — Z88.8 ALLERGY STATUS TO OTHER DRUGS, MEDICAMENTS AND BIOLOGICAL SUBSTANCES: ICD-10-CM

## 2021-08-15 DIAGNOSIS — M10.9 GOUT, UNSPECIFIED: ICD-10-CM

## 2021-08-15 DIAGNOSIS — I25.10 ATHEROSCLEROTIC HEART DISEASE OF NATIVE CORONARY ARTERY WITHOUT ANGINA PECTORIS: ICD-10-CM

## 2021-08-15 DIAGNOSIS — I25.2 OLD MYOCARDIAL INFARCTION: ICD-10-CM

## 2021-08-15 DIAGNOSIS — I47.2 VENTRICULAR TACHYCARDIA: ICD-10-CM

## 2021-08-20 DIAGNOSIS — Z83.3 FAMILY HISTORY OF DIABETES MELLITUS: ICD-10-CM

## 2021-08-20 DIAGNOSIS — Z78.9 OTHER SPECIFIED HEALTH STATUS: ICD-10-CM

## 2021-08-20 RX ORDER — MEXILETINE HYDROCHLORIDE 200 MG/1
200 CAPSULE ORAL TWICE DAILY
Qty: 180 | Refills: 1 | Status: DISCONTINUED | COMMUNITY
End: 2021-08-20

## 2021-08-23 ENCOUNTER — APPOINTMENT (OUTPATIENT)
Dept: ELECTROPHYSIOLOGY | Facility: CLINIC | Age: 86
End: 2021-08-23
Payer: MEDICARE

## 2021-08-23 VITALS
HEIGHT: 68 IN | HEART RATE: 68 BPM | OXYGEN SATURATION: 98 % | BODY MASS INDEX: 23.38 KG/M2 | WEIGHT: 154.25 LBS | SYSTOLIC BLOOD PRESSURE: 99 MMHG | DIASTOLIC BLOOD PRESSURE: 64 MMHG

## 2021-08-23 PROCEDURE — 93290 INTERROG DEV EVAL ICPMS IP: CPT | Mod: 26

## 2021-08-23 PROCEDURE — 99214 OFFICE O/P EST MOD 30 MIN: CPT

## 2021-08-23 PROCEDURE — 93283 PRGRMG EVAL IMPLANTABLE DFB: CPT

## 2021-09-19 ENCOUNTER — APPOINTMENT (OUTPATIENT)
Dept: ELECTROPHYSIOLOGY | Facility: CLINIC | Age: 86
End: 2021-09-19
Payer: MEDICARE

## 2021-09-20 ENCOUNTER — APPOINTMENT (OUTPATIENT)
Dept: ELECTROPHYSIOLOGY | Facility: CLINIC | Age: 86
End: 2021-09-20
Payer: MEDICARE

## 2021-09-20 ENCOUNTER — NON-APPOINTMENT (OUTPATIENT)
Age: 86
End: 2021-09-20

## 2021-09-20 PROCEDURE — 93295 DEV INTERROG REMOTE 1/2/MLT: CPT

## 2021-09-20 PROCEDURE — 93296 REM INTERROG EVL PM/IDS: CPT

## 2021-09-29 NOTE — ASSESSMENT
[FreeTextEntry1] :  Records including discharge note from 8/8/21 hospitalization reviewed. Echo from 12/17/2020 and ECG from 8/6/21 reviewed. \par \par This is an 87 year old man with ischemic dilated cardiomyopathy EF 25%, chronic systolic heart failure and recent hospitalization for VT. \par \par 1) ICD device function normal no further VT since hospitalization.  Will continue remote monitoring every 3 months.\par \par 2) VT tolerating amiodarone. Will continue amiodarone 200 mg daily. This drug with potential toxicities will need yearly TFTs,LFTs and pulmonary evaluation every 6 months. In addition yearly eye evaluation. \par \par 3) Ischemic cardiomyopathy.  Given BP of 99/64 will not be able to up titrate beta blocker. He was intolerant of ACEI and ARB due to hypotension.

## 2021-09-29 NOTE — HISTORY OF PRESENT ILLNESS
[FreeTextEntry1] : This is an 87 year old man with ischemic dilated cardiomyopathy, ICD initially for primary prevention, but he has received appropriate shocks for VT. RODO SYLVESTER  denies chest pain, chest pressure, shortness of breath, lightheadedness, dizziness, palpitations, syncope, presyncope, orthopnea, PND, or edema.  He was hospitalized 8/6/21 with multiple ICD shocks for VT. He was loaded with amiodarone.

## 2021-09-29 NOTE — CARDIOLOGY SUMMARY
[de-identified] : 12/17/2020 :  Moderate (2+) mitral regurgitation is present.\par  Mild mitral annular calcification is present.\par  Mild aortic sclerosis is present with normal valvular opening.\par  Trace pulmonic valvular regurgitation is present.\par  The left atrium is mildly dilated.\par  The left ventricle cavity is moderately dilated.\par  Estimated left ventricular ejection fraction is 20-25 %.\par  A device wire is seen in the RV and RA.\par  Normal appearing right ventricle structure and function.\par  No evidence of pericardial effusion.

## 2021-12-02 ENCOUNTER — NON-APPOINTMENT (OUTPATIENT)
Age: 86
End: 2021-12-02

## 2021-12-02 ENCOUNTER — APPOINTMENT (OUTPATIENT)
Dept: ELECTROPHYSIOLOGY | Facility: CLINIC | Age: 86
End: 2021-12-02
Payer: MEDICARE

## 2021-12-02 VITALS
WEIGHT: 146 LBS | BODY MASS INDEX: 22.13 KG/M2 | DIASTOLIC BLOOD PRESSURE: 67 MMHG | RESPIRATION RATE: 14 BRPM | HEIGHT: 68 IN | OXYGEN SATURATION: 98 % | SYSTOLIC BLOOD PRESSURE: 102 MMHG

## 2021-12-02 PROCEDURE — 93283 PRGRMG EVAL IMPLANTABLE DFB: CPT

## 2021-12-02 PROCEDURE — 93290 INTERROG DEV EVAL ICPMS IP: CPT | Mod: 26

## 2021-12-30 ENCOUNTER — EMERGENCY (EMERGENCY)
Facility: HOSPITAL | Age: 86
LOS: 0 days | Discharge: HOME | End: 2021-12-30
Attending: EMERGENCY MEDICINE
Payer: MEDICARE

## 2021-12-30 VITALS
DIASTOLIC BLOOD PRESSURE: 81 MMHG | OXYGEN SATURATION: 98 % | RESPIRATION RATE: 18 BRPM | HEART RATE: 60 BPM | SYSTOLIC BLOOD PRESSURE: 129 MMHG | TEMPERATURE: 97 F

## 2021-12-30 VITALS — WEIGHT: 154.98 LBS | HEIGHT: 68 IN

## 2021-12-30 DIAGNOSIS — Z98.890 OTHER SPECIFIED POSTPROCEDURAL STATES: Chronic | ICD-10-CM

## 2021-12-30 DIAGNOSIS — R00.2 PALPITATIONS: ICD-10-CM

## 2021-12-30 DIAGNOSIS — I25.2 OLD MYOCARDIAL INFARCTION: ICD-10-CM

## 2021-12-30 DIAGNOSIS — Z95.810 PRESENCE OF AUTOMATIC (IMPLANTABLE) CARDIAC DEFIBRILLATOR: Chronic | ICD-10-CM

## 2021-12-30 DIAGNOSIS — Z79.82 LONG TERM (CURRENT) USE OF ASPIRIN: ICD-10-CM

## 2021-12-30 DIAGNOSIS — Z87.2 PERSONAL HISTORY OF DISEASES OF THE SKIN AND SUBCUTANEOUS TISSUE: Chronic | ICD-10-CM

## 2021-12-30 DIAGNOSIS — I25.10 ATHEROSCLEROTIC HEART DISEASE OF NATIVE CORONARY ARTERY WITHOUT ANGINA PECTORIS: ICD-10-CM

## 2021-12-30 DIAGNOSIS — Z90.89 ACQUIRED ABSENCE OF OTHER ORGANS: Chronic | ICD-10-CM

## 2021-12-30 DIAGNOSIS — R07.9 CHEST PAIN, UNSPECIFIED: ICD-10-CM

## 2021-12-30 DIAGNOSIS — I10 ESSENTIAL (PRIMARY) HYPERTENSION: ICD-10-CM

## 2021-12-30 DIAGNOSIS — Z41.9 ENCOUNTER FOR PROCEDURE FOR PURPOSES OTHER THAN REMEDYING HEALTH STATE, UNSPECIFIED: Chronic | ICD-10-CM

## 2021-12-30 LAB
ALBUMIN SERPL ELPH-MCNC: 2.7 G/DL — LOW (ref 3.3–5)
ALP SERPL-CCNC: 173 U/L — HIGH (ref 40–120)
ALT FLD-CCNC: 32 U/L — SIGNIFICANT CHANGE UP (ref 12–78)
ANION GAP SERPL CALC-SCNC: 3 MMOL/L — LOW (ref 5–17)
APTT BLD: 35.4 SEC — SIGNIFICANT CHANGE UP (ref 27.5–35.5)
AST SERPL-CCNC: 26 U/L — SIGNIFICANT CHANGE UP (ref 15–37)
BASOPHILS # BLD AUTO: 0.05 K/UL — SIGNIFICANT CHANGE UP (ref 0–0.2)
BASOPHILS NFR BLD AUTO: 0.7 % — SIGNIFICANT CHANGE UP (ref 0–2)
BILIRUB SERPL-MCNC: 1.3 MG/DL — HIGH (ref 0.2–1.2)
BUN SERPL-MCNC: 26 MG/DL — HIGH (ref 7–23)
CALCIUM SERPL-MCNC: 9 MG/DL — SIGNIFICANT CHANGE UP (ref 8.5–10.1)
CHLORIDE SERPL-SCNC: 106 MMOL/L — SIGNIFICANT CHANGE UP (ref 96–108)
CO2 SERPL-SCNC: 31 MMOL/L — SIGNIFICANT CHANGE UP (ref 22–31)
CREAT SERPL-MCNC: 1.05 MG/DL — SIGNIFICANT CHANGE UP (ref 0.5–1.3)
EOSINOPHIL # BLD AUTO: 0.42 K/UL — SIGNIFICANT CHANGE UP (ref 0–0.5)
EOSINOPHIL NFR BLD AUTO: 6 % — SIGNIFICANT CHANGE UP (ref 0–6)
GLUCOSE SERPL-MCNC: 99 MG/DL — SIGNIFICANT CHANGE UP (ref 70–99)
HCT VFR BLD CALC: 37.3 % — LOW (ref 39–50)
HGB BLD-MCNC: 11.8 G/DL — LOW (ref 13–17)
IMM GRANULOCYTES NFR BLD AUTO: 0.4 % — SIGNIFICANT CHANGE UP (ref 0–1.5)
INR BLD: 1.21 RATIO — HIGH (ref 0.88–1.16)
LYMPHOCYTES # BLD AUTO: 0.75 K/UL — LOW (ref 1–3.3)
LYMPHOCYTES # BLD AUTO: 10.7 % — LOW (ref 13–44)
MAGNESIUM SERPL-MCNC: 2.1 MG/DL — SIGNIFICANT CHANGE UP (ref 1.6–2.6)
MCHC RBC-ENTMCNC: 28.1 PG — SIGNIFICANT CHANGE UP (ref 27–34)
MCHC RBC-ENTMCNC: 31.6 GM/DL — LOW (ref 32–36)
MCV RBC AUTO: 88.8 FL — SIGNIFICANT CHANGE UP (ref 80–100)
MONOCYTES # BLD AUTO: 0.75 K/UL — SIGNIFICANT CHANGE UP (ref 0–0.9)
MONOCYTES NFR BLD AUTO: 10.7 % — SIGNIFICANT CHANGE UP (ref 2–14)
NEUTROPHILS # BLD AUTO: 5.02 K/UL — SIGNIFICANT CHANGE UP (ref 1.8–7.4)
NEUTROPHILS NFR BLD AUTO: 71.5 % — SIGNIFICANT CHANGE UP (ref 43–77)
NT-PROBNP SERPL-SCNC: HIGH PG/ML (ref 0–450)
PLATELET # BLD AUTO: 242 K/UL — SIGNIFICANT CHANGE UP (ref 150–400)
POTASSIUM SERPL-MCNC: 4.3 MMOL/L — SIGNIFICANT CHANGE UP (ref 3.5–5.3)
POTASSIUM SERPL-SCNC: 4.3 MMOL/L — SIGNIFICANT CHANGE UP (ref 3.5–5.3)
PROT SERPL-MCNC: 6.5 GM/DL — SIGNIFICANT CHANGE UP (ref 6–8.3)
PROTHROM AB SERPL-ACNC: 13.9 SEC — HIGH (ref 10.6–13.6)
RBC # BLD: 4.2 M/UL — SIGNIFICANT CHANGE UP (ref 4.2–5.8)
RBC # FLD: 16.6 % — HIGH (ref 10.3–14.5)
SARS-COV-2 RNA SPEC QL NAA+PROBE: SIGNIFICANT CHANGE UP
SODIUM SERPL-SCNC: 140 MMOL/L — SIGNIFICANT CHANGE UP (ref 135–145)
TROPONIN I, HIGH SENSITIVITY RESULT: 14.57 NG/L — SIGNIFICANT CHANGE UP
TROPONIN I, HIGH SENSITIVITY RESULT: 25.96 NG/L — SIGNIFICANT CHANGE UP
WBC # BLD: 7.02 K/UL — SIGNIFICANT CHANGE UP (ref 3.8–10.5)
WBC # FLD AUTO: 7.02 K/UL — SIGNIFICANT CHANGE UP (ref 3.8–10.5)

## 2021-12-30 PROCEDURE — 85730 THROMBOPLASTIN TIME PARTIAL: CPT

## 2021-12-30 PROCEDURE — 83735 ASSAY OF MAGNESIUM: CPT

## 2021-12-30 PROCEDURE — U0003: CPT

## 2021-12-30 PROCEDURE — 71045 X-RAY EXAM CHEST 1 VIEW: CPT

## 2021-12-30 PROCEDURE — 99284 EMERGENCY DEPT VISIT MOD MDM: CPT | Mod: 25

## 2021-12-30 PROCEDURE — 93010 ELECTROCARDIOGRAM REPORT: CPT

## 2021-12-30 PROCEDURE — 36415 COLL VENOUS BLD VENIPUNCTURE: CPT

## 2021-12-30 PROCEDURE — 99285 EMERGENCY DEPT VISIT HI MDM: CPT

## 2021-12-30 PROCEDURE — 85610 PROTHROMBIN TIME: CPT

## 2021-12-30 PROCEDURE — 85025 COMPLETE CBC W/AUTO DIFF WBC: CPT

## 2021-12-30 PROCEDURE — 93005 ELECTROCARDIOGRAM TRACING: CPT

## 2021-12-30 PROCEDURE — 80053 COMPREHEN METABOLIC PANEL: CPT

## 2021-12-30 PROCEDURE — 71045 X-RAY EXAM CHEST 1 VIEW: CPT | Mod: 26

## 2021-12-30 PROCEDURE — 83880 ASSAY OF NATRIURETIC PEPTIDE: CPT

## 2021-12-30 PROCEDURE — U0005: CPT

## 2021-12-30 PROCEDURE — 84484 ASSAY OF TROPONIN QUANT: CPT | Mod: 91

## 2021-12-30 NOTE — ED PROVIDER NOTE - CLINICAL SUMMARY MEDICAL DECISION MAKING FREE TEXT BOX
Resolved episode of palpitations, asymptomatic throughout ED stay with no events on tele, troponin negative x 2, ECG without any ischemic changes. Device interrogated with no events or therapy. Offered tele obs for serial enzymes, evaluation by cardiology but since he is currently asymptomatic he would rather follow up in the office due to pandemic concerns.

## 2021-12-30 NOTE — ED ADULT NURSE REASSESSMENT NOTE - NS ED NURSE REASSESS COMMENT FT1
Rec'd report on patient from previous RN. patient resting comfortably, denies CP or SOB at this time.

## 2021-12-30 NOTE — ED PROVIDER NOTE - PATIENT PORTAL LINK FT
You can access the FollowMyHealth Patient Portal offered by [facility] by registering at the following website: [email or web address]. By joining [facility]’s FollowMyHealth portal, you will also be able to view your health information using other applications (apps) compatible with our system.

## 2021-12-30 NOTE — ED ADULT TRIAGE NOTE - CHIEF COMPLAINT QUOTE
sent in by dr. chisholm for chest discomfort and palpitations that increased in past 2 hours Hx; MI, PPM/AICD, patient's spouse Sobeida Belkis #[phone]

## 2021-12-30 NOTE — ED PROVIDER NOTE - OBJECTIVE STATEMENT
88yom w/ hx of CAD, ICM, PPM/AICD p/w intermittent chest pressure and palpitations. States he has had 3 or 4 episodes this afternoon of sudden chest discomfort and a fluttering sensation in the chest, each lasting about 20 minutes, onset without provocation and self resolving. Has not felt any shocks from the ICD. Asymptomatic currently.

## 2021-12-30 NOTE — ED PROVIDER NOTE - NSFOLLOWUPINSTRUCTIONS_ED_ALL_ED_FT
Follow up with your cardiologist as soon as possible  Return to the ER with any new, worsening or persistent symptoms.

## 2021-12-30 NOTE — ED ADULT NURSE NOTE - CHIEF COMPLAINT QUOTE
sent in by dr. chisholm for chest discomfort and palpitations that increased in past 2 hours Hx; MI, PPM/AICD, patient's spouse Sobeida Belkis #[phone]

## 2022-02-22 NOTE — ED PROVIDER NOTE - NS ED SCRIBE STATEMENT
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.9)  Rehab Diagnosis/Code: R26.0 delayed milestones in childhood, R27.8 lack of coordination  Referring Practitioner: JOZEF Hernandez  Referral Date: 2022    INSURANCE  Insurance Information: [employer]   Total number of visits approved: 61 OT visits  Total number of visits to date: evaluation only    Medical History Given by: Mother, Sophia Mcdermott  Birth/Medical/Developmental History: See [facility] for comprehensive medical update  Birth weight: 7 pounds, 2 ounces  [x] Full Term []Premature  Delivery: [x]Vaginal []  Presentation: [x]Normal [] Breech  [] Seizures  []Anoxia  []Bleeding  [] NICU Stay  Developmental History: Trisomy 21  Rollin months  Sittin months  4 point Creepin months  Walkin months    Medications: Refer to patients medical questionnaire for detailed medication list.    Other Medical Procedures and Tests: none  Adaptive Equipment: glasses (sees Dr. Claritza Kelley)  Allergies: none known    Precautions:  [] NPO  [] Diet restrictions:_____________________  [] ROM______________________________  [] Weight bearing _______________________  [] Other ________________________________  [x] None    HOME ENVIRONMENT:   lives with:  [x]Birth Parent(s): Rohan West and Funmilayo Sagastume  []Adoptive Parent(s)  [](s)  [x] Siblings: younger brother, 2 yr old  []Other:  Primary Language: [x]English []Yoruba []Other _________________  Domestic Concerns: [x] Not Present [] Yes (action taken:)  Family Goals/Concerns: 1) speak/more sounds, 2) drink from open cup, 3) eat wider variety of foods.   Related Services: Speech therapy at [facility], [facility] through [facility] with speech, occupational and physical therapies. Speech therapist at [facility] uses [employer]. PAIN  [x]No     []Yes      Location: N/A   Pain Rating (0-10 pain scale): N/A  Pain Description: N/A      ASSESSMENT:    Standardized Test:  See written test form for comprehensive/specific test results  []BOT-2  [x]PDMS-2  []PEDI  [x]Sensory  Profile  [] Other      Peabody Developmental Motor Scales (PDMS-2)   Visual-Motor  Raw Score: 120, Standard Score: 6, Percentile: 9th, Age Equivalent: 40 months, Descriptive: below average      Continued Assessment: (X) indicates Patient is currently completing/ deficit/impaired  Neuromuscular Status:   Age Appropriate Delayed/Impaired   Muscle Tone  X- low tone   ROM X    Strength- not directly assessed     Reflexes- further assessment needed  X- based on symptoms, there are unintegrated primary motor reflex patterns   Gross Motor-not directly assessed     Fine Motor  X- below average per PDMS   Movement Quality  X- some shakiness with writing/drawing   Motor Planning  X- good in some areas, fair(-)/poor in others, particularly with oral motor skills   Visual Tracking -  too young for formal assessment      Additional Comments:    Gross motor/ gravitational security:Likes to climb, slide and swing, he takes lots of risks with climbing ie going down a firepole at playground without assist. Will climb to high heights without apparent concern for safety. Fine Motor: Used pincer grasp well. No clear hand dominance. Often hyperextends fingers, sometimes points or stabilizes with middle finger. There is some shakiness during drawing. Visual- perceptual: able to name some basic shapes. Sensory Processing:  Sensory Profile- Child: This questionnaire was completed by mother, Ryder Garcia.   Results as follows:     Raw Score Total Much Less Than Others Less Than Others Just Like Majority of Others More Than Others Much More Than Others   Quadrants         Seeking/Seeker   34/95   X     Avoiding/Avoider   43/100   X Sensitivity/ Sensor   38/95   X     Registration/Bystander   31/110   X     Sensory Sections         Auditory   13/40   X     Visual   12/30   X     Touch   16/55   X     Movement   11/40   X     Body Position   11/40   X     Oral   28/50    X    Behavioral Sections         Conduct   12/45   X     Social Emotional   36/70    X    Attentional   15/50   X       Additional Comments: Mother believes that Shane's difficulty with communication and speech contribute to frustration and then sometimes defeat and giving up when he is not able to get his ideas across. Cognitive/Behavioral/Sensory   Age Appropriate Delayed/Impaired   Attention  X- able to hold attention very well on preferred, others there is some challenge   Direction Following X    Problem Solving  X- in some areas, particularly with motor challenges   Social-Emotional Behavior  X- overall does very well, gets frustrated with lack of speech and communication   Visual Perception -  not formally assessed     Visual Motor/Handwriting   X- below average per PDMS   Cognitive/Communication  X- communication skills   Additional Comments:  Per mother's report he has not ever made deep throat sounds such as a hard \"g\" sound. Activities of Daily Living   Age Appropriate Delayed/Impaired   Dressing- further interview needed     Feeding  X- limited diet   Hygiene/Bathing  X- distress with some aspects of grooming/hygiene-   Toileting - further interview needed     Play skills  X-  Engaged very well, however is delayed per typical milestones   Sleeping - no concerns reported, further interview may be needed       Additional Comments:   Feeding: Was slow to transition to table foods, took bottle until age 3. There is a limited diet. Diet does not include fruits expect raisins. Loves starches. Eats hummus, cucumbers, occasionally salad, nuts; meat include: cheese burger, chicken, hot dog, fish.   Will eat mixed texture foods such as chili, usually when on a chip for crunchiness. Mother to share food list of what Jose Zhou generally will eat. She reports he has a high pallet. He       Oral Motor Skills: Unable to touch tongue to upper lip or corners of mouth. While eating nacho cracker appears to create bolus with some challenge, cleared and swallowed well. Drink primarily from a water bottle with straw, did drink from open cup during session and mother was surprised by this. Mother reports he is stronger on R side of mouth than L. No deep throat sounds, ie  hard \"g\". Problem List  []Decrease ROM  []Decrease Strength  []Decrease Fine Motor Skills  []Decrease Attention  []Decrease Sensory Processing  []Decrease ADL Skills  []Other    Short Term Goals: Completed by 6 months from this evaluation date  1. Patient/Caregiver will be independent with home exercise program.  2.  Jose Zhou will participate in core strengthening ax on a regular basis in sessions and maintain good postural control given moderate challenge to core. 3.  Jose Zhou will receive therapeutic input to mouth and oral area to support development of oral motor skills with good response. [address] [facility],  [address] will take 3-5 sip from an open cup with minimal or less spilling. Debbie Long will bite down on fruits in gauze 3x per session with no gagging or averse response. 10. Jose Zhou will communicate his wants, needs and answers understandable at least 40% during session. 7. Jose Zhou will color in a 2\"x2\" picture covering at least 60% or more with min verbal cues. Long Term Goals:   1. Maximize Functional independence. 2. Assist with discharge planning.     Suggest Professional Referral: [x]No [] Yes:     Treatment Plan:  []NDT  [x]SI  []Therapeutic Listening  []Splinting/Casting  []Adaptive Equipment  [x]Fine Motor   []Visual Motor/ Perceptual  [x]Oral Motor/ Feeding  [x]Patient/family Education  [x]Other: hippotherapy as a treatment tool     Patient tolerated todays evaluation:    [x] Good   []  Fair   [] Poor    Treatment Given Today: [x] Evaluation           [x]Plans/ Goals discussed with pt/family/caregiver(s)                                          [x] Risks Benefits discussed with pt/family/caregiver(s)  Patient would benefit from skilled OT services to address deficits in oral motor skills, speech/communication, fine motor deficits, and feeding to allow for progress towards obtaining age appropriate skills for functional independence. Exercises Given Today:  Evaluation only  RECOMMENDATIONS: Patient to be seen by OT 1 time per week for 6 months from date of evaluation. Limitations/difficulties with evaluation session due to:   []Pain     []Fatigue     []Other medical complications     []Other         TIME   Time Treatment session was INITIATED 1:00   Time Treatment session was STOPPED 2:30    MINUTES   Total TIMED minutes 90   Total UNTIMED minutes 0   Total TREATMENT minutes 90     Charges: Moderately complex eval  History: Personal Factors/Comorbidities        [] (0)                           [x] (1-2)            [] (3+)  Exam: Limitations/Restrictions  [] (1-2)            [x] (3)               [] (4+)  Clinical Presentation: Progression  [] Stable         [x] Evolving      [] Unstable  Decision Making: Complexity  [] Low             [x] Moderate    [] High  Eval Complexity:  [] Low  [x] Moderate      [] High     Electronically signed by:    Angel Henderson MS, OTR/L             Date:2/22/2022      Regulatory Requirements    By signing above or cosigning this note, I have reviewed this plan of care and certify a need for medically necessary rehabilitation services.     Physician Signature:_____________________________________    Date:_________________________________  Please sign and fax to [phone]       [facility]#:  [id number] Attending

## 2022-03-20 ENCOUNTER — APPOINTMENT (OUTPATIENT)
Dept: ELECTROPHYSIOLOGY | Facility: CLINIC | Age: 87
End: 2022-03-20
Payer: MEDICARE

## 2022-03-21 ENCOUNTER — NON-APPOINTMENT (OUTPATIENT)
Age: 87
End: 2022-03-21

## 2022-03-21 PROCEDURE — 93296 REM INTERROG EVL PM/IDS: CPT

## 2022-03-21 PROCEDURE — 93295 DEV INTERROG REMOTE 1/2/MLT: CPT

## 2022-04-04 NOTE — PROCEDURE NOTE - NSINFORMCONSENT_GEN_A_CORE
denies pain/discomfort
Benefits, risks, and possible complications of procedure explained to patient/caregiver who verbalized understanding and gave written consent.

## 2022-04-27 ENCOUNTER — APPOINTMENT (OUTPATIENT)
Dept: ELECTROPHYSIOLOGY | Facility: CLINIC | Age: 87
End: 2022-04-27
Payer: MEDICARE

## 2022-04-27 ENCOUNTER — NON-APPOINTMENT (OUTPATIENT)
Age: 87
End: 2022-04-27

## 2022-04-27 VITALS
OXYGEN SATURATION: 96 % | HEART RATE: 76 BPM | DIASTOLIC BLOOD PRESSURE: 60 MMHG | WEIGHT: 154 LBS | HEIGHT: 65 IN | RESPIRATION RATE: 14 BRPM | SYSTOLIC BLOOD PRESSURE: 100 MMHG | BODY MASS INDEX: 25.66 KG/M2

## 2022-04-27 PROCEDURE — 93290 INTERROG DEV EVAL ICPMS IP: CPT | Mod: 26

## 2022-04-27 PROCEDURE — 93283 PRGRMG EVAL IMPLANTABLE DFB: CPT

## 2022-06-04 ENCOUNTER — INPATIENT (INPATIENT)
Facility: HOSPITAL | Age: 87
LOS: 1 days | Discharge: HOME | DRG: 291 | End: 2022-06-06
Attending: HOSPITALIST | Admitting: HOSPITALIST
Payer: MEDICARE

## 2022-06-04 VITALS
HEIGHT: 68 IN | HEART RATE: 85 BPM | WEIGHT: 145.06 LBS | RESPIRATION RATE: 16 BRPM | SYSTOLIC BLOOD PRESSURE: 100 MMHG | OXYGEN SATURATION: 98 % | TEMPERATURE: 98 F | DIASTOLIC BLOOD PRESSURE: 75 MMHG

## 2022-06-04 DIAGNOSIS — Z98.890 OTHER SPECIFIED POSTPROCEDURAL STATES: Chronic | ICD-10-CM

## 2022-06-04 DIAGNOSIS — Z87.2 PERSONAL HISTORY OF DISEASES OF THE SKIN AND SUBCUTANEOUS TISSUE: Chronic | ICD-10-CM

## 2022-06-04 DIAGNOSIS — Z90.89 ACQUIRED ABSENCE OF OTHER ORGANS: Chronic | ICD-10-CM

## 2022-06-04 DIAGNOSIS — Z41.9 ENCOUNTER FOR PROCEDURE FOR PURPOSES OTHER THAN REMEDYING HEALTH STATE, UNSPECIFIED: Chronic | ICD-10-CM

## 2022-06-04 DIAGNOSIS — Z95.810 PRESENCE OF AUTOMATIC (IMPLANTABLE) CARDIAC DEFIBRILLATOR: Chronic | ICD-10-CM

## 2022-06-04 LAB
ALBUMIN SERPL ELPH-MCNC: 3.2 G/DL — LOW (ref 3.3–5)
ALP SERPL-CCNC: 185 U/L — HIGH (ref 40–120)
ALT FLD-CCNC: 34 U/L — SIGNIFICANT CHANGE UP (ref 12–78)
ANION GAP SERPL CALC-SCNC: 6 MMOL/L — SIGNIFICANT CHANGE UP (ref 5–17)
APPEARANCE UR: CLEAR — SIGNIFICANT CHANGE UP
AST SERPL-CCNC: 36 U/L — SIGNIFICANT CHANGE UP (ref 15–37)
BASOPHILS # BLD AUTO: 0.06 K/UL — SIGNIFICANT CHANGE UP (ref 0–0.2)
BASOPHILS NFR BLD AUTO: 0.8 % — SIGNIFICANT CHANGE UP (ref 0–2)
BILIRUB SERPL-MCNC: 1.6 MG/DL — HIGH (ref 0.2–1.2)
BILIRUB UR-MCNC: NEGATIVE — SIGNIFICANT CHANGE UP
BUN SERPL-MCNC: 33 MG/DL — HIGH (ref 7–23)
CALCIUM SERPL-MCNC: 9.3 MG/DL — SIGNIFICANT CHANGE UP (ref 8.5–10.1)
CHLORIDE SERPL-SCNC: 101 MMOL/L — SIGNIFICANT CHANGE UP (ref 96–108)
CO2 SERPL-SCNC: 30 MMOL/L — SIGNIFICANT CHANGE UP (ref 22–31)
COLOR SPEC: YELLOW — SIGNIFICANT CHANGE UP
CREAT SERPL-MCNC: 1.37 MG/DL — HIGH (ref 0.5–1.3)
D DIMER BLD IA.RAPID-MCNC: 168 NG/ML DDU — SIGNIFICANT CHANGE UP
DIFF PNL FLD: ABNORMAL
EGFR: 50 ML/MIN/1.73M2 — LOW
EOSINOPHIL # BLD AUTO: 0.24 K/UL — SIGNIFICANT CHANGE UP (ref 0–0.5)
EOSINOPHIL NFR BLD AUTO: 3 % — SIGNIFICANT CHANGE UP (ref 0–6)
GLUCOSE SERPL-MCNC: 120 MG/DL — HIGH (ref 70–99)
GLUCOSE UR QL: NEGATIVE — SIGNIFICANT CHANGE UP
HCT VFR BLD CALC: 40 % — SIGNIFICANT CHANGE UP (ref 39–50)
HGB BLD-MCNC: 13 G/DL — SIGNIFICANT CHANGE UP (ref 13–17)
IMM GRANULOCYTES NFR BLD AUTO: 0.3 % — SIGNIFICANT CHANGE UP (ref 0–1.5)
KETONES UR-MCNC: NEGATIVE — SIGNIFICANT CHANGE UP
LEUKOCYTE ESTERASE UR-ACNC: NEGATIVE — SIGNIFICANT CHANGE UP
LIDOCAIN IGE QN: 152 U/L — SIGNIFICANT CHANGE UP (ref 73–393)
LYMPHOCYTES # BLD AUTO: 0.81 K/UL — LOW (ref 1–3.3)
LYMPHOCYTES # BLD AUTO: 10.2 % — LOW (ref 13–44)
MAGNESIUM SERPL-MCNC: 2.2 MG/DL — SIGNIFICANT CHANGE UP (ref 1.6–2.6)
MCHC RBC-ENTMCNC: 29.5 PG — SIGNIFICANT CHANGE UP (ref 27–34)
MCHC RBC-ENTMCNC: 32.5 GM/DL — SIGNIFICANT CHANGE UP (ref 32–36)
MCV RBC AUTO: 90.9 FL — SIGNIFICANT CHANGE UP (ref 80–100)
MONOCYTES # BLD AUTO: 0.73 K/UL — SIGNIFICANT CHANGE UP (ref 0–0.9)
MONOCYTES NFR BLD AUTO: 9.2 % — SIGNIFICANT CHANGE UP (ref 2–14)
NEUTROPHILS # BLD AUTO: 6.07 K/UL — SIGNIFICANT CHANGE UP (ref 1.8–7.4)
NEUTROPHILS NFR BLD AUTO: 76.5 % — SIGNIFICANT CHANGE UP (ref 43–77)
NITRITE UR-MCNC: NEGATIVE — SIGNIFICANT CHANGE UP
NT-PROBNP SERPL-SCNC: HIGH PG/ML (ref 0–450)
PH UR: 6 — SIGNIFICANT CHANGE UP (ref 5–8)
PLATELET # BLD AUTO: 220 K/UL — SIGNIFICANT CHANGE UP (ref 150–400)
POTASSIUM SERPL-MCNC: 4.3 MMOL/L — SIGNIFICANT CHANGE UP (ref 3.5–5.3)
POTASSIUM SERPL-SCNC: 4.3 MMOL/L — SIGNIFICANT CHANGE UP (ref 3.5–5.3)
PROT SERPL-MCNC: 7.1 GM/DL — SIGNIFICANT CHANGE UP (ref 6–8.3)
PROT UR-MCNC: NEGATIVE — SIGNIFICANT CHANGE UP
RBC # BLD: 4.4 M/UL — SIGNIFICANT CHANGE UP (ref 4.2–5.8)
RBC # FLD: 18 % — HIGH (ref 10.3–14.5)
SODIUM SERPL-SCNC: 137 MMOL/L — SIGNIFICANT CHANGE UP (ref 135–145)
SP GR SPEC: 1.02 — SIGNIFICANT CHANGE UP (ref 1.01–1.02)
TROPONIN I, HIGH SENSITIVITY RESULT: 14.89 NG/L — SIGNIFICANT CHANGE UP
UROBILINOGEN FLD QL: 1
WBC # BLD: 7.93 K/UL — SIGNIFICANT CHANGE UP (ref 3.8–10.5)
WBC # FLD AUTO: 7.93 K/UL — SIGNIFICANT CHANGE UP (ref 3.8–10.5)

## 2022-06-04 PROCEDURE — 99285 EMERGENCY DEPT VISIT HI MDM: CPT

## 2022-06-04 PROCEDURE — 93010 ELECTROCARDIOGRAM REPORT: CPT

## 2022-06-04 PROCEDURE — 71045 X-RAY EXAM CHEST 1 VIEW: CPT | Mod: 26

## 2022-06-04 RX ORDER — FUROSEMIDE 40 MG
20 TABLET ORAL ONCE
Refills: 0 | Status: COMPLETED | OUTPATIENT
Start: 2022-06-04 | End: 2022-06-04

## 2022-06-04 RX ADMIN — Medication 20 MILLIGRAM(S): at 22:17

## 2022-06-04 NOTE — ED PROVIDER NOTE - OBJECTIVE STATEMENT
89 y/o male with a PMHx of CHF systolic, pacemaker, defibrillator, vtach, htn, hld, and MI no stents presents to the ED c/o progressively worsening SOB x3 days. Pt states he becomes winded after a few steps. son is a PA working for Dr. Matthews. Pt of Dr. Herson Matthews. 89 y/o male with a PMHx of CHF systolic, pacemaker, defibrillator, vtach, htn, hld, and MI no stents presents to the ED c/o progressively worsening SOB x3 days. Pt states he becomes winded after a few steps. son is a PA working for Dr. Herson Almanzar's office evaluated patient and instructed patient to come to the ED. No fever or chills. No nuchal rigidity. No saddle anesthesia. No visual or focal neurological complaints. No melena or hematochezia. No recent trauma. No recent exotic travel. No saddle anesthesia. No abdominal pain, fever, chills, melena, nausea or vomiting. Pt of Dr. Herson Almanzar cardio and PMD.

## 2022-06-04 NOTE — ED PROVIDER NOTE - CONSTITUTIONAL, MLM
Well appearing, awake, alert, oriented to person, place, time/situation and in no apparent distress. normal... Well appearing, awake, alert, oriented to person, place, time/situation and in no apparent distress. Nontoxic appearing. AAOx4.

## 2022-06-04 NOTE — ED PROVIDER NOTE - NS_ ATTENDINGSCRIBEDETAILS _ED_A_ED_FT
I Franklin Henriquez MD saw and examined the patient. Scribe documented for me and under my supervision. I have modified the scribe's documentation where necessary to reflect my history, physical exam and other relevant documentations pertinent to the care of the patient.

## 2022-06-04 NOTE — ED PROVIDER NOTE - PROGRESS NOTE DETAILS
Florence ABDI: Hx of CHF, high risk with hx of Vtach, ppm/AICD, worsening SOB, AVILA, symptoms consistent with worsening CHF, CHF exacerbation, will admit inpatient for further care and evaluation. Not safe to go home. Spoke with patient and wife who is with patient with patient's consent and wish both amicable for admission to see Dr. Almanzar inpatient/further eval. Hospitalist admission is appreciate via Dr. Elza Whitfield.

## 2022-06-04 NOTE — ED ADULT NURSE NOTE - OBJECTIVE STATEMENT
pt BIBA c/o SOB and dyspnea on exertion x 2days. pt taken to room for EKG.  During initial assessment, pt states they take Lasix routinely.  Pt endorses PMH CHF.

## 2022-06-05 DIAGNOSIS — I50.9 HEART FAILURE, UNSPECIFIED: ICD-10-CM

## 2022-06-05 LAB
ANION GAP SERPL CALC-SCNC: 5 MMOL/L — SIGNIFICANT CHANGE UP (ref 5–17)
BUN SERPL-MCNC: 34 MG/DL — HIGH (ref 7–23)
CALCIUM SERPL-MCNC: 8.9 MG/DL — SIGNIFICANT CHANGE UP (ref 8.5–10.1)
CHLORIDE SERPL-SCNC: 103 MMOL/L — SIGNIFICANT CHANGE UP (ref 96–108)
CO2 SERPL-SCNC: 30 MMOL/L — SIGNIFICANT CHANGE UP (ref 22–31)
CREAT SERPL-MCNC: 1.28 MG/DL — SIGNIFICANT CHANGE UP (ref 0.5–1.3)
EGFR: 54 ML/MIN/1.73M2 — LOW
GLUCOSE SERPL-MCNC: 92 MG/DL — SIGNIFICANT CHANGE UP (ref 70–99)
MAGNESIUM SERPL-MCNC: 2 MG/DL — SIGNIFICANT CHANGE UP (ref 1.6–2.6)
POTASSIUM SERPL-MCNC: 3.5 MMOL/L — SIGNIFICANT CHANGE UP (ref 3.5–5.3)
POTASSIUM SERPL-SCNC: 3.5 MMOL/L — SIGNIFICANT CHANGE UP (ref 3.5–5.3)
SODIUM SERPL-SCNC: 138 MMOL/L — SIGNIFICANT CHANGE UP (ref 135–145)
TROPONIN I, HIGH SENSITIVITY RESULT: 11.72 NG/L — SIGNIFICANT CHANGE UP
TROPONIN I, HIGH SENSITIVITY RESULT: 13.72 NG/L — SIGNIFICANT CHANGE UP

## 2022-06-05 PROCEDURE — 83880 ASSAY OF NATRIURETIC PEPTIDE: CPT

## 2022-06-05 PROCEDURE — 80048 BASIC METABOLIC PNL TOTAL CA: CPT

## 2022-06-05 PROCEDURE — 84100 ASSAY OF PHOSPHORUS: CPT

## 2022-06-05 PROCEDURE — 99223 1ST HOSP IP/OBS HIGH 75: CPT

## 2022-06-05 PROCEDURE — 12345: CPT | Mod: NC

## 2022-06-05 PROCEDURE — 36415 COLL VENOUS BLD VENIPUNCTURE: CPT

## 2022-06-05 PROCEDURE — 83735 ASSAY OF MAGNESIUM: CPT

## 2022-06-05 RX ORDER — ALLOPURINOL 300 MG
100 TABLET ORAL
Refills: 0 | Status: DISCONTINUED | OUTPATIENT
Start: 2022-06-05 | End: 2022-06-06

## 2022-06-05 RX ORDER — FUROSEMIDE 40 MG
1 TABLET ORAL
Qty: 0 | Refills: 0 | DISCHARGE

## 2022-06-05 RX ORDER — AMIODARONE HYDROCHLORIDE 400 MG/1
200 TABLET ORAL DAILY
Refills: 0 | Status: DISCONTINUED | OUTPATIENT
Start: 2022-06-05 | End: 2022-06-06

## 2022-06-05 RX ORDER — FUROSEMIDE 40 MG
20 TABLET ORAL DAILY
Refills: 0 | Status: DISCONTINUED | OUTPATIENT
Start: 2022-06-05 | End: 2022-06-05

## 2022-06-05 RX ORDER — OMEGA-3 ACID ETHYL ESTERS 1 G
2 CAPSULE ORAL
Refills: 0 | Status: DISCONTINUED | OUTPATIENT
Start: 2022-06-05 | End: 2022-06-06

## 2022-06-05 RX ORDER — FUROSEMIDE 40 MG
40 TABLET ORAL
Refills: 0 | Status: DISCONTINUED | OUTPATIENT
Start: 2022-06-06 | End: 2022-06-06

## 2022-06-05 RX ORDER — FUROSEMIDE 40 MG
20 TABLET ORAL ONCE
Refills: 0 | Status: COMPLETED | OUTPATIENT
Start: 2022-06-05 | End: 2022-06-05

## 2022-06-05 RX ORDER — ASPIRIN/CALCIUM CARB/MAGNESIUM 324 MG
325 TABLET ORAL DAILY
Refills: 0 | Status: DISCONTINUED | OUTPATIENT
Start: 2022-06-05 | End: 2022-06-06

## 2022-06-05 RX ORDER — CHOLECALCIFEROL (VITAMIN D3) 125 MCG
1000 CAPSULE ORAL DAILY
Refills: 0 | Status: DISCONTINUED | OUTPATIENT
Start: 2022-06-05 | End: 2022-06-06

## 2022-06-05 RX ORDER — PREGABALIN 225 MG/1
1000 CAPSULE ORAL DAILY
Refills: 0 | Status: DISCONTINUED | OUTPATIENT
Start: 2022-06-05 | End: 2022-06-06

## 2022-06-05 RX ORDER — LEVOTHYROXINE SODIUM 125 MCG
50 TABLET ORAL DAILY
Refills: 0 | Status: DISCONTINUED | OUTPATIENT
Start: 2022-06-05 | End: 2022-06-06

## 2022-06-05 RX ORDER — DAPAGLIFLOZIN 10 MG/1
10 TABLET, FILM COATED ORAL EVERY 24 HOURS
Refills: 0 | Status: DISCONTINUED | OUTPATIENT
Start: 2022-06-05 | End: 2022-06-06

## 2022-06-05 RX ORDER — CARVEDILOL PHOSPHATE 80 MG/1
9.38 CAPSULE, EXTENDED RELEASE ORAL EVERY 12 HOURS
Refills: 0 | Status: DISCONTINUED | OUTPATIENT
Start: 2022-06-05 | End: 2022-06-06

## 2022-06-05 RX ORDER — HEPARIN SODIUM 5000 [USP'U]/ML
5000 INJECTION INTRAVENOUS; SUBCUTANEOUS EVERY 12 HOURS
Refills: 0 | Status: DISCONTINUED | OUTPATIENT
Start: 2022-06-05 | End: 2022-06-06

## 2022-06-05 RX ADMIN — DAPAGLIFLOZIN 10 MILLIGRAM(S): 10 TABLET, FILM COATED ORAL at 13:34

## 2022-06-05 RX ADMIN — Medication 100 MILLIGRAM(S): at 21:15

## 2022-06-05 RX ADMIN — Medication 2 GRAM(S): at 21:15

## 2022-06-05 RX ADMIN — Medication 20 MILLIGRAM(S): at 02:32

## 2022-06-05 RX ADMIN — AMIODARONE HYDROCHLORIDE 200 MILLIGRAM(S): 400 TABLET ORAL at 10:09

## 2022-06-05 RX ADMIN — Medication 50 MICROGRAM(S): at 05:50

## 2022-06-05 RX ADMIN — Medication 325 MILLIGRAM(S): at 10:09

## 2022-06-05 RX ADMIN — Medication 100 MILLIGRAM(S): at 10:08

## 2022-06-05 RX ADMIN — HEPARIN SODIUM 5000 UNIT(S): 5000 INJECTION INTRAVENOUS; SUBCUTANEOUS at 21:15

## 2022-06-05 RX ADMIN — Medication 1000 UNIT(S): at 10:10

## 2022-06-05 RX ADMIN — Medication 20 MILLIGRAM(S): at 05:50

## 2022-06-05 RX ADMIN — Medication 2 GRAM(S): at 10:08

## 2022-06-05 RX ADMIN — PREGABALIN 1000 MICROGRAM(S): 225 CAPSULE ORAL at 10:09

## 2022-06-05 NOTE — H&P ADULT - ASSESSMENT
#Acute on chronic HFrEF  elevated BNP 13K  #AICD/PPM  1. admit to telemetry  2. serial trop  3. daily weight  4. I/O  5. lasix 20 mg IV daily w parameters  6. coreg 9.375 BID w parameters  7. echo  8. cardiology      #CAD  1. asa 325 mg      #Hx VT  1. amiodarone 200 mg qd      #Gout  1. allopurinol 100 mg BID      #Hypothyroid  1. levothyroxine        #VTE

## 2022-06-05 NOTE — PATIENT PROFILE ADULT - FALL HARM RISK - ATTEMPT OOB
Attempted to call back patient to inform him that we are running low on the shingles injection. Patient did not answer the phone I did leave a voice mail.    No

## 2022-06-05 NOTE — DIETITIAN INITIAL EVALUATION ADULT - ADD RECOMMEND
1) continue with low sodium diet and encourage protein enriched foods with all meals 2) monitor daily weights track trends 3) Monitor lytes and hydration replete prn 4) Monitor BM if none x > 3 days initiate bowel meds 5) follow up with RD in community to continuity of care and adherence to therapeutic diet.

## 2022-06-05 NOTE — PATIENT PROFILE ADULT - DO YOU LACK THE NECESSARY SUPPORT TO HELP YOU COPE WITH LIFE CHALLENGES?
Is This A New Presentation, Or A Follow-Up?: Skin Lesion How Severe Is Your Skin Lesion?: moderate Has Your Skin Lesion Been Treated?: not been treated What Type Of Note Output Would You Prefer (Optional)?: Standard Output Additional History: Pt stated that he had an acne spot but it’s getting better, the spot on his arm Mop stated they thought was wart. no

## 2022-06-05 NOTE — H&P ADULT - NSHPPHYSICALEXAM_GEN_ALL_CORE
Vital Signs Last 24 Hrs  T(C): 36.3 (05 Jun 2022 02:51), Max: 36.7 (05 Jun 2022 01:46)  T(F): 97.4 (05 Jun 2022 02:51), Max: 98 (05 Jun 2022 01:46)  HR: 75 (05 Jun 2022 02:51) (64 - 85)  BP: 107/67 (05 Jun 2022 02:51) (100/64 - 107/67)  BP(mean): --  RR: 18 (05 Jun 2022 02:51) (16 - 18)  SpO2: 100% (05 Jun 2022 02:51) (98% - 100%)

## 2022-06-05 NOTE — PROVIDER CONTACT NOTE (OTHER) - ACTION/TREATMENT ORDERED:
MD Dasilva aware, no interventions at this time. Ambulate with assistance order per MD Dasilva- pt refusing bed alarm, MD Dasilva aware. No other orders at this time.

## 2022-06-05 NOTE — DIETITIAN INITIAL EVALUATION ADULT - PERTINENT MEDS FT
MEDICATIONS  (STANDING):  allopurinol 100 milliGRAM(s) Oral two times a day  aMIOdarone    Tablet 200 milliGRAM(s) Oral daily  aspirin 325 milliGRAM(s) Oral daily  carvedilol 9.375 milliGRAM(s) Oral every 12 hours  cholecalciferol 1000 Unit(s) Oral daily  cyanocobalamin 1000 MICROGram(s) Oral daily  furosemide   Injectable 20 milliGRAM(s) IV Push daily  heparin   Injectable 5000 Unit(s) SubCutaneous every 12 hours  levothyroxine 50 MICROGram(s) Oral daily  omega-3-Acid Ethyl Esters 2 Gram(s) Oral two times a day    MEDICATIONS  (PRN):

## 2022-06-05 NOTE — DIETITIAN NUTRITION RISK NOTIFICATION - ADDITIONAL COMMENTS/DIETITIAN RECOMMENDATIONS
Additional Recommendations  1) continue with low sodium diet and encourage protein enriched foods with all meals 2) monitor daily weights track trends 3) Monitor lytes and hydration replete prn 4) Monitor BM if none x > 3 days initiate bowel meds 5) follow up with RD in community to continuity of care and adherence to therapeutic diet.

## 2022-06-05 NOTE — H&P ADULT - HISTORY OF PRESENT ILLNESS
88 year old male w hx CAD s/p MI age 54,  HFrEF 25, AICD/ PPM, hx VT, gout,  HTN, hypothyroid, prostate cancer who came to ED by EMS c/o increased SOB      Pt states has had increased SOB when walking up stairs x 3 days  denied lightheadedness  denied chest pain  denied SOB at rest  pt states wife insisted he go to ED for evaluation  was taking furosemide every other day    Ambulance call report reviewed    In /75   HR 85   RR 16   T 97.9   98%  lasix 20 mg IV    PAST MEDICAL HX  H/O ventricular tachycardia PPM placed 2008  HTN Hypertension   Hypothyroid  Myocardial infarction 54 years old  Prostate cancer    PAST SURGICAL HX  Elective surgery right middle finger surgery, no hardware  H/O pilonidal cyst   H/O prostate biopsy cancer treated with prostate seeding  History of tonsillectomy as child  Implantable cardioverter-defibrillator (ICD) in situ 2008.     FAMILY HX  FHx: breast cancer, mother  FHx: myocardial infarction, father.    SOCIAL HX    Lives at home  nonsmoker

## 2022-06-05 NOTE — PROGRESS NOTE ADULT - SUBJECTIVE AND OBJECTIVE BOX
CHIEF COMPLAINT: SOB    SUBJECTIVE: SOB resolved back to baseline. Edema LE improved. Denies fever, chills, cough, chest pain, nausea and vomiting. SBP 85-95. Asymptomatic and reported baseline  SBP ~90. Continue to monitor.     SIGNIFICANT INTERVAL EVENTS/OVERNIGHT EVENTS: None    Review of Systems: 14 Point review of systems reviewed and reported as negative unless otherwise stated in HPI    FROM H&P:  "88 year old male w hx CAD s/p MI age 54,  HFrEF 25, AICD/ PPM, hx VT, gout,  HTN, hypothyroid, prostate cancer who came to ED by EMS c/o increased SOB      Pt states has had increased SOB when walking up stairs x 3 days  denied lightheadedness  denied chest pain  denied SOB at rest  pt states wife insisted he go to ED for evaluation  was taking furosemide every other day    Ambulance call report reviewed    In /75   HR 85   RR 16   T 97.9   98%  lasix 20 mg IV"    PHYSICAL EXAM:    T(C): 36.3 (06-05-22 @ 07:40), Max: 36.7 (06-05-22 @ 01:46)  HR: 63 (06-05-22 @ 17:10) (63 - 85)  BP: 90/56 (06-05-22 @ 17:10) (85/52 - 107/67)  RR: 18 (06-05-22 @ 07:40) (16 - 18)  SpO2: 94% (06-05-22 @ 07:40) (94% - 100%)    General: AAOx3; NAD  Head: AT/NC  ENT: Moist Mucous Membranes; No Injury  Eyes: EOMI; PERRL  Neck: Non-tender; No JVD  CVS: RRR, S1&S2, Murmur +; LE edema  Respiratory: Lungs CTA B/L; Normal Respiratory Effort  Abdomen/GI: Soft, non-tender, non-distended, no guarding, no rebound, normal bowel sounds  : No bladder distention, No Reyes  Extremities: No cyanosis, No clubbing, LE edema  MSK: No CVA tenderness, Normal ROM, No injury  Neuro: AAOx3, CNII-XII grossly intact, non-focal  Psych: Appropriate, Cooperative,   Skin: Clean, Dry and Intact      LABS:                          13.0   7.93  )-----------( 220      ( 04 Jun 2022 21:22 )             40.0     06-05    138  |  103  |  34<H>  ----------------------------<  92  3.5   |  30  |  1.28    Ca    8.9      05 Jun 2022 05:32  Mg     2.0     06-05    TPro  7.1  /  Alb  3.2<L>  /  TBili  1.6<H>  /  DBili  x   /  AST  36  /  ALT  34  /  AlkPhos  185<H>  06-04    COVID-19 PCR: NotDetec (04 Jun 2022 21:00)  COVID-19 PCR: NotDetec (30 Dec 2021 18:20)    CAPILLARY BLOOD GLUCOSE      Thyroid Stimulating Hormone, Serum: 3.08 uU/mL (06.05.22 @ 05:32) Troponin I, High Sensitivity Result: 11.72: Urinalysis (06.04.22 @ 23:04)   pH Urine: 6.0   Glucose Qualitative, Urine: Negative   Blood, Urine: Moderate   Color: Yellow   Urine Appearance: Clear   Bilirubin: Negative   Ketone - Urine: Negative   Specific Gravity: 1.020   Protein, Urine: Negative   Urobilinogen: 1   Nitrite: Negative   Leukocyte Esterase Concentration: NegativD-Dimer Assay, Quantitative: 168:Serum Pro-Brain Natriuretic Peptide: 03206 pg/mL (06.04.22 @ 21:22)         RADIOLOGY:  < from: Xray Chest 1 View-PORTABLE IMMEDIATE (06.04.22 @ 21:52) >  FINDINGS/  IMPRESSION:    The left ICD and an abandoned lead are unchanged.    Clear lungs. Small pleural effusions. No pneumothorax.    < end of copied text >      EKG:  < from: 12 Lead ECG (06.04.22 @ 21:03) >    Diagnosis Line Atrial-sensed ventricular-paced rhythm  Abnormal ECG  When compared with ECG of 30-DEC-2021 17:07,  Vent. rate has decreased BY   6 BPM    < end of copied text >      ECHO:  Pending            I personally reviewed labs, imaging, ekg, orders and vitals.    Discussed case with:  [X]RN  [X]CM/SW  [X]Patient  [x]Family Wife at bedside 6/5  [X]Specialist: Cardiology        MEDICATIONS  (STANDING):  allopurinol 100 milliGRAM(s) Oral two times a day  aMIOdarone    Tablet 200 milliGRAM(s) Oral daily  aspirin 325 milliGRAM(s) Oral daily  carvedilol 9.375 milliGRAM(s) Oral every 12 hours  cholecalciferol 1000 Unit(s) Oral daily  cyanocobalamin 1000 MICROGram(s) Oral daily  dapagliflozin 10 milliGRAM(s) Oral every 24 hours  heparin   Injectable 5000 Unit(s) SubCutaneous every 12 hours  levothyroxine 50 MICROGram(s) Oral daily  omega-3-Acid Ethyl Esters 2 Gram(s) Oral two times a day    MEDICATIONS  (PRN):

## 2022-06-05 NOTE — DIETITIAN INITIAL EVALUATION ADULT - OTHER INFO
88 year old male w hx CAD s/p MI age 54,  HFrEF 25, AICD/ PPM, hx VT, gout,  HTN, hypothyroid, prostate cancer who came to ED by EMS c/o increased SOB. Pt with fair po intake ~50-75% intake as per patient. Large discrepancy in admission weight and bed scale weight taken by RD on 6/5 139# (admission weight - 152.3#). NFPE indicates mild muscle/fat wasting. Will continue to monitor and follow up prn. See below for recommendations. Criteria met for moderate malnutrition.

## 2022-06-05 NOTE — PATIENT PROFILE ADULT - NSPROPOAPRESSUREINJURY_GEN_A_NUR
[address]  [phone]    Admission History and Physical      NAME:  Yue Hansen   :   1928   MRN:  [id number]     PCP:  Randi Vergara MD     Date of service:  10/21/2020         Subjective:     CHIEF COMPLAINT: Weakness, diarrhea, feeling sick    HISTORY OF PRESENT ILLNESS:     Ms. Jose Rafael Anguiano is a 80 y.o.  female who is admitted with generalized body weakness. Ms. Jose Rafael Anguiano with past medical history of CAD, hypertension, hyperlipidemia, hypothyroidism, osteoarthritis,  Presented to ER complaining of generalized body weakness, diarrhea and feeling sick for 5 days. Patient has been having diarrhea, which is sometimes becomes watery. Denies fever, cough, shortness of breath. Denies abdominal pain. Patient stated that she had contact with her son who had Covid 19 about 4 weeks ago. Denies flulike symptoms.       Past Medical History:   Diagnosis Date    Acute cystitis 2018    Anemia, unspecified     Anxiety     Arrhythmia     a fib    Arthritis     osteoarthritis, rheumatoid    Atrial fibrillation (HCC)     Dr. Gil Martinez and Dr. Francis Keith  following    Autoimmune disease [facility])     sjogren disease Dr. Zahida Ruiz CAD (coronary artery disease)     HTN (hypertension)     Hyperlipidemia LDL goal < 100     Hypothyroid     Insomnia     Osteoarthritis     Sjogren's disease ([facility] [address]     Thoracic aneurysm without mention of rupture         Past Surgical History:   Procedure Laterality Date    HX BIV-IMPLANTABLE CARDIOVERTER DEFIBRILLATOR      HX CHOLECYSTECTOMY      HX COLONOSCOPY  3/2014    Dr. Ese Rodriguez    HX OTHER SURGICAL      hernia repairs    HX PARTIAL THYROIDECTOMY      HX JANNET AND BSO      HX VEIN STRIPPING      VT INSJ ELTRD CAR COLLIN SYS TM INSJ DFB/PM PLS GEN Left 2019    Lv Lead Placement performed by Jose Elias Del Angel MD at [facility] CATH LAB    VT INSJ/RPLCMT PERM DFB W/TRNSVNS LDS 1/DUAL CHMBR Left 2/22/2019    upgrade PM to ICD-Bsx performed by Magda Azar MD at [facility] LAB       Social History     Tobacco Use    Smoking status: Never Smoker    Smokeless tobacco: Never Used   Substance Use Topics    Alcohol use: Yes     Comment: wine        Family History   Problem Relation Age of Onset    Heart Disease Father         aneurysm    Cancer Maternal Grandfather         Allergies   Allergen Reactions    Latex Hives     NOT ALLERGIC PER PT 02/01/2018    Pcn [Penicillins] Anaphylaxis    Sulfa (Sulfonamide Antibiotics) Anaphylaxis    Biaxin [Clarithromycin] Nausea Only    Biotin Unknown (comments)    Pcn [Penicillins] Hives    Sulfa (Sulfonamide Antibiotics) Nausea Only        Prior to Admission medications    Medication Sig Start Date End Date Taking? Authorizing Provider   PARoxetine (PAXIL) 10 mg tablet TAKE 1 AND 1/2 TABLET BY MOUTH DAILY 10/18/20   Cecy Sam MD   fosinopriL (MONOPRIL) 10 mg tablet TAKE THREE TABLETS BY MOUTH EVERY MORNING THEN TAKE TWO TABLETS BY MOUTH EVERY EVENING 9/20/20   Mercedes Brown MD   montelukast (SINGULAIR) 10 mg tablet Take 1 Tab by mouth daily as needed (allergies). 8/7/20   Cecy Sam MD   levothyroxine (SYNTHROID) 100 mcg tablet TAKE ONE TABLET BY MOUTH DAILY BEFORE BREAKFAST 7/28/20   Cecy Sam MD   loratadine (Claritin) 10 mg tablet Take 10 mg by mouth. Provider, Historical   ferrous sulfate 325 mg (65 mg iron) tablet Take 1 Tab by mouth Daily (before breakfast). Caution may cause constipation. Please take colace if needed. 7/14/20   Cecy Sam MD   triamcinolone acetonide (KENALOG) 0.1 % topical cream Apply  to affected area two (2) times a day. use thin layer x 7 days. Do not use if sx getting worse 6/24/20   Cecy Sam MD   ipratropium (ATROVENT) 42 mcg (0.06 %) nasal spray 1 [place] by Both Nostrils route three (3) times daily.  6/8/20 Randi Vergara MD   loperamide (Imodium A-D) 2 mg tablet Take 1-2 tabs following first loose stool and then 1 tab after each stool. Do not exceed 8 tabs/day. 5/22/20   Ksenia Walker MD   bismuth subsalicylate (PEPTO-BISMOL PO) Take  by mouth as needed. Provider, Historical   Biotin 2,500 mcg cap Take  by mouth. Provider, Historical   meloxicam (MOBIC) 7.5 mg tablet Take  by mouth daily. Provider, Historical   estradiol (ESTRACE) 0.5 mg tablet TAKE ONE TABLET BY MOUTH DAILY **PLEASE WEAN QUICKLY OR STOP THIS MEDICATION DUE TO CARDIOVASCULAR RISKS  Patient taking differently: Indications: not taking 4/20/19   Randi Vergara MD   amiodarone (CORDARONE) 200 mg tablet Take 100 mg by mouth daily. Provider, Historical   ALPRAZolam (XANAX) 1 mg tablet Take 1 mg by mouth as needed. 2/8/19   Provider, Historical   atenolol (TENORMIN) 25 mg tablet Take 1 Tab by mouth daily. 2/25/19   Erica Esquivel NP   eszopiclone (LUNESTA) 2 mg tablet Take 2 mg by mouth nightly. Provider, Historical   guaiFENesin ER (MUCINEX) 600 mg ER tablet Take 600 mg by mouth two (2) times daily as needed for Congestion. Provider, Historical   apixaban (ELIQUIS) 5 mg tablet Take 5 mg by mouth two (2) times a day. Provider, Historical   fluticasone (FLONASE) 50 mcg/actuation nasal spray 2 Sprays by Both Nostrils route daily as needed. 7/3/16   Provider, Historical   vitamin b comp & c no.4 (SUPER B COMPLEX + C) 150 mg tab Take 1 Tab by mouth daily. Provider, Historical   cholecalciferol, vitamin D3, (VITAMIN D3) 2,000 unit tab Take 2,000 Units by mouth daily. Provider, Historical   MULTIVITAMIN (MULTIPLE VITAMIN PO) Take 1 Tab by mouth daily.     Provider, Historical         Review of Systems:  (bold if positive, if negative)    Gen:  Eyes:  ENT:  CVS:  Pulm:  GI:   diarrhea,:  MS:  Skin:  Psych:  Endo:  Hem:  Renal:  Neuro:            Objective:      VITALS:    Vital signs reviewed; most recent are:    Visit Vitals  /84 (BP 1 Location: Left arm, BP Patient Position: At rest)   Pulse 71   Temp 98.1 °F (36.7 °C)   Resp 16   Ht 5' 7\" (1.702 m)   Wt 59 kg (130 lb)   SpO2 96%   BMI 20.36 kg/m²     SpO2 Readings from Last 6 Encounters:   10/21/20 96%   09/10/20 99%   01/02/20 95%   12/30/19 99%   12/14/19 97%   07/22/19 100%    O2 Flow Rate (L/min): 2 l/min   No intake or output data in the 24 hours ending 10/21/20 2009         Exam:     Physical Exam:    Gen:  Well-developed, well-nourished, in no acute distress  HEENT:  Pink conjunctivae, PERRL, hearing intact to voice, moist mucous membranes  Neck:  Supple, without masses, thyroid non-tender  Resp:  No accessory muscle use, clear breath sounds without wheezes rales or rhonchi  Card:  No murmurs, normal S1, S2 without thrills, bruits or peripheral edema  Abd:  Soft, non-tender, non-distended, normoactive bowel sounds are present, no palpable organomegaly and no detectable hernias  Lymph:  No cervical or inguinal adenopathy  Musc:  No cyanosis or clubbing  Skin:  No rashes or ulcers, skin turgor is good  Neuro:  Cranial nerves are grossly intact, no focal motor weakness, follows commands appropriately  Psych:  Good insight, oriented to person, place and time, alert       Labs:    Recent Labs     10/21/20  1840   WBC 2.4*   HGB 11.9   HCT 35.3   *     Recent Labs     10/21/20  1840   *   K 3.9   CL 97   CO2 28   *   BUN 11   CREA 0.79   CA 7.6*   MG 1.7   ALB 2.9*   TBILI 1.0   ALT 18     Lab Results   Component Value Date/Time    Glucose (POC) 275 (H) 02/16/2019 05:38 PM     No results for input(s): PH, PCO2, PO2, HCO3, FIO2 in the last 72 hours. Recent Labs     10/21/20  1840   INR 1.1       Telemetry reviewed:   normal sinus rhythm       Assessment/Plan:    1. Diarrhea/ Generalized body weakness/debility.   Admit to medical.  Patient is consulted of Covid 23 as she had contact with her son who was tested positive about 4 weeks ago. Her chest x-ray shows bilateral atelectasis, patient denies cough or shortness of breath. No fever. SARS-CoV-2 was sent in ER. We will also check VRP. Check a stool studies, IV fluids and monitor clinically. PT OT evaluation     2. Leukopenia (10/21/2020)/Thrombocytopenia ([address] (10/21/2020). [address]. Thrombocytopenia is chronic. Consult hematology    3. HTN (hypertension). BP is borderline low and will hold blood pressure medications    4. Chronic afib/ CAD (coronary artery disease). Continue Eliquis and atenolol if blood pressure allows    5. Hypothyroid. Continue Synthroid and check TSH    6. Anxiety. Continue Xanax and Paxil    7. Hyponatremia. likely secondary to volume depletion due to diarrhea. Continue IVF.      CODE STATUS: DNR(discussed with patient)    Previous medical records reviewed     Risk of deterioration: high      Total time spent with patient: [address] discussed with: Patient, Nursing Staff and >50% of time spent in counseling and coordination of care    Discussed:  Care Plan    Prophylaxis:  Eliquis    Probable Disposition:  Home w/Family           ___________________________________________________    Attending Physician: Kenn Potts MD no

## 2022-06-05 NOTE — DIETITIAN INITIAL EVALUATION ADULT - PERTINENT LABORATORY DATA
06-05    138  |  103  |  34<H>  ----------------------------<  92  3.5   |  30  |  1.28    Ca    8.9      05 Jun 2022 05:32  Mg     2.0     06-05    TPro  7.1  /  Alb  3.2<L>  /  TBili  1.6<H>  /  DBili  x   /  AST  36  /  ALT  34  /  AlkPhos  185<H>  06-04

## 2022-06-05 NOTE — PROGRESS NOTE ADULT - NUTRITIONAL ASSESSMENT
This patient has been assessed with a concern for Malnutrition and has been determined to have a diagnosis/diagnoses of Moderate protein-calorie malnutrition.    This patient is being managed with:   Diet DASH/TLC-  Sodium & Cholesterol Restricted  Entered: Jun 4 2022 11:20PM

## 2022-06-05 NOTE — H&P ADULT - RS GEN PE MLT RESP DETAILS PC
good air movement/respirations non-labored/clear to auscultation bilaterally/no intercostal retractions

## 2022-06-05 NOTE — PATIENT PROFILE ADULT - FALL HARM RISK - HARM RISK INTERVENTIONS

## 2022-06-06 ENCOUNTER — TRANSCRIPTION ENCOUNTER (OUTPATIENT)
Age: 87
End: 2022-06-06

## 2022-06-06 VITALS — DIASTOLIC BLOOD PRESSURE: 62 MMHG | SYSTOLIC BLOOD PRESSURE: 93 MMHG | HEART RATE: 75 BPM

## 2022-06-06 LAB
ANION GAP SERPL CALC-SCNC: 8 MMOL/L — SIGNIFICANT CHANGE UP (ref 5–17)
BUN SERPL-MCNC: 35 MG/DL — HIGH (ref 7–23)
CALCIUM SERPL-MCNC: 9 MG/DL — SIGNIFICANT CHANGE UP (ref 8.5–10.1)
CHLORIDE SERPL-SCNC: 103 MMOL/L — SIGNIFICANT CHANGE UP (ref 96–108)
CO2 SERPL-SCNC: 27 MMOL/L — SIGNIFICANT CHANGE UP (ref 22–31)
CREAT SERPL-MCNC: 1.22 MG/DL — SIGNIFICANT CHANGE UP (ref 0.5–1.3)
EGFR: 57 ML/MIN/1.73M2 — LOW
GLUCOSE SERPL-MCNC: 86 MG/DL — SIGNIFICANT CHANGE UP (ref 70–99)
MAGNESIUM SERPL-MCNC: 2.1 MG/DL — SIGNIFICANT CHANGE UP (ref 1.6–2.6)
NT-PROBNP SERPL-SCNC: HIGH PG/ML (ref 0–450)
PHOSPHATE SERPL-MCNC: 3.2 MG/DL — SIGNIFICANT CHANGE UP (ref 2.5–4.5)
POTASSIUM SERPL-MCNC: 3.7 MMOL/L — SIGNIFICANT CHANGE UP (ref 3.5–5.3)
POTASSIUM SERPL-SCNC: 3.7 MMOL/L — SIGNIFICANT CHANGE UP (ref 3.5–5.3)
SODIUM SERPL-SCNC: 138 MMOL/L — SIGNIFICANT CHANGE UP (ref 135–145)

## 2022-06-06 PROCEDURE — 99239 HOSP IP/OBS DSCHRG MGMT >30: CPT

## 2022-06-06 RX ORDER — ASPIRIN/CALCIUM CARB/MAGNESIUM 324 MG
1 TABLET ORAL
Qty: 0 | Refills: 0 | DISCHARGE

## 2022-06-06 RX ORDER — FUROSEMIDE 40 MG
1 TABLET ORAL
Qty: 16 | Refills: 3
Start: 2022-06-06

## 2022-06-06 RX ORDER — DAPAGLIFLOZIN 10 MG/1
1 TABLET, FILM COATED ORAL
Qty: 30 | Refills: 3
Start: 2022-06-06

## 2022-06-06 RX ORDER — ASPIRIN/CALCIUM CARB/MAGNESIUM 324 MG
1 TABLET ORAL
Qty: 30 | Refills: 3
Start: 2022-06-06

## 2022-06-06 RX ORDER — FUROSEMIDE 40 MG
1 TABLET ORAL
Qty: 0 | Refills: 0 | DISCHARGE

## 2022-06-06 RX ADMIN — Medication 325 MILLIGRAM(S): at 10:37

## 2022-06-06 RX ADMIN — PREGABALIN 1000 MICROGRAM(S): 225 CAPSULE ORAL at 10:38

## 2022-06-06 RX ADMIN — Medication 50 MICROGRAM(S): at 05:29

## 2022-06-06 RX ADMIN — Medication 100 MILLIGRAM(S): at 10:38

## 2022-06-06 RX ADMIN — AMIODARONE HYDROCHLORIDE 200 MILLIGRAM(S): 400 TABLET ORAL at 10:37

## 2022-06-06 RX ADMIN — DAPAGLIFLOZIN 10 MILLIGRAM(S): 10 TABLET, FILM COATED ORAL at 14:14

## 2022-06-06 RX ADMIN — Medication 1000 UNIT(S): at 10:37

## 2022-06-06 RX ADMIN — Medication 2 GRAM(S): at 10:38

## 2022-06-06 RX ADMIN — Medication 40 MILLIGRAM(S): at 10:39

## 2022-06-06 NOTE — DISCHARGE NOTE NURSING/CASE MANAGEMENT/SOCIAL WORK - PATIENT PORTAL LINK FT
You can access the FollowMyHealth Patient Portal offered by [facility] by registering at the following website: [email or web address]. By joining [facility]’s FollowMyHealth portal, you will also be able to view your health information using other applications (apps) compatible with our system.

## 2022-06-06 NOTE — DISCHARGE NOTE PROVIDER - PROVIDER TOKENS
PROVIDER:[TOKEN:[[id number]],SCHEDULEDAPPT:[06/09/2022],ESTABLISHEDPATIENT:[T]],PROVIDER:[TOKEN:[[id number]],FOLLOWUP:[2 weeks],ESTABLISHEDPATIENT:[T]]

## 2022-06-06 NOTE — DISCHARGE NOTE PROVIDER - HOSPITAL COURSE
Patient is an 88 year-old man with HTN, CAD, hx of MI, hx of vtach, CHF with reduced EF (25% 12/2020) s/p AICD, PPM,  hypothyroidism, gout and hx of prostate cancer s/p seed therapy, presented with worsening dyspnea, orthopnea and B/L lower extremity edema, and decreased activity tolerance despite home diuretic regimen of Lasix every other day. Found to have acute on chronic systolic CHF. Placed on IV Lasix and admitted to Medicine.    Acute on chronic systolic CHF  Clinically improved with IV diuresis. Appreciate input from Cardiology. No sign of acute coronary syndrome. No acute dysrhythmia. Now stable for DC home with daily Lasix, 40mg PO alternating with 20mg PO. Continue carvedilol. Hypotension has limited goal directed medical therapy (thus not on ACEI, ARB, Entresto etc). Outpatient follow up with Dr. Almanzar. Plan to add Farxiga or Jardiance. [facility] He and wife will arrange expedited follow up there.     CAD  No angina. EKG paced rhythm. Continue aspirin. Not on statin due to myalgia in past.    Hx of Vtach  On amiodarone.    Hx of gout  No sign of flare. Continue allopurinol.       Hypothyroidism  Stable. Continue levothyroxine      Discharge Physical Exam:  T(F): 98 (06 Jun 2022 07:01), Max: 98 (06 Jun 2022 07:01)  HR: 59 (06 Jun 2022 07:01) (59 - 77)  BP: 96/56 (06 Jun 2022 07:01) (85/52 - 96/56)  RR: 18 (06 Jun 2022 07:01) (18 - 19)  SpO2: 95% (06 Jun 2022 07:01) (94% - 97%)    Gen: Comfortable appearing on room air  HEENT: NCAT PERRL EOMI  Neck: Supple  Chest: CTA B/L, normal resp effort  CVS: S1 S2 normal RRR  Abd: Soft NT ND +BS  Ext: Trace B/L LE edema  Skin: Warm dry intact  Neuro: A+OX3, no gross deficits  Mood: Calm, pleasant    Labs:                        13.0   7.93 )-----------( 220                   40.0         138  |  103  |  35  ---------------------<  86  3.7   |   27   |  1.22    Ca 9.0  Phos 3.2  Mg 2.1    TPro  7.1  /  Alb  3.2  /  TBili  1.6  /  DBili  x   /  AST  36  /  ALT  34  /  AlkPhos  185    Imaging:  CXR: Lungs clear bilaterally. Small pleural effusion. No pneumothorax    Cardiac Testing:  EKG a-sensed v-paced

## 2022-06-06 NOTE — CONSULT NOTE ADULT - ASSESSMENT
88 M with CAD. and long hx of severely reduced EF with resultant HF, recently with increased CHF symptoms-- better after diuresis    CHF- severely reduced EF ( known) - continue with coreg BID- he had been on lasix 40 Q48 hrs- optimal treatment has been limited by renal function and hypotension    farxiga had been added yesterday, will monitor renal function and BNP today  recommend discharge on alternating lasix 40/ 20 mg alt days- if renal function stable and patient is at baseline ( currently feeling well)     CAD- continue with asa ( not on statin due to myalgias)- no evidence for ACS     VT- has ICD-- on amiodarone, followed by EP

## 2022-06-06 NOTE — DISCHARGE NOTE PROVIDER - NSDCCAREPROVSEEN_GEN_ALL_CORE_FT
Herson Almanzar (Cardiology)  Tyrel Mcgee ([facility] Medicine)  Joey Dasilva ([facility] Medicine)

## 2022-06-06 NOTE — DISCHARGE NOTE PROVIDER - DETAILS OF MALNUTRITION DIAGNOSIS/DIAGNOSES
This patient has been assessed with a concern for Malnutrition and was treated during this hospitalization for the following Nutrition diagnosis/diagnoses:     -  06/05/2022: Moderate protein-calorie malnutrition

## 2022-06-06 NOTE — DISCHARGE NOTE PROVIDER - NSDCCPCAREPLAN_GEN_ALL_CORE_FT
PRINCIPAL DISCHARGE DIAGNOSIS  Diagnosis: CHF exacerbation  Assessment and Plan of Treatment: You were admitted to the hospital with shortness of breath, Trouble laying flat, also leg swelling and decreased activity tolerance. these symptoms were due to an acute exacerbation of congestive heart failure despite your home diuretic regimen of furosemide every other day. You were seen and evaluated by Cardiology. You were treated with an intravenous diuretic and you have since improved. Now stable for discharge home. As per Cardiology Dr Almanzar, you will now continue on a daily dose of furosemide, 40mg alternating with 20mg. You are recommended to start Farxiga or Jardiance however [facility] will need [facility] if in agreement. [facility] with [facility] to [facility]. Follow up with Dr Almanzar as well. Continue carvedilol. Continue aspirin, now 81mg daily. Monitor your weight, at least twice weekly. Call Dr Almanzar or return to the hospital if you notice > 5lb weight gain in 1 week or > 3lbs in 2 days. Also call Dr Almanzar or return to hospital if you develop chest pain, trouble breathing, worsening swelling or other concerning complaints. Avoid added salt in diet. Try to limit fluid intake to 1.2 to 1.5 liters per day.

## 2022-06-06 NOTE — DISCHARGE NOTE PROVIDER - NSDCFUSCHEDAPPT_GEN_ALL_CORE_FT
Aris Jackson  [facility]  [facility] [address]  Scheduled Appointment: 06/09/2022    [facility]  [facility] [address]  Scheduled Appointment: 07/27/2022

## 2022-06-06 NOTE — DISCHARGE NOTE PROVIDER - NSDCMRMEDTOKEN_GEN_ALL_CORE_FT
acetaminophen 325 mg oral tablet: 2 tab(s) orally every 6 hours, As needed, Temp greater or equal to 38.5C (101.3F), Mild Pain (1 - 3)  allopurinol 100 mg oral tablet: 1 tab(s) orally 2 times a day  aspirin 325 mg oral tablet: 1 tab(s) orally once a day  carvedilol 6.25 mg oral tablet: 1.5 tab(s) orally 2 times a day  cyanocobalamin 1000 mcg oral tablet: 2 tab(s) orally once a day  dapagliflozin 10 mg oral tablet: 1 tab(s) orally every 24 hours  Fish Oil 1200 mg oral capsule: 1 cap(s) orally 3 times a day  furosemide 40 mg oral tablet: 1 tab(s) orally once a day  levothyroxine 50 mcg (0.05 mg) oral tablet: 1 tab(s) orally once a day  Pacerone 200 mg oral tablet: 1 tab(s) orally once a day  PreserVision AREDS 2 oral capsule: 1 cap(s) orally once a day  Vitamin D3 1000 intl units oral tablet: 1 tab(s) orally once a day   allopurinol 100 mg oral tablet: 1 tab(s) orally 2 times a day  Aspirin Enteric Coated 81 mg oral delayed release tablet: 1 tab(s) orally once a day   carvedilol 6.25 mg oral tablet: 1.5 tab(s) orally 2 times a day  cyanocobalamin 1000 mcg oral tablet: 2 tab(s) orally once a day  Fish Oil 1200 mg oral capsule: 1 cap(s) orally 3 times a day  furosemide 20 mg oral tablet: 1 tab(s) orally every other day, alternating with 40mg dose  furosemide 40 mg oral tablet: 1 tab(s) orally every other day, alternating with 20mg dose.   levothyroxine 50 mcg (0.05 mg) oral tablet: 1 tab(s) orally once a day  Pacerone 200 mg oral tablet: 1 tab(s) orally once a day  PreserVision AREDS 2 oral capsule: 1 cap(s) orally once a day  Vitamin D3 1000 intl units oral tablet: 1 tab(s) orally once a day

## 2022-06-06 NOTE — DISCHARGE NOTE PROVIDER - CARE PROVIDER_API CALL
Aris Jackson)  Cardiac Electrophysiology; Cardiovascular Disease  [address] [place] [address]  Phone: [phone]  Fax: [phone]  Established Patient  Scheduled Appointment: 06/09/2022    Herson Almanzar)  Cardiovascular Disease; Internal Medicine; Nuclear Cardiology  [address] [facility], [facility] [address]  [place] [address]  Phone: [phone]  Fax: [phone]  Established Patient  Follow Up Time: 2 weeks

## 2022-06-06 NOTE — PHARMACOTHERAPY INTERVENTION NOTE - COMMENTS
contacted [facility] co-pay for one month supply of Farxiga $560 - spoke to patient's wife at bedside pt does not have prescription insurance.  [facility] - [facility] has an appointment in July

## 2022-06-06 NOTE — CONSULT NOTE ADULT - SUBJECTIVE AND OBJECTIVE BOX
CHIEF COMPLAINT: Patient is a 88y old  Male who presents with a chief complaint of acute on chronic HFrEF (05 Jun 2022 19:17)      HPI:  88 year old male w hx CAD s/p MI age 54,  HFrEF 25, AICD/ PPM, hx VT, gout,  HTN, hypothyroid, prostate cancer who came to ED by EMS c/o increased SOB      Pt states has had increased SOB when walking up stairs x 3 days  denied lightheadedness  denied chest pain  denied SOB at rest  pt states wife insisted he go to ED for evaluation  was taking furosemide every other day    Ambulance call report reviewed    In /75   HR 85   RR 16   T 97.9   98%  lasix 20 mg IV    PAST MEDICAL HX  H/O ventricular tachycardia PPM placed 2008  HTN Hypertension   Hypothyroid  Myocardial infarction 54 years old  Prostate cancer    PAST SURGICAL HX  Elective surgery right middle finger surgery, no hardware  H/O pilonidal cyst   H/O prostate biopsy cancer treated with prostate seeding  History of tonsillectomy as child  Implantable cardioverter-defibrillator (ICD) in situ 2008.     FAMILY HX  FHx: breast cancer, mother  FHx: myocardial infarction, father.    SOCIAL HX    Lives at home  nonsmoker   (05 Jun 2022 03:08)      PMHx: PAST MEDICAL & SURGICAL HISTORY:  Hypertension      H/O ventricular tachycardia  PPM placed 2008      Myocardial infarction  54 years old      Prostate cancer      Implantable cardioverter-defibrillator (ICD) in situ  2008      History of tonsillectomy  as child      Elective surgery  right middle finger surgery, no hardware      H/O pilonidal cyst      H/O prostate biopsy  cancer treated with prostate seeding            Soc Hx: no toxic habits- lives at home with wife      Allergies: Allergies    dexamethasone (Hives)  mexiletine (Unknown)  statins (Rash)    Intolerances          Vital Signs Last 24 Hrs  T(C): 36.6 (06 Jun 2022 05:34), Max: 36.6 (06 Jun 2022 05:34)  T(F): 97.9 (06 Jun 2022 05:34), Max: 97.9 (06 Jun 2022 05:34)  HR: 77 (06 Jun 2022 05:34) (63 - 77)  BP: 92/57 (06 Jun 2022 05:34) (85/52 - 96/55)  BP(mean): 64 (05 Jun 2022 07:40) (64 - 64)  RR: 18 (06 Jun 2022 05:34) (18 - 19)  SpO2: 97% (06 Jun 2022 05:34) (94% - 97%)    I&O's Summary          PHYSICAL EXAM:   Constitutional: NAD, awake and alert, well-developed  HEENT: PERR, EOMI, Normal Hearing, MMM  Neck: Soft and supple, No LAD, No JVD  Respiratory: Breath sounds are clear bilaterally, No wheezing, rales or rhonchi  Cardiovascular: S1 and S2, regular rate and rhythm, no Murmurs, gallops or rubs  Gastrointestinal: Bowel Sounds present, soft, nontender, nondistended, no guarding, no rebound  Extremities: 1+ peripheral edema  Vascular: 2+ peripheral pulses  Neurological: A/O x 3, no focal deficits  Musculoskeletal: 5/5 strength b/l upper and lower extremities  Skin: No rashes    MEDICATIONS:  MEDICATIONS  (STANDING):  allopurinol 100 milliGRAM(s) Oral two times a day  aMIOdarone    Tablet 200 milliGRAM(s) Oral daily  aspirin 325 milliGRAM(s) Oral daily  carvedilol 9.375 milliGRAM(s) Oral every 12 hours  cholecalciferol 1000 Unit(s) Oral daily  cyanocobalamin 1000 MICROGram(s) Oral daily  dapagliflozin 10 milliGRAM(s) Oral every 24 hours  furosemide    Tablet 40 milliGRAM(s) Oral <User Schedule>  heparin   Injectable 5000 Unit(s) SubCutaneous every 12 hours  levothyroxine 50 MICROGram(s) Oral daily  omega-3-Acid Ethyl Esters 2 Gram(s) Oral two times a day      LABS: All Labs Reviewed:                        13.0   7.93  )-----------( 220      ( 04 Jun 2022 21:22 )             40.0     06-05    138  |  103  |  34<H>  ----------------------------<  92  3.5   |  30  |  1.28    Ca    8.9      05 Jun 2022 05:32  Mg     2.0     06-05    TPro  7.1  /  Alb  3.2<L>  /  TBili  1.6<H>  /  DBili  x   /  AST  36  /  ALT  34  /  AlkPhos  185<H>  06-04          Serum Pro-Brain Natriuretic Peptide: [id number] pg/mL (06-04 @ 21:22)      EKG:A senced, V paced     Telemetry: AV paced    ECHO:< from: TTE Echo Complete w/o Contrast w/ Doppler (12.17.20 @ 14:52) >   Impression     Summary     Moderate (2+) mitral regurgitation is present.   Mild mitral annular calcification is present.   Mild aortic sclerosis is present with normal valvular opening.   Tracepulmonic valvular regurgitation is present.   The left atrium is mildly dilated.   The left ventricle cavity is moderately dilated.   Estimated left ventricular ejection fraction is 20-25 %.   A device wire is seen in the RV and RA.   Normal appearing right ventricle structure and function.   No evidence of pericardial effusion.     Signature     ----------------------------------------------------------------   Electronically signed by Linda Blancas MD(Interpreting   physician) on 12/18/2020 09:15 AM   ----------------------------------------------------------------    < end of copied text >

## 2022-06-09 ENCOUNTER — APPOINTMENT (OUTPATIENT)
Dept: ELECTROPHYSIOLOGY | Facility: CLINIC | Age: 87
End: 2022-06-09
Payer: MEDICARE

## 2022-06-09 VITALS
RESPIRATION RATE: 15 BRPM | HEIGHT: 65 IN | BODY MASS INDEX: 25.66 KG/M2 | WEIGHT: 154 LBS | SYSTOLIC BLOOD PRESSURE: 94 MMHG | DIASTOLIC BLOOD PRESSURE: 62 MMHG

## 2022-06-09 VITALS — OXYGEN SATURATION: 98 % | HEART RATE: 76 BPM

## 2022-06-09 PROCEDURE — 93290 INTERROG DEV EVAL ICPMS IP: CPT | Mod: 26

## 2022-06-09 PROCEDURE — 93284 PRGRMG EVAL IMPLANTABLE DFB: CPT

## 2022-06-09 RX ORDER — ASPIRIN 325 MG/1
325 TABLET, FILM COATED ORAL DAILY
Refills: 0 | Status: DISCONTINUED | COMMUNITY
End: 2022-06-09

## 2022-06-10 DIAGNOSIS — Z88.8 ALLERGY STATUS TO OTHER DRUGS, MEDICAMENTS AND BIOLOGICAL SUBSTANCES STATUS: ICD-10-CM

## 2022-06-10 DIAGNOSIS — I50.23 ACUTE ON CHRONIC SYSTOLIC (CONGESTIVE) HEART FAILURE: ICD-10-CM

## 2022-06-10 DIAGNOSIS — I13.0 HYPERTENSIVE HEART AND CHRONIC KIDNEY DISEASE WITH HEART FAILURE AND STAGE 1 THROUGH STAGE 4 CHRONIC KIDNEY DISEASE, OR UNSPECIFIED CHRONIC KIDNEY DISEASE: ICD-10-CM

## 2022-06-10 DIAGNOSIS — Z79.899 OTHER LONG TERM (CURRENT) DRUG THERAPY: ICD-10-CM

## 2022-06-10 DIAGNOSIS — Z79.82 LONG TERM (CURRENT) USE OF ASPIRIN: ICD-10-CM

## 2022-06-10 DIAGNOSIS — Z79.02 LONG TERM (CURRENT) USE OF ANTITHROMBOTICS/ANTIPLATELETS: ICD-10-CM

## 2022-06-10 DIAGNOSIS — E03.9 HYPOTHYROIDISM, UNSPECIFIED: ICD-10-CM

## 2022-06-10 DIAGNOSIS — I25.10 ATHEROSCLEROTIC HEART DISEASE OF NATIVE CORONARY ARTERY WITHOUT ANGINA PECTORIS: ICD-10-CM

## 2022-06-10 DIAGNOSIS — Z95.810 PRESENCE OF AUTOMATIC (IMPLANTABLE) CARDIAC DEFIBRILLATOR: ICD-10-CM

## 2022-06-10 DIAGNOSIS — M10.9 GOUT, UNSPECIFIED: ICD-10-CM

## 2022-06-10 DIAGNOSIS — I47.2 VENTRICULAR TACHYCARDIA: ICD-10-CM

## 2022-06-10 DIAGNOSIS — Z79.890 HORMONE REPLACEMENT THERAPY: ICD-10-CM

## 2022-06-10 DIAGNOSIS — E78.5 HYPERLIPIDEMIA, UNSPECIFIED: ICD-10-CM

## 2022-06-10 DIAGNOSIS — E44.0 MODERATE PROTEIN-CALORIE MALNUTRITION: ICD-10-CM

## 2022-06-10 DIAGNOSIS — I25.2 OLD MYOCARDIAL INFARCTION: ICD-10-CM

## 2022-06-10 DIAGNOSIS — N18.30 CHRONIC KIDNEY DISEASE, STAGE 3 UNSPECIFIED: ICD-10-CM

## 2022-06-10 DIAGNOSIS — I95.89 OTHER HYPOTENSION: ICD-10-CM

## 2022-06-10 DIAGNOSIS — Z85.46 PERSONAL HISTORY OF MALIGNANT NEOPLASM OF PROSTATE: ICD-10-CM

## 2022-07-26 ENCOUNTER — APPOINTMENT (OUTPATIENT)
Dept: ELECTROPHYSIOLOGY | Facility: CLINIC | Age: 87
End: 2022-07-26

## 2022-07-27 ENCOUNTER — NON-APPOINTMENT (OUTPATIENT)
Age: 87
End: 2022-07-27

## 2022-07-27 PROCEDURE — 93295 DEV INTERROG REMOTE 1/2/MLT: CPT

## 2022-07-27 PROCEDURE — 93296 REM INTERROG EVL PM/IDS: CPT

## 2022-08-09 NOTE — H&P PST ADULT - TEACHING/LEARNING FACTORS INFLUENCE READINESS TO LEARN
Pt was ambulated with pulse ox. SpO2 on RA was 95% and HR was 84.       Gautam Foley RN  08/08/22 0559 none

## 2022-10-18 NOTE — PROVIDER CONTACT NOTE (OTHER) - ASSESSMENT
[Well Developed] : well developed Pt asymptomatic. Denies lightheadedness. [Well Nourished] : well nourished [No Acute Distress] : no acute distress [Normal Venous Pressure] : normal venous pressure [No Carotid Bruit] : no carotid bruit [Normal S1, S2] : normal S1, S2 [No Rub] : no rub [No Gallop] : no gallop [Clear Lung Fields] : clear lung fields [Good Air Entry] : good air entry [No Respiratory Distress] : no respiratory distress  [Soft] : abdomen soft [Non Tender] : non-tender [No Masses/organomegaly] : no masses/organomegaly [Normal Bowel Sounds] : normal bowel sounds [Normal Gait] : normal gait [No Edema] : no edema [No Cyanosis] : no cyanosis [No Clubbing] : no clubbing [No Varicosities] : no varicosities [No Rash] : no rash [No Skin Lesions] : no skin lesions [Moves all extremities] : moves all extremities [No Focal Deficits] : no focal deficits [Normal Speech] : normal speech [Alert and Oriented] : alert and oriented [Normal memory] : normal memory [General Appearance - Well Developed] : well developed [Normal Appearance] : normal appearance [Well Groomed] : well groomed [General Appearance - Well Nourished] : well nourished [No Deformities] : no deformities [General Appearance - In No Acute Distress] : no acute distress [Normal Conjunctiva] : the conjunctiva exhibited no abnormalities [Eyelids - No Xanthelasma] : the eyelids demonstrated no xanthelasmas [Normal Oral Mucosa] : normal oral mucosa [No Oral Pallor] : no oral pallor [No Oral Cyanosis] : no oral cyanosis [Normal Jugular Venous A Waves Present] : normal jugular venous A waves present [Normal Jugular Venous V Waves Present] : normal jugular venous V waves present [No Jugular Venous Oliveira A Waves] : no jugular venous oliveira A waves [Respiration, Rhythm And Depth] : normal respiratory rhythm and effort [Exaggerated Use Of Accessory Muscles For Inspiration] : no accessory muscle use [Auscultation Breath Sounds / Voice Sounds] : lungs were clear to auscultation bilaterally [Heart Rate And Rhythm] : heart rate and rhythm were normal [Heart Sounds] : normal S1 and S2 [Abdomen Soft] : soft [Abdomen Tenderness] : non-tender [Abdomen Mass (___ Cm)] : no abdominal mass palpated [Abnormal Walk] : normal gait [Gait - Sufficient For Exercise Testing] : the gait was sufficient for exercise testing [Nail Clubbing] : no clubbing of the fingernails [Cyanosis, Localized] : no localized cyanosis [Petechial Hemorrhages (___cm)] : no petechial hemorrhages [Skin Color & Pigmentation] : normal skin color and pigmentation [] : no rash [No Venous Stasis] : no venous stasis [Skin Lesions] : no skin lesions [No Skin Ulcers] : no skin ulcer [No Xanthoma] : no  xanthoma was observed [Oriented To Time, Place, And Person] : oriented to person, place, and time [Affect] : the affect was normal [Mood] : the mood was normal [No Anxiety] : not feeling anxious [de-identified] : 2/6 TOBY of MR [FreeTextEntry1] : 2/6 TOBY LSB

## 2022-10-25 ENCOUNTER — APPOINTMENT (OUTPATIENT)
Dept: ELECTROPHYSIOLOGY | Facility: CLINIC | Age: 87
End: 2022-10-25

## 2022-10-26 ENCOUNTER — NON-APPOINTMENT (OUTPATIENT)
Age: 87
End: 2022-10-26

## 2022-10-26 PROCEDURE — 93295 DEV INTERROG REMOTE 1/2/MLT: CPT

## 2022-10-26 PROCEDURE — 93296 REM INTERROG EVL PM/IDS: CPT

## 2022-12-13 ENCOUNTER — NON-APPOINTMENT (OUTPATIENT)
Age: 87
End: 2022-12-13

## 2022-12-13 ENCOUNTER — APPOINTMENT (OUTPATIENT)
Dept: ELECTROPHYSIOLOGY | Facility: CLINIC | Age: 87
End: 2022-12-13

## 2022-12-13 VITALS
OXYGEN SATURATION: 98 % | SYSTOLIC BLOOD PRESSURE: 95 MMHG | BODY MASS INDEX: 23.32 KG/M2 | DIASTOLIC BLOOD PRESSURE: 57 MMHG | WEIGHT: 140 LBS | HEART RATE: 83 BPM | HEIGHT: 65 IN

## 2022-12-13 PROCEDURE — 93283 PRGRMG EVAL IMPLANTABLE DFB: CPT

## 2022-12-13 RX ORDER — ALPRAZOLAM 0.25 MG/1
0.25 TABLET ORAL DAILY
Qty: 30 | Refills: 0 | Status: DISCONTINUED | COMMUNITY
Start: 2022-06-09 | End: 2022-12-13

## 2023-01-01 ENCOUNTER — APPOINTMENT (OUTPATIENT)
Dept: ELECTROPHYSIOLOGY | Facility: CLINIC | Age: 88
End: 2023-01-01

## 2023-01-01 ENCOUNTER — TRANSCRIPTION ENCOUNTER (OUTPATIENT)
Age: 88
End: 2023-01-01

## 2023-01-01 ENCOUNTER — APPOINTMENT (OUTPATIENT)
Dept: ELECTROPHYSIOLOGY | Facility: CLINIC | Age: 88
End: 2023-01-01
Payer: MEDICARE

## 2023-01-01 ENCOUNTER — INPATIENT (INPATIENT)
Facility: HOSPITAL | Age: 88
LOS: 1 days | Discharge: HOME HEALTH | DRG: 291 | End: 2023-12-06
Attending: HOSPITALIST | Admitting: FAMILY MEDICINE
Payer: MEDICARE

## 2023-01-01 ENCOUNTER — APPOINTMENT (OUTPATIENT)
Dept: CARE COORDINATION | Facility: HOME HEALTH | Age: 88
End: 2023-01-01
Payer: MEDICARE

## 2023-01-01 ENCOUNTER — NON-APPOINTMENT (OUTPATIENT)
Age: 88
End: 2023-01-01

## 2023-01-01 VITALS
BODY MASS INDEX: 25.48 KG/M2 | HEART RATE: 68 BPM | SYSTOLIC BLOOD PRESSURE: 86 MMHG | RESPIRATION RATE: 16 BRPM | WEIGHT: 153.13 LBS | OXYGEN SATURATION: 95 % | DIASTOLIC BLOOD PRESSURE: 48 MMHG

## 2023-01-01 VITALS
WEIGHT: 136 LBS | BODY MASS INDEX: 22.66 KG/M2 | RESPIRATION RATE: 16 BRPM | HEIGHT: 65 IN | HEART RATE: 83 BPM | OXYGEN SATURATION: 98 % | DIASTOLIC BLOOD PRESSURE: 60 MMHG | SYSTOLIC BLOOD PRESSURE: 90 MMHG

## 2023-01-01 VITALS — SYSTOLIC BLOOD PRESSURE: 92 MMHG | HEART RATE: 60 BPM | DIASTOLIC BLOOD PRESSURE: 49 MMHG

## 2023-01-01 VITALS — HEIGHT: 68 IN | WEIGHT: 154.1 LBS

## 2023-01-01 DIAGNOSIS — E03.9 HYPOTHYROIDISM, UNSPECIFIED: ICD-10-CM

## 2023-01-01 DIAGNOSIS — Z80.3 FAMILY HISTORY OF MALIGNANT NEOPLASM OF BREAST: ICD-10-CM

## 2023-01-01 DIAGNOSIS — Z79.82 LONG TERM (CURRENT) USE OF ASPIRIN: ICD-10-CM

## 2023-01-01 DIAGNOSIS — R41.0 DISORIENTATION, UNSPECIFIED: ICD-10-CM

## 2023-01-01 DIAGNOSIS — Z82.49 FAMILY HISTORY OF ISCHEMIC HEART DISEASE AND OTHER DISEASES OF THE CIRCULATORY SYSTEM: ICD-10-CM

## 2023-01-01 DIAGNOSIS — I25.2 OLD MYOCARDIAL INFARCTION: ICD-10-CM

## 2023-01-01 DIAGNOSIS — F41.9 ANXIETY DISORDER, UNSPECIFIED: ICD-10-CM

## 2023-01-01 DIAGNOSIS — I95.9 HYPOTENSION, UNSPECIFIED: ICD-10-CM

## 2023-01-01 DIAGNOSIS — M10.9 GOUT, UNSPECIFIED: ICD-10-CM

## 2023-01-01 DIAGNOSIS — I50.22 CHRONIC SYSTOLIC (CONGESTIVE) HEART FAILURE: ICD-10-CM

## 2023-01-01 DIAGNOSIS — I70.0 ATHEROSCLEROSIS OF AORTA: ICD-10-CM

## 2023-01-01 DIAGNOSIS — Z41.9 ENCOUNTER FOR PROCEDURE FOR PURPOSES OTHER THAN REMEDYING HEALTH STATE, UNSPECIFIED: Chronic | ICD-10-CM

## 2023-01-01 DIAGNOSIS — Z87.2 PERSONAL HISTORY OF DISEASES OF THE SKIN AND SUBCUTANEOUS TISSUE: Chronic | ICD-10-CM

## 2023-01-01 DIAGNOSIS — I08.1 RHEUMATIC DISORDERS OF BOTH MITRAL AND TRICUSPID VALVES: ICD-10-CM

## 2023-01-01 DIAGNOSIS — Z98.890 OTHER SPECIFIED POSTPROCEDURAL STATES: Chronic | ICD-10-CM

## 2023-01-01 DIAGNOSIS — Z95.810 PRESENCE OF AUTOMATIC (IMPLANTABLE) CARDIAC DEFIBRILLATOR: ICD-10-CM

## 2023-01-01 DIAGNOSIS — I27.20 PULMONARY HYPERTENSION, UNSPECIFIED: ICD-10-CM

## 2023-01-01 DIAGNOSIS — I11.0 HYPERTENSIVE HEART DISEASE WITH HEART FAILURE: ICD-10-CM

## 2023-01-01 DIAGNOSIS — I50.23 ACUTE ON CHRONIC SYSTOLIC (CONGESTIVE) HEART FAILURE: ICD-10-CM

## 2023-01-01 DIAGNOSIS — R06.02 SHORTNESS OF BREATH: ICD-10-CM

## 2023-01-01 DIAGNOSIS — Z88.8 ALLERGY STATUS TO OTHER DRUGS, MEDICAMENTS AND BIOLOGICAL SUBSTANCES: ICD-10-CM

## 2023-01-01 DIAGNOSIS — Z85.46 PERSONAL HISTORY OF MALIGNANT NEOPLASM OF PROSTATE: ICD-10-CM

## 2023-01-01 DIAGNOSIS — Z86.79 PERSONAL HISTORY OF OTHER DISEASES OF THE CIRCULATORY SYSTEM: ICD-10-CM

## 2023-01-01 DIAGNOSIS — N17.9 ACUTE KIDNEY FAILURE, UNSPECIFIED: ICD-10-CM

## 2023-01-01 DIAGNOSIS — Z90.89 ACQUIRED ABSENCE OF OTHER ORGANS: Chronic | ICD-10-CM

## 2023-01-01 DIAGNOSIS — I25.10 ATHEROSCLEROTIC HEART DISEASE OF NATIVE CORONARY ARTERY WITHOUT ANGINA PECTORIS: ICD-10-CM

## 2023-01-01 DIAGNOSIS — E87.70 FLUID OVERLOAD, UNSPECIFIED: ICD-10-CM

## 2023-01-01 DIAGNOSIS — Z95.810 PRESENCE OF AUTOMATIC (IMPLANTABLE) CARDIAC DEFIBRILLATOR: Chronic | ICD-10-CM

## 2023-01-01 LAB
ADD ON TEST-SPECIMEN IN LAB: SIGNIFICANT CHANGE UP
ADD ON TEST-SPECIMEN IN LAB: SIGNIFICANT CHANGE UP
ALBUMIN SERPL ELPH-MCNC: 2.8 G/DL — LOW (ref 3.3–5)
ALBUMIN SERPL ELPH-MCNC: 2.8 G/DL — LOW (ref 3.3–5)
ALP SERPL-CCNC: 157 U/L — HIGH (ref 40–120)
ALP SERPL-CCNC: 157 U/L — HIGH (ref 40–120)
ALT FLD-CCNC: 31 U/L — SIGNIFICANT CHANGE UP (ref 12–78)
ALT FLD-CCNC: 31 U/L — SIGNIFICANT CHANGE UP (ref 12–78)
ANION GAP SERPL CALC-SCNC: 5 MMOL/L — SIGNIFICANT CHANGE UP (ref 5–17)
ANION GAP SERPL CALC-SCNC: 5 MMOL/L — SIGNIFICANT CHANGE UP (ref 5–17)
ANION GAP SERPL CALC-SCNC: 7 MMOL/L — SIGNIFICANT CHANGE UP (ref 5–17)
ANION GAP SERPL CALC-SCNC: 7 MMOL/L — SIGNIFICANT CHANGE UP (ref 5–17)
ANION GAP SERPL CALC-SCNC: 8 MMOL/L — SIGNIFICANT CHANGE UP (ref 5–17)
ANION GAP SERPL CALC-SCNC: 8 MMOL/L — SIGNIFICANT CHANGE UP (ref 5–17)
AST SERPL-CCNC: 34 U/L — SIGNIFICANT CHANGE UP (ref 15–37)
AST SERPL-CCNC: 34 U/L — SIGNIFICANT CHANGE UP (ref 15–37)
BASOPHILS # BLD AUTO: 0.07 K/UL — SIGNIFICANT CHANGE UP (ref 0–0.2)
BASOPHILS # BLD AUTO: 0.07 K/UL — SIGNIFICANT CHANGE UP (ref 0–0.2)
BASOPHILS NFR BLD AUTO: 0.8 % — SIGNIFICANT CHANGE UP (ref 0–2)
BASOPHILS NFR BLD AUTO: 0.8 % — SIGNIFICANT CHANGE UP (ref 0–2)
BILIRUB SERPL-MCNC: 2 MG/DL — HIGH (ref 0.2–1.2)
BILIRUB SERPL-MCNC: 2 MG/DL — HIGH (ref 0.2–1.2)
BUN SERPL-MCNC: 47 MG/DL — HIGH (ref 7–23)
BUN SERPL-MCNC: 49 MG/DL — HIGH (ref 7–23)
BUN SERPL-MCNC: 49 MG/DL — HIGH (ref 7–23)
CALCIUM SERPL-MCNC: 8.7 MG/DL — SIGNIFICANT CHANGE UP (ref 8.5–10.1)
CALCIUM SERPL-MCNC: 8.7 MG/DL — SIGNIFICANT CHANGE UP (ref 8.5–10.1)
CALCIUM SERPL-MCNC: 8.8 MG/DL — SIGNIFICANT CHANGE UP (ref 8.5–10.1)
CALCIUM SERPL-MCNC: 8.8 MG/DL — SIGNIFICANT CHANGE UP (ref 8.5–10.1)
CALCIUM SERPL-MCNC: 9 MG/DL — SIGNIFICANT CHANGE UP (ref 8.5–10.1)
CALCIUM SERPL-MCNC: 9 MG/DL — SIGNIFICANT CHANGE UP (ref 8.5–10.1)
CHLORIDE SERPL-SCNC: 101 MMOL/L — SIGNIFICANT CHANGE UP (ref 96–108)
CHLORIDE SERPL-SCNC: 101 MMOL/L — SIGNIFICANT CHANGE UP (ref 96–108)
CHLORIDE SERPL-SCNC: 102 MMOL/L — SIGNIFICANT CHANGE UP (ref 96–108)
CHLORIDE SERPL-SCNC: 102 MMOL/L — SIGNIFICANT CHANGE UP (ref 96–108)
CHLORIDE SERPL-SCNC: 104 MMOL/L — SIGNIFICANT CHANGE UP (ref 96–108)
CHLORIDE SERPL-SCNC: 104 MMOL/L — SIGNIFICANT CHANGE UP (ref 96–108)
CO2 SERPL-SCNC: 29 MMOL/L — SIGNIFICANT CHANGE UP (ref 22–31)
CO2 SERPL-SCNC: 29 MMOL/L — SIGNIFICANT CHANGE UP (ref 22–31)
CO2 SERPL-SCNC: 30 MMOL/L — SIGNIFICANT CHANGE UP (ref 22–31)
CREAT SERPL-MCNC: 1.94 MG/DL — HIGH (ref 0.5–1.3)
CREAT SERPL-MCNC: 1.94 MG/DL — HIGH (ref 0.5–1.3)
CREAT SERPL-MCNC: 1.99 MG/DL — HIGH (ref 0.5–1.3)
CREAT SERPL-MCNC: 1.99 MG/DL — HIGH (ref 0.5–1.3)
CREAT SERPL-MCNC: 2.06 MG/DL — HIGH (ref 0.5–1.3)
CREAT SERPL-MCNC: 2.06 MG/DL — HIGH (ref 0.5–1.3)
EGFR: 30 ML/MIN/1.73M2 — LOW
EGFR: 30 ML/MIN/1.73M2 — LOW
EGFR: 32 ML/MIN/1.73M2 — LOW
EOSINOPHIL # BLD AUTO: 0.28 K/UL — SIGNIFICANT CHANGE UP (ref 0–0.5)
EOSINOPHIL # BLD AUTO: 0.28 K/UL — SIGNIFICANT CHANGE UP (ref 0–0.5)
EOSINOPHIL NFR BLD AUTO: 3.2 % — SIGNIFICANT CHANGE UP (ref 0–6)
EOSINOPHIL NFR BLD AUTO: 3.2 % — SIGNIFICANT CHANGE UP (ref 0–6)
GLUCOSE SERPL-MCNC: 104 MG/DL — HIGH (ref 70–99)
GLUCOSE SERPL-MCNC: 104 MG/DL — HIGH (ref 70–99)
GLUCOSE SERPL-MCNC: 106 MG/DL — HIGH (ref 70–99)
GLUCOSE SERPL-MCNC: 106 MG/DL — HIGH (ref 70–99)
GLUCOSE SERPL-MCNC: 109 MG/DL — HIGH (ref 70–99)
GLUCOSE SERPL-MCNC: 109 MG/DL — HIGH (ref 70–99)
HCT VFR BLD CALC: 36.6 % — LOW (ref 39–50)
HCT VFR BLD CALC: 36.6 % — LOW (ref 39–50)
HCT VFR BLD CALC: 38 % — LOW (ref 39–50)
HCT VFR BLD CALC: 38 % — LOW (ref 39–50)
HGB BLD-MCNC: 12.3 G/DL — LOW (ref 13–17)
HGB BLD-MCNC: 12.3 G/DL — LOW (ref 13–17)
HGB BLD-MCNC: 12.5 G/DL — LOW (ref 13–17)
HGB BLD-MCNC: 12.5 G/DL — LOW (ref 13–17)
IMM GRANULOCYTES NFR BLD AUTO: 0.5 % — SIGNIFICANT CHANGE UP (ref 0–0.9)
IMM GRANULOCYTES NFR BLD AUTO: 0.5 % — SIGNIFICANT CHANGE UP (ref 0–0.9)
LYMPHOCYTES # BLD AUTO: 0.91 K/UL — LOW (ref 1–3.3)
LYMPHOCYTES # BLD AUTO: 0.91 K/UL — LOW (ref 1–3.3)
LYMPHOCYTES # BLD AUTO: 10.4 % — LOW (ref 13–44)
LYMPHOCYTES # BLD AUTO: 10.4 % — LOW (ref 13–44)
MAGNESIUM SERPL-MCNC: 2.5 MG/DL — SIGNIFICANT CHANGE UP (ref 1.6–2.6)
MAGNESIUM SERPL-MCNC: 2.5 MG/DL — SIGNIFICANT CHANGE UP (ref 1.6–2.6)
MAGNESIUM SERPL-MCNC: 2.6 MG/DL — SIGNIFICANT CHANGE UP (ref 1.6–2.6)
MAGNESIUM SERPL-MCNC: 2.6 MG/DL — SIGNIFICANT CHANGE UP (ref 1.6–2.6)
MCHC RBC-ENTMCNC: 29.7 PG — SIGNIFICANT CHANGE UP (ref 27–34)
MCHC RBC-ENTMCNC: 29.7 PG — SIGNIFICANT CHANGE UP (ref 27–34)
MCHC RBC-ENTMCNC: 29.9 PG — SIGNIFICANT CHANGE UP (ref 27–34)
MCHC RBC-ENTMCNC: 29.9 PG — SIGNIFICANT CHANGE UP (ref 27–34)
MCHC RBC-ENTMCNC: 32.9 GM/DL — SIGNIFICANT CHANGE UP (ref 32–36)
MCHC RBC-ENTMCNC: 32.9 GM/DL — SIGNIFICANT CHANGE UP (ref 32–36)
MCHC RBC-ENTMCNC: 33.6 GM/DL — SIGNIFICANT CHANGE UP (ref 32–36)
MCHC RBC-ENTMCNC: 33.6 GM/DL — SIGNIFICANT CHANGE UP (ref 32–36)
MCV RBC AUTO: 89.1 FL — SIGNIFICANT CHANGE UP (ref 80–100)
MCV RBC AUTO: 89.1 FL — SIGNIFICANT CHANGE UP (ref 80–100)
MCV RBC AUTO: 90.3 FL — SIGNIFICANT CHANGE UP (ref 80–100)
MCV RBC AUTO: 90.3 FL — SIGNIFICANT CHANGE UP (ref 80–100)
MONOCYTES # BLD AUTO: 0.89 K/UL — SIGNIFICANT CHANGE UP (ref 0–0.9)
MONOCYTES # BLD AUTO: 0.89 K/UL — SIGNIFICANT CHANGE UP (ref 0–0.9)
MONOCYTES NFR BLD AUTO: 10.2 % — SIGNIFICANT CHANGE UP (ref 2–14)
MONOCYTES NFR BLD AUTO: 10.2 % — SIGNIFICANT CHANGE UP (ref 2–14)
NEUTROPHILS # BLD AUTO: 6.55 K/UL — SIGNIFICANT CHANGE UP (ref 1.8–7.4)
NEUTROPHILS # BLD AUTO: 6.55 K/UL — SIGNIFICANT CHANGE UP (ref 1.8–7.4)
NEUTROPHILS NFR BLD AUTO: 74.9 % — SIGNIFICANT CHANGE UP (ref 43–77)
NEUTROPHILS NFR BLD AUTO: 74.9 % — SIGNIFICANT CHANGE UP (ref 43–77)
NT-PROBNP SERPL-SCNC: HIGH PG/ML (ref 0–450)
PHOSPHATE SERPL-MCNC: 2.9 MG/DL — SIGNIFICANT CHANGE UP (ref 2.5–4.5)
PHOSPHATE SERPL-MCNC: 2.9 MG/DL — SIGNIFICANT CHANGE UP (ref 2.5–4.5)
PHOSPHATE SERPL-MCNC: 3.4 MG/DL — SIGNIFICANT CHANGE UP (ref 2.5–4.5)
PHOSPHATE SERPL-MCNC: 3.4 MG/DL — SIGNIFICANT CHANGE UP (ref 2.5–4.5)
PLATELET # BLD AUTO: 196 K/UL — SIGNIFICANT CHANGE UP (ref 150–400)
PLATELET # BLD AUTO: 196 K/UL — SIGNIFICANT CHANGE UP (ref 150–400)
PLATELET # BLD AUTO: 207 K/UL — SIGNIFICANT CHANGE UP (ref 150–400)
PLATELET # BLD AUTO: 207 K/UL — SIGNIFICANT CHANGE UP (ref 150–400)
POTASSIUM SERPL-MCNC: 3.7 MMOL/L — SIGNIFICANT CHANGE UP (ref 3.5–5.3)
POTASSIUM SERPL-MCNC: 3.7 MMOL/L — SIGNIFICANT CHANGE UP (ref 3.5–5.3)
POTASSIUM SERPL-MCNC: 3.8 MMOL/L — SIGNIFICANT CHANGE UP (ref 3.5–5.3)
POTASSIUM SERPL-MCNC: 3.8 MMOL/L — SIGNIFICANT CHANGE UP (ref 3.5–5.3)
POTASSIUM SERPL-MCNC: 4.1 MMOL/L — SIGNIFICANT CHANGE UP (ref 3.5–5.3)
POTASSIUM SERPL-MCNC: 4.1 MMOL/L — SIGNIFICANT CHANGE UP (ref 3.5–5.3)
POTASSIUM SERPL-SCNC: 3.7 MMOL/L — SIGNIFICANT CHANGE UP (ref 3.5–5.3)
POTASSIUM SERPL-SCNC: 3.7 MMOL/L — SIGNIFICANT CHANGE UP (ref 3.5–5.3)
POTASSIUM SERPL-SCNC: 3.8 MMOL/L — SIGNIFICANT CHANGE UP (ref 3.5–5.3)
POTASSIUM SERPL-SCNC: 3.8 MMOL/L — SIGNIFICANT CHANGE UP (ref 3.5–5.3)
POTASSIUM SERPL-SCNC: 4.1 MMOL/L — SIGNIFICANT CHANGE UP (ref 3.5–5.3)
POTASSIUM SERPL-SCNC: 4.1 MMOL/L — SIGNIFICANT CHANGE UP (ref 3.5–5.3)
PROT SERPL-MCNC: 6.4 GM/DL — SIGNIFICANT CHANGE UP (ref 6–8.3)
PROT SERPL-MCNC: 6.4 GM/DL — SIGNIFICANT CHANGE UP (ref 6–8.3)
RBC # BLD: 4.11 M/UL — LOW (ref 4.2–5.8)
RBC # BLD: 4.11 M/UL — LOW (ref 4.2–5.8)
RBC # BLD: 4.21 M/UL — SIGNIFICANT CHANGE UP (ref 4.2–5.8)
RBC # BLD: 4.21 M/UL — SIGNIFICANT CHANGE UP (ref 4.2–5.8)
RBC # FLD: 15.9 % — HIGH (ref 10.3–14.5)
RBC # FLD: 15.9 % — HIGH (ref 10.3–14.5)
RBC # FLD: 16 % — HIGH (ref 10.3–14.5)
RBC # FLD: 16 % — HIGH (ref 10.3–14.5)
SODIUM SERPL-SCNC: 136 MMOL/L — SIGNIFICANT CHANGE UP (ref 135–145)
SODIUM SERPL-SCNC: 136 MMOL/L — SIGNIFICANT CHANGE UP (ref 135–145)
SODIUM SERPL-SCNC: 140 MMOL/L — SIGNIFICANT CHANGE UP (ref 135–145)
TROPONIN I, HIGH SENSITIVITY RESULT: 20.01 NG/L — SIGNIFICANT CHANGE UP
TROPONIN I, HIGH SENSITIVITY RESULT: 20.01 NG/L — SIGNIFICANT CHANGE UP
WBC # BLD: 7.98 K/UL — SIGNIFICANT CHANGE UP (ref 3.8–10.5)
WBC # BLD: 7.98 K/UL — SIGNIFICANT CHANGE UP (ref 3.8–10.5)
WBC # BLD: 8.74 K/UL — SIGNIFICANT CHANGE UP (ref 3.8–10.5)
WBC # BLD: 8.74 K/UL — SIGNIFICANT CHANGE UP (ref 3.8–10.5)
WBC # FLD AUTO: 7.98 K/UL — SIGNIFICANT CHANGE UP (ref 3.8–10.5)
WBC # FLD AUTO: 7.98 K/UL — SIGNIFICANT CHANGE UP (ref 3.8–10.5)
WBC # FLD AUTO: 8.74 K/UL — SIGNIFICANT CHANGE UP (ref 3.8–10.5)
WBC # FLD AUTO: 8.74 K/UL — SIGNIFICANT CHANGE UP (ref 3.8–10.5)

## 2023-01-01 PROCEDURE — 97116 GAIT TRAINING THERAPY: CPT | Mod: GP

## 2023-01-01 PROCEDURE — 83880 ASSAY OF NATRIURETIC PEPTIDE: CPT

## 2023-01-01 PROCEDURE — 99285 EMERGENCY DEPT VISIT HI MDM: CPT

## 2023-01-01 PROCEDURE — 93306 TTE W/DOPPLER COMPLETE: CPT | Mod: 26

## 2023-01-01 PROCEDURE — 97162 PT EVAL MOD COMPLEX 30 MIN: CPT | Mod: GP

## 2023-01-01 PROCEDURE — 83735 ASSAY OF MAGNESIUM: CPT

## 2023-01-01 PROCEDURE — 99497 ADVNCD CARE PLAN 30 MIN: CPT | Mod: 25

## 2023-01-01 PROCEDURE — 84100 ASSAY OF PHOSPHORUS: CPT

## 2023-01-01 PROCEDURE — 99222 1ST HOSP IP/OBS MODERATE 55: CPT

## 2023-01-01 PROCEDURE — 36415 COLL VENOUS BLD VENIPUNCTURE: CPT

## 2023-01-01 PROCEDURE — 99498 ADVNCD CARE PLAN ADDL 30 MIN: CPT

## 2023-01-01 PROCEDURE — 93295 DEV INTERROG REMOTE 1/2/MLT: CPT

## 2023-01-01 PROCEDURE — 99233 SBSQ HOSP IP/OBS HIGH 50: CPT

## 2023-01-01 PROCEDURE — 93010 ELECTROCARDIOGRAM REPORT: CPT

## 2023-01-01 PROCEDURE — 99497 ADVNCD CARE PLAN 30 MIN: CPT

## 2023-01-01 PROCEDURE — 99239 HOSP IP/OBS DSCHRG MGMT >30: CPT

## 2023-01-01 PROCEDURE — 71045 X-RAY EXAM CHEST 1 VIEW: CPT | Mod: 26

## 2023-01-01 PROCEDURE — 93296 REM INTERROG EVL PM/IDS: CPT

## 2023-01-01 PROCEDURE — 93005 ELECTROCARDIOGRAM TRACING: CPT

## 2023-01-01 PROCEDURE — 93306 TTE W/DOPPLER COMPLETE: CPT

## 2023-01-01 PROCEDURE — 99495 TRANSJ CARE MGMT MOD F2F 14D: CPT

## 2023-01-01 PROCEDURE — 85027 COMPLETE CBC AUTOMATED: CPT

## 2023-01-01 PROCEDURE — 80048 BASIC METABOLIC PNL TOTAL CA: CPT

## 2023-01-01 PROCEDURE — 99223 1ST HOSP IP/OBS HIGH 75: CPT

## 2023-01-01 PROCEDURE — 93283 PRGRMG EVAL IMPLANTABLE DFB: CPT

## 2023-01-01 RX ORDER — AMIODARONE HYDROCHLORIDE 400 MG/1
200 TABLET ORAL DAILY
Refills: 0 | Status: DISCONTINUED | OUTPATIENT
Start: 2023-01-01 | End: 2023-01-01

## 2023-01-01 RX ORDER — MULTIVIT-MIN/FERROUS GLUCONATE 9 MG/15 ML
1 LIQUID (ML) ORAL
Qty: 0 | Refills: 0 | DISCHARGE

## 2023-01-01 RX ORDER — FUROSEMIDE 40 MG
40 TABLET ORAL ONCE
Refills: 0 | Status: DISCONTINUED | OUTPATIENT
Start: 2023-01-01 | End: 2023-01-01

## 2023-01-01 RX ORDER — CARVEDILOL PHOSPHATE 80 MG/1
1.5 CAPSULE, EXTENDED RELEASE ORAL
Refills: 0 | DISCHARGE

## 2023-01-01 RX ORDER — PREGABALIN 225 MG/1
1000 CAPSULE ORAL DAILY
Refills: 0 | Status: DISCONTINUED | OUTPATIENT
Start: 2023-01-01 | End: 2023-01-01

## 2023-01-01 RX ORDER — ZOLPIDEM TARTRATE 10 MG/1
2.5 TABLET ORAL ONCE
Refills: 0 | Status: DISCONTINUED | OUTPATIENT
Start: 2023-01-01 | End: 2023-01-01

## 2023-01-01 RX ORDER — ASPIRIN/CALCIUM CARB/MAGNESIUM 324 MG
81 TABLET ORAL DAILY
Refills: 0 | Status: DISCONTINUED | OUTPATIENT
Start: 2023-01-01 | End: 2023-01-01

## 2023-01-01 RX ORDER — LEVOTHYROXINE SODIUM 125 MCG
1 TABLET ORAL
Refills: 0 | DISCHARGE

## 2023-01-01 RX ORDER — CARVEDILOL 6.25 MG/1
6.25 TABLET, FILM COATED ORAL
Refills: 0 | Status: ACTIVE | COMMUNITY

## 2023-01-01 RX ORDER — MIDODRINE HYDROCHLORIDE 2.5 MG/1
2.5 TABLET ORAL THREE TIMES A DAY
Refills: 0 | Status: DISCONTINUED | OUTPATIENT
Start: 2023-01-01 | End: 2023-01-01

## 2023-01-01 RX ORDER — HEPARIN SODIUM 5000 [USP'U]/ML
5000 INJECTION INTRAVENOUS; SUBCUTANEOUS EVERY 12 HOURS
Refills: 0 | Status: DISCONTINUED | OUTPATIENT
Start: 2023-01-01 | End: 2023-01-01

## 2023-01-01 RX ORDER — CHOLECALCIFEROL (VITAMIN D3) 125 MCG
1 CAPSULE ORAL
Qty: 0 | Refills: 0 | DISCHARGE

## 2023-01-01 RX ORDER — ALPRAZOLAM 0.25 MG
0.25 TABLET ORAL
Refills: 0 | Status: DISCONTINUED | OUTPATIENT
Start: 2023-01-01 | End: 2023-01-01

## 2023-01-01 RX ORDER — DAPAGLIFLOZIN 10 MG/1
1 TABLET, FILM COATED ORAL
Refills: 0 | DISCHARGE

## 2023-01-01 RX ORDER — FUROSEMIDE 40 MG
1 TABLET ORAL
Refills: 0 | DISCHARGE

## 2023-01-01 RX ORDER — ISOSORBIDE DINITRATE 5 MG/1
1 TABLET ORAL
Refills: 0 | DISCHARGE

## 2023-01-01 RX ORDER — CARVEDILOL PHOSPHATE 80 MG/1
9.38 CAPSULE, EXTENDED RELEASE ORAL EVERY 12 HOURS
Refills: 0 | Status: DISCONTINUED | OUTPATIENT
Start: 2023-01-01 | End: 2023-01-01

## 2023-01-01 RX ORDER — AMIODARONE HYDROCHLORIDE 200 MG/1
200 TABLET ORAL
Qty: 90 | Refills: 1 | Status: ACTIVE | COMMUNITY

## 2023-01-01 RX ORDER — AMIODARONE HYDROCHLORIDE 400 MG/1
1 TABLET ORAL
Refills: 0 | DISCHARGE

## 2023-01-01 RX ORDER — ALLOPURINOL 300 MG
100 TABLET ORAL
Refills: 0 | Status: DISCONTINUED | OUTPATIENT
Start: 2023-01-01 | End: 2023-01-01

## 2023-01-01 RX ORDER — ISOSORBIDE DINITRATE 5 MG/1
5 TABLET ORAL
Refills: 0 | Status: DISCONTINUED | OUTPATIENT
Start: 2023-01-01 | End: 2023-01-01

## 2023-01-01 RX ORDER — CHOLECALCIFEROL (VITAMIN D3) 125 MCG
1000 CAPSULE ORAL DAILY
Refills: 0 | Status: DISCONTINUED | OUTPATIENT
Start: 2023-01-01 | End: 2023-01-01

## 2023-01-01 RX ORDER — DAPAGLIFLOZIN 5 MG/1
5 TABLET, FILM COATED ORAL EVERY OTHER DAY
Refills: 0 | Status: ACTIVE | COMMUNITY
Start: 2023-01-01

## 2023-01-01 RX ORDER — LEVOTHYROXINE SODIUM 125 MCG
125 TABLET ORAL
Refills: 0 | Status: DISCONTINUED | OUTPATIENT
Start: 2023-01-01 | End: 2023-01-01

## 2023-01-01 RX ORDER — MULTIVIT-MIN/FERROUS GLUCONATE 9 MG/15 ML
2 LIQUID (ML) ORAL
Refills: 0 | DISCHARGE

## 2023-01-01 RX ORDER — ASPIRIN 81 MG/1
81 TABLET ORAL
Refills: 0 | Status: ACTIVE | COMMUNITY
Start: 2022-06-09

## 2023-01-01 RX ORDER — OMEGA-3 FATTY ACIDS/FISH OIL 360-1200MG
1200 CAPSULE ORAL
Refills: 0 | Status: ACTIVE | COMMUNITY

## 2023-01-01 RX ORDER — ALLOPURINOL 100 MG/1
100 TABLET ORAL DAILY
Refills: 0 | Status: ACTIVE | COMMUNITY

## 2023-01-01 RX ORDER — ALLOPURINOL 300 MG
1 TABLET ORAL
Refills: 0 | DISCHARGE

## 2023-01-01 RX ORDER — VIT C/E/ZN/COPPR/LUTEIN/ZEAXAN 250MG-90MG
CAPSULE ORAL
Refills: 0 | Status: ACTIVE | COMMUNITY

## 2023-01-01 RX ORDER — INFLUENZA VIRUS VACCINE 15; 15; 15; 15 UG/.5ML; UG/.5ML; UG/.5ML; UG/.5ML
0.7 SUSPENSION INTRAMUSCULAR ONCE
Refills: 0 | Status: DISCONTINUED | OUTPATIENT
Start: 2023-01-01 | End: 2023-01-01

## 2023-01-01 RX ORDER — FUROSEMIDE 40 MG
20 TABLET ORAL EVERY 8 HOURS
Refills: 0 | Status: DISCONTINUED | OUTPATIENT
Start: 2023-01-01 | End: 2023-01-01

## 2023-01-01 RX ORDER — ACETAMINOPHEN 500 MG
650 TABLET ORAL EVERY 6 HOURS
Refills: 0 | Status: DISCONTINUED | OUTPATIENT
Start: 2023-01-01 | End: 2023-01-01

## 2023-01-01 RX ORDER — LEVOTHYROXINE SODIUM 125 MCG
187.5 TABLET ORAL
Refills: 0 | Status: DISCONTINUED | OUTPATIENT
Start: 2023-01-01 | End: 2023-01-01

## 2023-01-01 RX ORDER — PREGABALIN 225 MG/1
1 CAPSULE ORAL
Refills: 0 | DISCHARGE

## 2023-01-01 RX ORDER — MENTHOL/CAMPHOR 0.5 %-0.5%
1000 LOTION (ML) TOPICAL
Refills: 0 | Status: ACTIVE | COMMUNITY

## 2023-01-01 RX ORDER — FUROSEMIDE 40 MG/1
40 TABLET ORAL DAILY
Qty: 30 | Refills: 0 | Status: ACTIVE | COMMUNITY

## 2023-01-01 RX ORDER — LEVOTHYROXINE SODIUM 0.12 MG/1
125 TABLET ORAL DAILY
Refills: 0 | Status: ACTIVE | COMMUNITY

## 2023-01-01 RX ORDER — FUROSEMIDE 40 MG
20 TABLET ORAL ONCE
Refills: 0 | Status: COMPLETED | OUTPATIENT
Start: 2023-01-01 | End: 2023-01-01

## 2023-01-01 RX ORDER — CHOLECALCIFEROL (VITAMIN D3) 125 MCG
1 CAPSULE ORAL
Refills: 0 | DISCHARGE

## 2023-01-01 RX ORDER — MIDODRINE HYDROCHLORIDE 2.5 MG/1
1 TABLET ORAL
Qty: 90 | Refills: 0
Start: 2023-01-01

## 2023-01-01 RX ORDER — CYANOCOBALAMIN (VITAMIN B-12) 1000 MCG
1000 TABLET ORAL
Refills: 0 | Status: ACTIVE | COMMUNITY

## 2023-01-01 RX ORDER — OMEGA-3 ACID ETHYL ESTERS 1 G
1 CAPSULE ORAL
Qty: 0 | Refills: 0 | DISCHARGE

## 2023-01-01 RX ORDER — LEVOTHYROXINE SODIUM 125 MCG
1.5 TABLET ORAL
Refills: 0 | DISCHARGE

## 2023-01-01 RX ORDER — MIDODRINE HYDROCHLORIDE 2.5 MG/1
2.5 TABLET ORAL 3 TIMES DAILY
Refills: 0 | Status: ACTIVE | COMMUNITY
Start: 2023-01-01

## 2023-01-01 RX ADMIN — CARVEDILOL PHOSPHATE 9.38 MILLIGRAM(S): 80 CAPSULE, EXTENDED RELEASE ORAL at 21:57

## 2023-01-01 RX ADMIN — MIDODRINE HYDROCHLORIDE 2.5 MILLIGRAM(S): 2.5 TABLET ORAL at 12:02

## 2023-01-01 RX ADMIN — ISOSORBIDE DINITRATE 5 MILLIGRAM(S): 5 TABLET ORAL at 10:12

## 2023-01-01 RX ADMIN — CARVEDILOL PHOSPHATE 9.38 MILLIGRAM(S): 80 CAPSULE, EXTENDED RELEASE ORAL at 09:10

## 2023-01-01 RX ADMIN — ZOLPIDEM TARTRATE 2.5 MILLIGRAM(S): 10 TABLET ORAL at 01:43

## 2023-01-01 RX ADMIN — Medication 20 MILLIGRAM(S): at 13:35

## 2023-01-01 RX ADMIN — Medication 1000 UNIT(S): at 10:12

## 2023-01-01 RX ADMIN — AMIODARONE HYDROCHLORIDE 200 MILLIGRAM(S): 400 TABLET ORAL at 09:10

## 2023-01-01 RX ADMIN — Medication 100 MILLIGRAM(S): at 09:10

## 2023-01-01 RX ADMIN — ISOSORBIDE DINITRATE 5 MILLIGRAM(S): 5 TABLET ORAL at 09:10

## 2023-01-01 RX ADMIN — HEPARIN SODIUM 5000 UNIT(S): 5000 INJECTION INTRAVENOUS; SUBCUTANEOUS at 09:09

## 2023-01-01 RX ADMIN — Medication 100 MILLIGRAM(S): at 21:23

## 2023-01-01 RX ADMIN — Medication 81 MILLIGRAM(S): at 09:10

## 2023-01-01 RX ADMIN — Medication 125 MICROGRAM(S): at 10:12

## 2023-01-01 RX ADMIN — Medication 20 MILLIGRAM(S): at 05:26

## 2023-01-01 RX ADMIN — Medication 125 MICROGRAM(S): at 09:10

## 2023-01-01 RX ADMIN — CARVEDILOL PHOSPHATE 9.38 MILLIGRAM(S): 80 CAPSULE, EXTENDED RELEASE ORAL at 21:23

## 2023-01-01 RX ADMIN — Medication 100 MILLIGRAM(S): at 10:12

## 2023-01-01 RX ADMIN — Medication 1000 UNIT(S): at 09:10

## 2023-01-01 RX ADMIN — Medication 100 MILLIGRAM(S): at 21:56

## 2023-01-01 RX ADMIN — AMIODARONE HYDROCHLORIDE 200 MILLIGRAM(S): 400 TABLET ORAL at 10:12

## 2023-01-01 RX ADMIN — PREGABALIN 1000 MICROGRAM(S): 225 CAPSULE ORAL at 10:12

## 2023-01-01 RX ADMIN — ISOSORBIDE DINITRATE 5 MILLIGRAM(S): 5 TABLET ORAL at 21:23

## 2023-01-01 RX ADMIN — PREGABALIN 1000 MICROGRAM(S): 225 CAPSULE ORAL at 09:10

## 2023-01-01 RX ADMIN — Medication 20 MILLIGRAM(S): at 21:22

## 2023-01-01 RX ADMIN — CARVEDILOL PHOSPHATE 9.38 MILLIGRAM(S): 80 CAPSULE, EXTENDED RELEASE ORAL at 10:12

## 2023-01-01 RX ADMIN — Medication 81 MILLIGRAM(S): at 10:12

## 2023-01-01 RX ADMIN — Medication 20 MILLIGRAM(S): at 14:06

## 2023-02-10 ENCOUNTER — NON-APPOINTMENT (OUTPATIENT)
Age: 88
End: 2023-02-10

## 2023-02-14 ENCOUNTER — APPOINTMENT (OUTPATIENT)
Dept: ELECTROPHYSIOLOGY | Facility: CLINIC | Age: 88
End: 2023-02-14
Payer: MEDICARE

## 2023-02-14 ENCOUNTER — NON-APPOINTMENT (OUTPATIENT)
Age: 88
End: 2023-02-14

## 2023-02-14 VITALS
WEIGHT: 136 LBS | HEART RATE: 82 BPM | HEIGHT: 65 IN | OXYGEN SATURATION: 97 % | DIASTOLIC BLOOD PRESSURE: 62 MMHG | SYSTOLIC BLOOD PRESSURE: 110 MMHG | BODY MASS INDEX: 22.66 KG/M2

## 2023-02-14 DIAGNOSIS — T82.198A OTHER MECHANICAL COMPLICATION OF OTHER CARDIAC ELECTRONIC DEVICE, INITIAL ENCOUNTER: ICD-10-CM

## 2023-02-14 DIAGNOSIS — I10 ESSENTIAL (PRIMARY) HYPERTENSION: ICD-10-CM

## 2023-02-14 PROCEDURE — 93283 PRGRMG EVAL IMPLANTABLE DFB: CPT

## 2023-03-13 ENCOUNTER — APPOINTMENT (OUTPATIENT)
Dept: ELECTROPHYSIOLOGY | Facility: CLINIC | Age: 88
End: 2023-03-13
Payer: MEDICARE

## 2023-03-14 ENCOUNTER — NON-APPOINTMENT (OUTPATIENT)
Age: 88
End: 2023-03-14

## 2023-03-14 PROCEDURE — 93296 REM INTERROG EVL PM/IDS: CPT

## 2023-03-14 PROCEDURE — 93295 DEV INTERROG REMOTE 1/2/MLT: CPT

## 2023-05-08 ENCOUNTER — INPATIENT (INPATIENT)
Facility: HOSPITAL | Age: 88
LOS: 0 days | Discharge: HOME | DRG: 313 | End: 2023-05-09
Attending: HOSPITALIST | Admitting: FAMILY MEDICINE
Payer: MEDICARE

## 2023-05-08 VITALS
RESPIRATION RATE: 19 BRPM | DIASTOLIC BLOOD PRESSURE: 54 MMHG | HEART RATE: 72 BPM | SYSTOLIC BLOOD PRESSURE: 92 MMHG | TEMPERATURE: 98 F | OXYGEN SATURATION: 100 %

## 2023-05-08 DIAGNOSIS — Z87.2 PERSONAL HISTORY OF DISEASES OF THE SKIN AND SUBCUTANEOUS TISSUE: Chronic | ICD-10-CM

## 2023-05-08 DIAGNOSIS — Z95.810 PRESENCE OF AUTOMATIC (IMPLANTABLE) CARDIAC DEFIBRILLATOR: Chronic | ICD-10-CM

## 2023-05-08 DIAGNOSIS — Z90.89 ACQUIRED ABSENCE OF OTHER ORGANS: Chronic | ICD-10-CM

## 2023-05-08 DIAGNOSIS — Z41.9 ENCOUNTER FOR PROCEDURE FOR PURPOSES OTHER THAN REMEDYING HEALTH STATE, UNSPECIFIED: Chronic | ICD-10-CM

## 2023-05-08 DIAGNOSIS — I20.0 UNSTABLE ANGINA: ICD-10-CM

## 2023-05-08 DIAGNOSIS — Z98.890 OTHER SPECIFIED POSTPROCEDURAL STATES: Chronic | ICD-10-CM

## 2023-05-08 LAB
ALBUMIN SERPL ELPH-MCNC: 3.4 G/DL — SIGNIFICANT CHANGE UP (ref 3.3–5)
ALP SERPL-CCNC: 163 U/L — HIGH (ref 40–120)
ALT FLD-CCNC: 19 U/L — SIGNIFICANT CHANGE UP (ref 12–78)
ANION GAP SERPL CALC-SCNC: 5 MMOL/L — SIGNIFICANT CHANGE UP (ref 5–17)
APTT BLD: 34.4 SEC — SIGNIFICANT CHANGE UP (ref 27.5–35.5)
AST SERPL-CCNC: 19 U/L — SIGNIFICANT CHANGE UP (ref 15–37)
BASOPHILS # BLD AUTO: 0.07 K/UL — SIGNIFICANT CHANGE UP (ref 0–0.2)
BASOPHILS NFR BLD AUTO: 1 % — SIGNIFICANT CHANGE UP (ref 0–2)
BILIRUB SERPL-MCNC: 1.1 MG/DL — SIGNIFICANT CHANGE UP (ref 0.2–1.2)
BUN SERPL-MCNC: 35 MG/DL — HIGH (ref 7–23)
CALCIUM SERPL-MCNC: 9.2 MG/DL — SIGNIFICANT CHANGE UP (ref 8.5–10.1)
CHLORIDE SERPL-SCNC: 99 MMOL/L — SIGNIFICANT CHANGE UP (ref 96–108)
CO2 SERPL-SCNC: 33 MMOL/L — HIGH (ref 22–31)
CREAT SERPL-MCNC: 1.42 MG/DL — HIGH (ref 0.5–1.3)
EGFR: 47 ML/MIN/1.73M2 — LOW
EOSINOPHIL # BLD AUTO: 0.44 K/UL — SIGNIFICANT CHANGE UP (ref 0–0.5)
EOSINOPHIL NFR BLD AUTO: 6.1 % — HIGH (ref 0–6)
GLUCOSE SERPL-MCNC: 98 MG/DL — SIGNIFICANT CHANGE UP (ref 70–99)
HCT VFR BLD CALC: 39.3 % — SIGNIFICANT CHANGE UP (ref 39–50)
HGB BLD-MCNC: 13.1 G/DL — SIGNIFICANT CHANGE UP (ref 13–17)
IMM GRANULOCYTES NFR BLD AUTO: 0.4 % — SIGNIFICANT CHANGE UP (ref 0–0.9)
INR BLD: 1.08 RATIO — SIGNIFICANT CHANGE UP (ref 0.88–1.16)
LYMPHOCYTES # BLD AUTO: 1.13 K/UL — SIGNIFICANT CHANGE UP (ref 1–3.3)
LYMPHOCYTES # BLD AUTO: 15.7 % — SIGNIFICANT CHANGE UP (ref 13–44)
MAGNESIUM SERPL-MCNC: 2.2 MG/DL — SIGNIFICANT CHANGE UP (ref 1.6–2.6)
MCHC RBC-ENTMCNC: 31 PG — SIGNIFICANT CHANGE UP (ref 27–34)
MCHC RBC-ENTMCNC: 33.3 GM/DL — SIGNIFICANT CHANGE UP (ref 32–36)
MCV RBC AUTO: 92.9 FL — SIGNIFICANT CHANGE UP (ref 80–100)
MONOCYTES # BLD AUTO: 0.83 K/UL — SIGNIFICANT CHANGE UP (ref 0–0.9)
MONOCYTES NFR BLD AUTO: 11.5 % — SIGNIFICANT CHANGE UP (ref 2–14)
NEUTROPHILS # BLD AUTO: 4.7 K/UL — SIGNIFICANT CHANGE UP (ref 1.8–7.4)
NEUTROPHILS NFR BLD AUTO: 65.3 % — SIGNIFICANT CHANGE UP (ref 43–77)
NT-PROBNP SERPL-SCNC: 7313 PG/ML — HIGH (ref 0–450)
PLATELET # BLD AUTO: 214 K/UL — SIGNIFICANT CHANGE UP (ref 150–400)
POTASSIUM SERPL-MCNC: 3.9 MMOL/L — SIGNIFICANT CHANGE UP (ref 3.5–5.3)
POTASSIUM SERPL-SCNC: 3.9 MMOL/L — SIGNIFICANT CHANGE UP (ref 3.5–5.3)
PROT SERPL-MCNC: 7.6 GM/DL — SIGNIFICANT CHANGE UP (ref 6–8.3)
PROTHROM AB SERPL-ACNC: 12.5 SEC — SIGNIFICANT CHANGE UP (ref 10.5–13.4)
RBC # BLD: 4.23 M/UL — SIGNIFICANT CHANGE UP (ref 4.2–5.8)
RBC # FLD: 15 % — HIGH (ref 10.3–14.5)
SODIUM SERPL-SCNC: 137 MMOL/L — SIGNIFICANT CHANGE UP (ref 135–145)
TROPONIN I, HIGH SENSITIVITY RESULT: 15.75 NG/L — SIGNIFICANT CHANGE UP
TROPONIN I, HIGH SENSITIVITY RESULT: 15.9 NG/L — SIGNIFICANT CHANGE UP
WBC # BLD: 7.2 K/UL — SIGNIFICANT CHANGE UP (ref 3.8–10.5)
WBC # FLD AUTO: 7.2 K/UL — SIGNIFICANT CHANGE UP (ref 3.8–10.5)

## 2023-05-08 PROCEDURE — 85025 COMPLETE CBC W/AUTO DIFF WBC: CPT

## 2023-05-08 PROCEDURE — 85730 THROMBOPLASTIN TIME PARTIAL: CPT

## 2023-05-08 PROCEDURE — 80061 LIPID PANEL: CPT

## 2023-05-08 PROCEDURE — 83036 HEMOGLOBIN GLYCOSYLATED A1C: CPT

## 2023-05-08 PROCEDURE — 84484 ASSAY OF TROPONIN QUANT: CPT

## 2023-05-08 PROCEDURE — 71045 X-RAY EXAM CHEST 1 VIEW: CPT | Mod: 26

## 2023-05-08 PROCEDURE — 93005 ELECTROCARDIOGRAM TRACING: CPT | Mod: XU

## 2023-05-08 PROCEDURE — 85610 PROTHROMBIN TIME: CPT

## 2023-05-08 PROCEDURE — 93010 ELECTROCARDIOGRAM REPORT: CPT

## 2023-05-08 PROCEDURE — 36415 COLL VENOUS BLD VENIPUNCTURE: CPT

## 2023-05-08 PROCEDURE — 80053 COMPREHEN METABOLIC PANEL: CPT

## 2023-05-08 PROCEDURE — 99285 EMERGENCY DEPT VISIT HI MDM: CPT

## 2023-05-08 PROCEDURE — 93306 TTE W/DOPPLER COMPLETE: CPT

## 2023-05-08 RX ORDER — SODIUM CHLORIDE 9 MG/ML
500 INJECTION INTRAMUSCULAR; INTRAVENOUS; SUBCUTANEOUS ONCE
Refills: 0 | Status: DISCONTINUED | OUTPATIENT
Start: 2023-05-08 | End: 2023-05-09

## 2023-05-08 RX ORDER — ONDANSETRON 8 MG/1
4 TABLET, FILM COATED ORAL EVERY 6 HOURS
Refills: 0 | Status: DISCONTINUED | OUTPATIENT
Start: 2023-05-08 | End: 2023-05-09

## 2023-05-08 RX ORDER — ACETAMINOPHEN 500 MG
650 TABLET ORAL EVERY 6 HOURS
Refills: 0 | Status: DISCONTINUED | OUTPATIENT
Start: 2023-05-08 | End: 2023-05-09

## 2023-05-08 NOTE — ED ADULT NURSE NOTE - NSIMPLEMENTINTERV_GEN_ALL_ED
Implemented All Fall with Harm Risk Interventions:  [place] to call system. Call bell, personal items and telephone within reach. Instruct patient to call for assistance. Room bathroom lighting operational. Non-slip footwear when patient is off stretcher. Physically safe environment: no spills, clutter or unnecessary equipment. Stretcher in lowest position, wheels locked, appropriate side rails in place. Provide visual cue, wrist band, yellow gown, etc. Monitor gait and stability. Monitor for mental status changes and reorient to person, place, and time. Review medications for side effects contributing to fall risk. Reinforce activity limits and safety measures with patient and family. Provide visual clues: red socks.

## 2023-05-08 NOTE — ED PROVIDER NOTE - OBJECTIVE STATEMENT
89 yo M with PMHx of vtach, CAD s/p pacemaker with defib, prostate CA, MI, HTN, presents to ED c/o L sided CP since this morning. Was sitting at [employer], didn't eat or drink, had intermittent CP each for a few seconds. Went to Dr. Jenelle bunch for sx, who sent pt to ED. States the last 1.5 hours, CP has not returned. States he is on his 4th defib, had defib fire on him in the past, but states this is not the same. States 96/58 BP is normal for pt. Reports CAD hx, but was told that he does not need stents. States he has regular cardiac workups with Dr. Almanzar and Dr. Fernandez. On Lasix and baby ASA, not on any AC. nonsmoker or EtOH.  PCP: Dr. Almanzar  cardiologist: Dr. Fernandez

## 2023-05-08 NOTE — ED PROVIDER NOTE - CLINICAL SUMMARY MEDICAL DECISION MAKING FREE TEXT BOX
88 yo M with significant cardiac hx, presenting with several episodes of CP throughout the day, now resolved. Plan for cardiac workup, discuss with cardiologies, likely admission. 89 yo M with significant cardiac hx, presenting with several episodes of CP throughout the day, now resolved. Plan for cardiac workup, discuss with cardiologies, likely admission.  -Initial troponin negative, BNP elevated but improved from previous.  Chest x-ray clear.  Patient's pain has now resolved.  Discussed with his cardiology group as well as with electrophysiology, patient will be admitted for full cardiac work-up.

## 2023-05-08 NOTE — ED PROVIDER NOTE - PHYSICAL EXAMINATION
General: AAOx3, NAD  HEENT: NCAT  Cardiac: Normal rate and rhythm, no murmurs, normal peripheral perfusion  Respiratory: Normal rate and effort. CTAB  GI: Soft, nondistended, nontender  Neuro: No focal deficits. LOU equally x4, sensation to light touch intact throughout  MSK: FROMx4, no focal bony tenderness, no peripheral edema  Skin: No rash

## 2023-05-08 NOTE — ED ADULT NURSE NOTE - OBJECTIVE STATEMENT
Pt presents to the ER with complaints of intermittent chest pain that started today around 2-3pm. Pt went to see his cardiologist Jenelle, who told him to come to the ER. Pt states that his chest pain comes in waves, wife gave him 325mg of aspirin. Pt has a hx of CHF and a defibrillator. Pmhx of prostate CA approx. 20 years ago. Pt denies SOB, nausea, vomiting, dizziness and fevers. No distress noted.

## 2023-05-08 NOTE — PATIENT PROFILE ADULT - FALL HARM RISK - HARM RISK INTERVENTIONS

## 2023-05-08 NOTE — ED PROVIDER NOTE - NS ED ROS FT
Constitutional: No fevers, chills, or sweats.  Cardiac: No exertional dyspnea, orthopnea, + CP  Respiratory: No shortness of breath, no cough  GI: No abdominal pain, no N/V/D  Neuro: No headaches, no neck pain/stiffness, no numbness  All other systems reviewed and are negative unless otherwise stated in the HPI.

## 2023-05-08 NOTE — ED ADULT TRIAGE NOTE - CHIEF COMPLAINT QUOTE
pt presents to ED due to complaints of chest pain since this AM pt states it comes and goes hx of PPM with defib

## 2023-05-08 NOTE — ED STATDOCS - PROGRESS NOTE DETAILS
89M hx vtach pacemaker MI CAD presents with left sided cp coming and going since earlier today. pt high risk for CP hypotensive - will send to main. Nico Hickey M.D., Attending Physician

## 2023-05-08 NOTE — PATIENT PROFILE ADULT - VISION (WITH CORRECTIVE LENSES IF THE PATIENT USUALLY WEARS THEM):
2 pair glasses/Normal vision: sees adequately in most situations; can see medication labels, newsprint

## 2023-05-09 ENCOUNTER — TRANSCRIPTION ENCOUNTER (OUTPATIENT)
Age: 88
End: 2023-05-09

## 2023-05-09 VITALS
TEMPERATURE: 98 F | DIASTOLIC BLOOD PRESSURE: 57 MMHG | HEART RATE: 71 BPM | RESPIRATION RATE: 18 BRPM | SYSTOLIC BLOOD PRESSURE: 106 MMHG | OXYGEN SATURATION: 98 %

## 2023-05-09 LAB
A1C WITH ESTIMATED AVERAGE GLUCOSE RESULT: 5.7 % — HIGH (ref 4–5.6)
ALBUMIN SERPL ELPH-MCNC: 3.1 G/DL — LOW (ref 3.3–5)
ALP SERPL-CCNC: 147 U/L — HIGH (ref 40–120)
ALT FLD-CCNC: 19 U/L — SIGNIFICANT CHANGE UP (ref 12–78)
ANION GAP SERPL CALC-SCNC: 4 MMOL/L — LOW (ref 5–17)
APTT BLD: 34.4 SEC — SIGNIFICANT CHANGE UP (ref 27.5–35.5)
AST SERPL-CCNC: 32 U/L — SIGNIFICANT CHANGE UP (ref 15–37)
BASOPHILS # BLD AUTO: 0.06 K/UL — SIGNIFICANT CHANGE UP (ref 0–0.2)
BASOPHILS NFR BLD AUTO: 0.8 % — SIGNIFICANT CHANGE UP (ref 0–2)
BILIRUB SERPL-MCNC: 1.4 MG/DL — HIGH (ref 0.2–1.2)
BUN SERPL-MCNC: 30 MG/DL — HIGH (ref 7–23)
CALCIUM SERPL-MCNC: 9.1 MG/DL — SIGNIFICANT CHANGE UP (ref 8.5–10.1)
CHLORIDE SERPL-SCNC: 103 MMOL/L — SIGNIFICANT CHANGE UP (ref 96–108)
CHOLEST SERPL-MCNC: 198 MG/DL — SIGNIFICANT CHANGE UP
CO2 SERPL-SCNC: 29 MMOL/L — SIGNIFICANT CHANGE UP (ref 22–31)
CREAT SERPL-MCNC: 1.23 MG/DL — SIGNIFICANT CHANGE UP (ref 0.5–1.3)
EGFR: 56 ML/MIN/1.73M2 — LOW
EOSINOPHIL # BLD AUTO: 0.49 K/UL — SIGNIFICANT CHANGE UP (ref 0–0.5)
EOSINOPHIL NFR BLD AUTO: 6.6 % — HIGH (ref 0–6)
ESTIMATED AVERAGE GLUCOSE: 117 MG/DL — HIGH (ref 68–114)
GLUCOSE SERPL-MCNC: 84 MG/DL — SIGNIFICANT CHANGE UP (ref 70–99)
HCT VFR BLD CALC: 41.3 % — SIGNIFICANT CHANGE UP (ref 39–50)
HDLC SERPL-MCNC: 47 MG/DL — SIGNIFICANT CHANGE UP
HGB BLD-MCNC: 13.8 G/DL — SIGNIFICANT CHANGE UP (ref 13–17)
IMM GRANULOCYTES NFR BLD AUTO: 0.3 % — SIGNIFICANT CHANGE UP (ref 0–0.9)
INR BLD: 1.08 RATIO — SIGNIFICANT CHANGE UP (ref 0.88–1.16)
LIPID PNL WITH DIRECT LDL SERPL: 123 MG/DL — HIGH
LYMPHOCYTES # BLD AUTO: 1.22 K/UL — SIGNIFICANT CHANGE UP (ref 1–3.3)
LYMPHOCYTES # BLD AUTO: 16.3 % — SIGNIFICANT CHANGE UP (ref 13–44)
MCHC RBC-ENTMCNC: 31 PG — SIGNIFICANT CHANGE UP (ref 27–34)
MCHC RBC-ENTMCNC: 33.4 GM/DL — SIGNIFICANT CHANGE UP (ref 32–36)
MCV RBC AUTO: 92.8 FL — SIGNIFICANT CHANGE UP (ref 80–100)
MONOCYTES # BLD AUTO: 0.83 K/UL — SIGNIFICANT CHANGE UP (ref 0–0.9)
MONOCYTES NFR BLD AUTO: 11.1 % — SIGNIFICANT CHANGE UP (ref 2–14)
NEUTROPHILS # BLD AUTO: 4.86 K/UL — SIGNIFICANT CHANGE UP (ref 1.8–7.4)
NEUTROPHILS NFR BLD AUTO: 64.9 % — SIGNIFICANT CHANGE UP (ref 43–77)
NON HDL CHOLESTEROL: 150 MG/DL — HIGH
PLATELET # BLD AUTO: 211 K/UL — SIGNIFICANT CHANGE UP (ref 150–400)
POTASSIUM SERPL-MCNC: 4.8 MMOL/L — SIGNIFICANT CHANGE UP (ref 3.5–5.3)
POTASSIUM SERPL-SCNC: 4.8 MMOL/L — SIGNIFICANT CHANGE UP (ref 3.5–5.3)
PROT SERPL-MCNC: 7.2 GM/DL — SIGNIFICANT CHANGE UP (ref 6–8.3)
PROTHROM AB SERPL-ACNC: 12.5 SEC — SIGNIFICANT CHANGE UP (ref 10.5–13.4)
RBC # BLD: 4.45 M/UL — SIGNIFICANT CHANGE UP (ref 4.2–5.8)
RBC # FLD: 15 % — HIGH (ref 10.3–14.5)
SODIUM SERPL-SCNC: 136 MMOL/L — SIGNIFICANT CHANGE UP (ref 135–145)
TRIGL SERPL-MCNC: 139 MG/DL — SIGNIFICANT CHANGE UP
TROPONIN I, HIGH SENSITIVITY RESULT: 18.73 NG/L — SIGNIFICANT CHANGE UP
WBC # BLD: 7.48 K/UL — SIGNIFICANT CHANGE UP (ref 3.8–10.5)
WBC # FLD AUTO: 7.48 K/UL — SIGNIFICANT CHANGE UP (ref 3.8–10.5)

## 2023-05-09 PROCEDURE — 93283 PRGRMG EVAL IMPLANTABLE DFB: CPT | Mod: 26

## 2023-05-09 PROCEDURE — 93306 TTE W/DOPPLER COMPLETE: CPT | Mod: 26

## 2023-05-09 PROCEDURE — 12345: CPT | Mod: NC

## 2023-05-09 PROCEDURE — 99236 HOSP IP/OBS SAME DATE HI 85: CPT

## 2023-05-09 PROCEDURE — 93010 ELECTROCARDIOGRAM REPORT: CPT

## 2023-05-09 RX ORDER — OMEGA-3 ACID ETHYL ESTERS 1 G
3 CAPSULE ORAL DAILY
Refills: 0 | Status: DISCONTINUED | OUTPATIENT
Start: 2023-05-09 | End: 2023-05-09

## 2023-05-09 RX ORDER — FUROSEMIDE 40 MG
20 TABLET ORAL DAILY
Refills: 0 | Status: DISCONTINUED | OUTPATIENT
Start: 2023-05-09 | End: 2023-05-09

## 2023-05-09 RX ORDER — PREGABALIN 225 MG/1
1000 CAPSULE ORAL DAILY
Refills: 0 | Status: DISCONTINUED | OUTPATIENT
Start: 2023-05-09 | End: 2023-05-09

## 2023-05-09 RX ORDER — CARVEDILOL PHOSPHATE 80 MG/1
3.12 CAPSULE, EXTENDED RELEASE ORAL EVERY 12 HOURS
Refills: 0 | Status: DISCONTINUED | OUTPATIENT
Start: 2023-05-09 | End: 2023-05-09

## 2023-05-09 RX ORDER — ASPIRIN/CALCIUM CARB/MAGNESIUM 324 MG
81 TABLET ORAL DAILY
Refills: 0 | Status: DISCONTINUED | OUTPATIENT
Start: 2023-05-09 | End: 2023-05-09

## 2023-05-09 RX ORDER — HEPARIN SODIUM 5000 [USP'U]/ML
5000 INJECTION INTRAVENOUS; SUBCUTANEOUS EVERY 12 HOURS
Refills: 0 | Status: DISCONTINUED | OUTPATIENT
Start: 2023-05-09 | End: 2023-05-09

## 2023-05-09 RX ORDER — DAPAGLIFLOZIN 10 MG/1
5 TABLET, FILM COATED ORAL EVERY 24 HOURS
Refills: 0 | Status: DISCONTINUED | OUTPATIENT
Start: 2023-05-09 | End: 2023-05-09

## 2023-05-09 RX ORDER — ASPIRIN/CALCIUM CARB/MAGNESIUM 324 MG
81 TABLET ORAL ONCE
Refills: 0 | Status: COMPLETED | OUTPATIENT
Start: 2023-05-09 | End: 2023-05-09

## 2023-05-09 RX ORDER — LEVOTHYROXINE SODIUM 125 MCG
1 TABLET ORAL
Qty: 0 | Refills: 0 | DISCHARGE

## 2023-05-09 RX ORDER — CHOLECALCIFEROL (VITAMIN D3) 125 MCG
1000 CAPSULE ORAL DAILY
Refills: 0 | Status: DISCONTINUED | OUTPATIENT
Start: 2023-05-09 | End: 2023-05-09

## 2023-05-09 RX ORDER — ALLOPURINOL 300 MG
1 TABLET ORAL
Qty: 0 | Refills: 0 | DISCHARGE

## 2023-05-09 RX ORDER — CARVEDILOL PHOSPHATE 80 MG/1
1.5 CAPSULE, EXTENDED RELEASE ORAL
Qty: 0 | Refills: 0 | DISCHARGE

## 2023-05-09 RX ORDER — LEVOTHYROXINE SODIUM 125 MCG
125 TABLET ORAL DAILY
Refills: 0 | Status: DISCONTINUED | OUTPATIENT
Start: 2023-05-09 | End: 2023-05-09

## 2023-05-09 RX ORDER — ISOSORBIDE DINITRATE 5 MG/1
5 TABLET ORAL THREE TIMES A DAY
Refills: 0 | Status: DISCONTINUED | OUTPATIENT
Start: 2023-05-09 | End: 2023-05-09

## 2023-05-09 RX ORDER — AMIODARONE HYDROCHLORIDE 400 MG/1
1 TABLET ORAL
Qty: 0 | Refills: 0 | DISCHARGE

## 2023-05-09 RX ORDER — MULTIVIT-MIN/FERROUS GLUCONATE 9 MG/15 ML
1 LIQUID (ML) ORAL DAILY
Refills: 0 | Status: DISCONTINUED | OUTPATIENT
Start: 2023-05-09 | End: 2023-05-09

## 2023-05-09 RX ADMIN — PREGABALIN 1000 MICROGRAM(S): 225 CAPSULE ORAL at 10:29

## 2023-05-09 RX ADMIN — Medication 81 MILLIGRAM(S): at 02:51

## 2023-05-09 RX ADMIN — Medication 1 TABLET(S): at 10:29

## 2023-05-09 RX ADMIN — HEPARIN SODIUM 5000 UNIT(S): 5000 INJECTION INTRAVENOUS; SUBCUTANEOUS at 10:30

## 2023-05-09 RX ADMIN — Medication 1000 UNIT(S): at 10:29

## 2023-05-09 RX ADMIN — Medication 3 GRAM(S): at 10:29

## 2023-05-09 RX ADMIN — DAPAGLIFLOZIN 5 MILLIGRAM(S): 10 TABLET, FILM COATED ORAL at 06:05

## 2023-05-09 RX ADMIN — Medication 81 MILLIGRAM(S): at 10:29

## 2023-05-09 NOTE — DISCHARGE NOTE NURSING/CASE MANAGEMENT/SOCIAL WORK - NSDCFUADDAPPT_GEN_ALL_CORE_FT
Follow up appointment with Dr. Almanzar on 5/12/23 at 4:15pm  Follow up appointment with Dr. Almanzar on 5/12/23 at 4:15pm     faxed discharge papers to Dr. Herson Almanzar  [phone]

## 2023-05-09 NOTE — DISCHARGE NOTE NURSING/CASE MANAGEMENT/SOCIAL WORK - NSDCPEFALRISK_GEN_ALL_CORE
For information on Fall & Injury Prevention, visit: [email or web address] OR  [email or web address] OR  https://www.cdc.gov/steadi/patient.html

## 2023-05-09 NOTE — PROGRESS NOTE ADULT - SUBJECTIVE AND OBJECTIVE BOX
Chief Complaint: Chest discomfort    Interval Hx: Patient is now asymptomatic, feeling well no complaints, eager to be discharged home.     ROS: Multi system review is comprehensively negative x 10 systems     Vitals:  T(F): 97.9 (09 May 2023 07:53), Max: 98.3 (08 May 2023 22:57)  HR: 71 (09 May 2023 07:53) (60 - 87)  BP: 106/57 (09 May 2023 07:53) (82/55 - 106/72)  RR: 18 (09 May 2023 07:53) (16 - 19)  SpO2: 98% (09 May 2023 07:53) (98% - 100%) on room air    Exam:  Gen: Comfortable  HEENT: NCAT PERRL EOMI MMM clear oropharynx  Neck: Supple, no JVD, no LAD, no thyromegaly  CVS: s1 s2 normal, RRR  Chest: Normal resp effort, lungs CTA B/L  Abd: +BS, soft, NT, ND  Ext: No edema or calf tenderness  Skin: Warm, dry  Mood: Calm, pleasant  Neuro: A+Ox,3 no deficits.    Labs:                       13.8   7.48 )-----------( 211              41.3       136  |  103  |  30  ----------------------<  84  4.8   |   29   |  1.23    Ca 9.1  Mg 2.2    TPro  7.2  /  Alb  3.1  /  TBili  1.4  /  DBili  x   /  AST  32  /  ALT  19  /  AlkPhos  147    PT: 12.5 sec;   INR: 1.08 ratio  PTT: 34.4 sec    Troponin negative x 3  proBNP 7313 (down from > 13,000 last year)    A1c 5.7    HDL 47  Trig 139    Imaging:  CXR 5/8: Normal cardiac silhouette and normal pulmonary vasculature with no consolidation, effusion, pneumothorax or acute osseous finding. Pacemaker with wire tips in the right atrium and right ventricle, unchanged.    Cardiac Testing:  AICD interrogation 5/9: Report pending    TTE 5/9: Report pending    EKG 5/8: V paced, rate 76    Meds:  MEDICATIONS  (STANDING):  aspirin enteric coated 81 milliGRAM(s) Oral daily  carvedilol 3.125 milliGRAM(s) Oral every 12 hours  cholecalciferol 1000 Unit(s) Oral daily  cyanocobalamin 1000 MICROGram(s) Oral daily  dapagliflozin 5 milliGRAM(s) Oral every 24 hours  heparin   Injectable 5000 Unit(s) SubCutaneous every 12 hours  isosorbide   dinitrate Tablet (ISORDIL) 5 milliGRAM(s) Oral three times a day  levothyroxine 125 MICROGram(s) Oral daily  multivitamin/minerals 1 Tablet(s) Oral daily  omega-3-Acid Ethyl Esters 3 Gram(s) Oral daily    MEDICATIONS  (PRN):  acetaminophen     Tablet .. 650 milliGRAM(s) Oral every 6 hours PRN Mild Pain (1 - 3)  ondansetron Injectable 4 milliGRAM(s) IV Push every 6 hours PRN Nausea and/or Vomiting                                    Chief Complaint: Chest discomfort    Interval Hx: Patient is now asymptomatic, feeling well no complaints, eager to be discharged home.     ROS: Multi system review is comprehensively negative x 10 systems     Vitals:  T(F): 97.9 (09 May 2023 07:53), Max: 98.3 (08 May 2023 22:57)  HR: 71 (09 May 2023 07:53) (60 - 87)  BP: 106/57 (09 May 2023 07:53) (82/55 - 106/72)  RR: 18 (09 May 2023 07:53) (16 - 19)  SpO2: 98% (09 May 2023 07:53) (98% - 100%) on room air    Exam:  Gen: Comfortable  HEENT: NCAT PERRL EOMI MMM clear oropharynx  Neck: Supple, no JVD, no LAD, no thyromegaly  CVS: s1 s2 normal, RRR  Chest: Normal resp effort, lungs CTA B/L  Abd: +BS, soft, NT, ND  Ext: No edema or calf tenderness  Skin: Warm, dry  Mood: Calm, pleasant  Neuro: A+Ox,3 no deficits.    Labs:                       13.8   7.48 )-----------( 211              41.3       136  |  103  |  30  ----------------------<  84  4.8   |   29   |  1.23    Ca 9.1  Mg 2.2    TPro  7.2  /  Alb  3.1  /  TBili  1.4  /  DBili  x   /  AST  32  /  ALT  19  /  AlkPhos  147    PT: 12.5 sec;   INR: 1.08 ratio  PTT: 34.4 sec    Troponin negative x 3  proBNP 7313 (down from > 13,000 last year)    A1c 5.7    HDL 47  Trig 139    Imaging:  CXR 5/8: Normal cardiac silhouette and normal pulmonary vasculature with no consolidation, effusion, pneumothorax or acute osseous finding. Pacemaker with wire tips in the right atrium and right ventricle, unchanged.    Cardiac Testing:  AICD interrogation 5/9: No recorded events. Normal optivol.     TTE 5/9: Left ventricle is dilated and systolic function appears severely impaired; segmental wall motion abnormalities noted. Estimated Ejection Fraction is 20-25%. Findings similar to prior study dated 12/17/2020. The left atrium is mildly dilated. A device wire is seen in the RV and RA. RV size and contractility appear normal. There is calcification of both mitral valve leaflets. The leaflet opening is normal. Mild to Moderate mitral regurgitation is present. Mild to Moderate Tricuspid regurgitation is present. Severe pulmonary hypertension. No evidence of pericardial effusion.    EKG 5/8: V paced, rate 76    Meds:  MEDICATIONS  (STANDING):  aspirin enteric coated 81 milliGRAM(s) Oral daily  carvedilol 3.125 milliGRAM(s) Oral every 12 hours  cholecalciferol 1000 Unit(s) Oral daily  cyanocobalamin 1000 MICROGram(s) Oral daily  dapagliflozin 5 milliGRAM(s) Oral every 24 hours  heparin   Injectable 5000 Unit(s) SubCutaneous every 12 hours  isosorbide   dinitrate Tablet (ISORDIL) 5 milliGRAM(s) Oral three times a day  levothyroxine 125 MICROGram(s) Oral daily  multivitamin/minerals 1 Tablet(s) Oral daily  omega-3-Acid Ethyl Esters 3 Gram(s) Oral daily    MEDICATIONS  (PRN):  acetaminophen     Tablet .. 650 milliGRAM(s) Oral every 6 hours PRN Mild Pain (1 - 3)  ondansetron Injectable 4 milliGRAM(s) IV Push every 6 hours PRN Nausea and/or Vomiting

## 2023-05-09 NOTE — PROGRESS NOTE ADULT - ASSESSMENT
88 yo man with HTN, CAD, chronic systolic CHF, hx of VT, AICD, hypothyroidism, gout and prostate cancer s/p seed implant, here for further evaluation after an episode of chest discomfort, started while he was eating at [employer], L sided, non radiating, difficult to characterize, had no associated complaints such as dyspnea, diaphoresis, nausea or palpitations. Discomfort lasted approx 1-2 hrs and then resolved. In the ED, vitals at [facility], BP 90s-100s/50s-60s, HR 60s-70s, RR 16-18, O2 WNL on room air. Exam unremarkable. Labs also unrevealing. Troponin negative x 3. BNP elevated but decreased compared to that of last year. EKG v-paced, rate WNL. CXR w/ normal cardiac silhouette and normal pulmonary vasculature with no consolidation, effusion, pneumothorax or acute osseous finding. Pacemaker with wire tips in the right atrium and right ventricle, unchanged. Patient was placed on tele, ordered for TTE, Cardiology consult, AICD interrogation, and triaged to .     Chest pain  Since resolved. Etiology unclear. Could have been gas, or musculoskeletal. Less likely cardiac or pulmonary in etiology. CXR clear. No signs of pneumonia, pleural effusion or pneumothorax. No signs or symptoms of PE. No evidence to suggest acute coronary syndrome. Troponin negative. EKG at baseline. No tele events. BNP abnormal but decreased compared to that of last year. No clinical signs of decompensated CHF. TTE done. Report pending. AICD interrogation pending.     HTN  BP actually a bit on soft side, continued Coreg but at 3.125mg BID.     Chronic systolic CHF  Breathing comfortably on room air. No chest congestion. No cough. No orthopnea. No leg edema. TTE report pending, for completeness. Continue Coreg, 3.125mg BID. Continue Farxiga 5mg every other day. Continue Isosorbide 5mg TID. Avoid added salt in diet. Monitor weight. Follow up with Cardiology as outpatient.     Hypothyroidism  Stable. Continue levothyroxine    Hx of VT  Per patient he is no longer on amiodarone. Continue Coreg. Outpatient follow up with Cardiology.      89 yo man with HTN, CAD, chronic systolic CHF, hx of VT, AICD, hypothyroidism, gout and prostate cancer s/p seed implant, here for further evaluation after an episode of chest discomfort, started while he was eating at [employer], L sided, non radiating, difficult to characterize, had no associated complaints such as dyspnea, diaphoresis, nausea or palpitations. Discomfort lasted approx 1-2 hrs and then resolved. [facility], BP 90s-100s/50s-60s, HR 60s-70s, RR 16-18, O2 WNL on room air. Exam unremarkable. Labs also unrevealing. Troponin negative x 3. BNP elevated but decreased compared to that of last year. EKG v-paced, rate WNL. CXR w/ normal cardiac silhouette and normal pulmonary vasculature with no consolidation, effusion, pneumothorax or acute osseous finding. Pacemaker with wire tips in the right atrium and right ventricle, unchanged. Patient was placed on tele, ordered for TTE, Cardiology consult, AICD interrogation, and triaged to .     Chest pain  Since resolved. Etiology unclear. Could have been gas, or musculoskeletal. Less likely cardiac or pulmonary in etiology. CXR clear. No signs of pneumonia, pleural effusion or pneumothorax. No signs or symptoms of PE. No evidence to suggest acute coronary syndrome. Troponin negative. EKG at baseline. No tele events. BNP abnormal but decreased compared to that of last year. No clinical signs of decompensated CHF. AICD interrogation done. No events recorded. Normal optivol. TTE done. Dilated LV with reduced systolic function, 20-25%. Findings similar to prior study dated 12/17/2020. LA mildly dilated. Mild to mod MR. Mild to Mod TR. Severe pHTN. No evidence of pericardial effusion.  - Will discuss further with Cardiology    Chronic systolic CHF  Breathing comfortably on room air. No chest congestion. No cough. No orthopnea. No leg edema. TTE done. LVEF 20-25% similar to 2020 study.   - For now, continue Coreg, 3.125mg BID, Farxiga 5mg every other day, Isosorbide 5mg TID.   - Will discuss further with Cardiology  - Avoid added salt in diet. Monitor weight.     Hypothyroidism  Stable.   - Continue levothyroxine    Hx of VT  Per patient he is no longer on amiodarone.   - Continue Coreg. Outpatient follow up with Cardiology.

## 2023-05-09 NOTE — DISCHARGE NOTE PROVIDER - NSDCMRMEDTOKEN_GEN_ALL_CORE_FT
Aspirin Enteric Coated 81 mg oral delayed release tablet: 1 tab(s) orally once a day   carvedilol 6.25 mg oral tablet: 0.5 tab(s) orally 2 times a day Please note that dose was reduced to 1/2 tablet twice a day (from 1 tab twice a day)  cyanocobalamin 1000 mcg oral tablet: 2 tab(s) orally once a day  dapagliflozin 5 mg oral tablet: 1 tablet orally every 48 hours  Fish Oil 1200 mg oral capsule: 1 cap(s) orally 3 times a day  furosemide 20 mg oral tablet: 1 tab(s) orally every other day, alternating with 40mg dose  furosemide 40 mg oral tablet: 1 tab(s) orally every other day, alternating with 20mg dose.   isosorbide dinitrate 5 mg oral tablet: 1 tablet orally 3 times a day  levothyroxine 125 mcg (0.125 mg) oral tablet: 1 orally once a day  PreserVision AREDS 2 oral capsule: 1 cap(s) orally once a day  Vitamin D3 1000 intl units oral tablet: 1 tab(s) orally once a day

## 2023-05-09 NOTE — DISCHARGE NOTE PROVIDER - NSDCCPCAREPLAN_GEN_ALL_CORE_FT
PRINCIPAL DISCHARGE DIAGNOSIS  Diagnosis: Chest discomfort  Assessment and Plan of Treatment: You were evaluated in the hospital after an episode of chest discomfort that you experienced. Reassuringly, the chest discomfort has resolved. Etoilogy is a bit unclear, however. Could have been gas, or alternatively, musculoskeletal in origin. Less likely cardiac or pulmonary in etiology. Chest x ray is clear. No signs of pneumonia, pleural effusion or pneumothorax. No signs or symptoms of pulmonary embolism. No evidence to suggest acute coronary syndrome (heart attack). Troponin cardiac enzyme blood work is negative. EKG is at baseline. No telemetry events. No recorded events on AICD interrogation. Normal Optivol and no clinical signs of decompensated congestive heart failure. TTE done, stable. You were seen by Cardiology cleared for discharge home. Please follow up with your PCP/Cardiology doctor as outpatient.      SECONDARY DISCHARGE DIAGNOSES  Diagnosis: Chronic systolic congestive heart failure  Assessment and Plan of Treatment: No signs of decompensation. You are breathing comfortably on room air. No chest congestion. No cough. No orthopnea. No leg edema. No events on AICD interrogation. Optivol normal. Echocardiogram done, stable. Continue Coreg, Farxiga, Isosorbide and furosemide. Avoid added salt in diet. Monitor weight. Follow up with Cardiology as outpatient.    Diagnosis: History of ventricular tachycardia  Assessment and Plan of Treatment: No cardiac events on telemetry monitoring here. AICD interrogation revealed no arrhythmia events. You report that you are no longer on amiodarone. Continue Coreg, albeit dose now at 0.5 tab of 6.25mg twice a day. Outpatient follow up with Cardiology.    Diagnosis: Hypothyroidism  Assessment and Plan of Treatment: Stable. Continue levothyroxine

## 2023-05-09 NOTE — DISCHARGE NOTE NURSING/CASE MANAGEMENT/SOCIAL WORK - PATIENT PORTAL LINK FT
You can access the FollowMyHealth Patient Portal offered by [facility] by registering at the following website: [email or web address]. By joining [employer]’s FollowMyHealth portal, you will also be able to view your health information using other applications (apps) compatible with our system.

## 2023-05-09 NOTE — H&P ADULT - HISTORY OF PRESENT ILLNESS
88 y/o M w/ PMH of HTN, CAD, v-tach, systolic CHF, AICD placement, hypothyroidism, gout, prostate cancer s/p seed therapy, p/w CP. Patient states CP started around 1:30pm when he was eating at [employer]. CP lasted about 1-2 hours, L sided close to where is AICD is, and did not radiate to arm / jaw / back. Patient went to Dr. Almanzar's office, and was referred to ED for further evaluation. States CP has completely resolved at this time. Denies diophoresis, SOB, cough, runny nose, sore throat, nausea, vomiting, abdominal pain, fever, chills.     PSH: R middle finger surgery, pilonidal cyst, prostate biopsy, tonsillectomy, AICD placement     Social Hx: Denies tobacco / etoh / drugs     Family Hx: Mother - breast cancer, father - MI

## 2023-05-09 NOTE — DISCHARGE NOTE PROVIDER - HOSPITAL COURSE
89 yo man with HTN, CAD, chronic systolic CHF, hx of VT, AICD, hypothyroidism, gout and prostate cancer s/p seed implant, here for further evaluation after an episode of chest discomfort, started while he was eating at [employer], L sided, non radiating, difficult to characterize, had no associated complaints such as dyspnea, diaphoresis, nausea or palpitations. Discomfort lasted approx 1-2 hrs and then resolved. In the ED, vitals at [facility], BP 90s-100s/50s-60s, HR 60s-70s, RR 16-18, O2 WNL on room air. Exam unremarkable. Labs also unrevealing. Troponin negative x 3. BNP elevated but decreased compared to that of last year. EKG v-paced, rate WNL. CXR w/ normal cardiac silhouette and normal pulmonary vasculature with no consolidation, effusion, pneumothorax or acute osseous finding. Pacemaker with wire tips in the right atrium and right ventricle, unchanged. Patient was placed on tele, ordered for TTE, Cardiology consult, AICD interrogation, and triaged to 3E.     Chest pain  Since resolved. Etiology unclear. Could have been gas, or musculoskeletal. Less likely cardiac or pulmonary in etiology. CXR clear. No signs of pneumonia, pleural effusion or pneumothorax. No signs or symptoms of PE. No evidence to suggest acute coronary syndrome. Troponin negative. EKG at baseline. No tele events. BNP abnormal but decreased compared to that of last year. No clinical signs of decompensated CHF. TTE done. Report pending. AICD interrogation done. Optivol WNL. No recorded events. Patient stable for discharge home. PCP and Cardiology outpatient follow up.     HTN  BP actually a bit on soft side, continued Coreg but at 3.125mg BID.     Chronic systolic CHF  Breathing comfortably on room air. No chest congestion. No cough. No orthopnea. No leg edema. No events on AICD interrogation. Optivol WNL. TTE report pending, for completeness. Continue Coreg, now at 3.125mg BID. Continue Farxiga 5mg every other day. Continue Isosorbide 5mg TID. Continue furosemide as per your usual regimen. Avoid added salt in diet. Monitor weight. Follow up with Cardiology as outpatient.     Hypothyroidism  Stable. Continue levothyroxine    Hx of VT  Per patient he is no longer on amiodarone. Continue Coreg. Outpatient follow up with Cardiology.       Discharge Exam:  Afebrile  /57  HR 71  RR 18  O2 98% on RA  Gen: Comfortable  HEENT: NCAT PERRL EOMI MMM clear oropharynx  Neck: Supple, no JVD, no LAD, no thyromegaly  CVS: s1 s2 normal, RRR  Chest: Normal resp effort, lungs CTA B/L  Abd: +BS, soft, NT, ND  Ext: No edema or calf tenderness  Skin: Warm, dry  Mood: Calm, pleasant  Neuro: A+Ox,3 no deficits.    Labs:                       13.8   7.48 )-----------( 211              41.3       136  |  103  |  30  ----------------------<  84  4.8   |   29   |  1.23    Ca 9.1  Mg 2.2    TPro  7.2  /  Alb  3.1  /  TBili  1.4  /  DBili  x   /  AST  32  /  ALT  19  /  AlkPhos  147    PT: 12.5 sec;   INR: 1.08 ratio  PTT: 34.4 sec    Troponin negative x 3  proBNP 7313 (down from > 13,000 last year)    A1c 5.7    HDL 47  Trig 139    Imaging:  CXR 5/8: Normal cardiac silhouette and normal pulmonary vasculature with no consolidation, effusion, pneumothorax or acute osseous finding. Pacemaker with wire tips in the right atrium and right ventricle, unchanged.    Cardiac Testing:  AICD interrogation 5/9: Dual chamber AICD with normal function, adequate sensing and pacing thresholds. Stored data reveals no events for review. Optivol WNL.     TTE 5/9: Report pending    EKG 5/8: V paced, rate 76   88 yo man with HTN, CAD, chronic systolic CHF, hx of VT, AICD, hypothyroidism, gout and prostate cancer s/p seed implant, here for further evaluation after an episode of chest discomfort, started while he was eating at [employer], L sided, non radiating, difficult to characterize, had no associated complaints such as dyspnea, diaphoresis, nausea or palpitations. Discomfort lasted approx 1-2 hrs and then resolved. In the ED, vitals at [facility], BP 90s-100s/50s-60s, HR 60s-70s, RR 16-18, O2 WNL on room air. Exam unremarkable. Labs also unrevealing. Troponin negative x 3. BNP elevated but decreased compared to that of last year. EKG v-paced, rate WNL. CXR w/ normal cardiac silhouette and normal pulmonary vasculature with no consolidation, effusion, pneumothorax or acute osseous finding. Pacemaker with wire tips in the right atrium and right ventricle, unchanged. Patient was placed on tele, ordered for TTE, Cardiology consult, AICD interrogation, and triaged to .     Chest pain  Since resolved. Etiology unclear. Could have been gas, or musculoskeletal. Less likely cardiac or pulmonary in etiology. CXR clear. No signs of pneumonia, pleural effusion or pneumothorax. No signs or symptoms of PE. No evidence to suggest acute coronary syndrome. Troponin negative. EKG at baseline. No tele events. BNP abnormal but decreased compared to that of last year. No clinical signs of decompensated CHF. AICD interrogation done. Optivol WNL. No recorded events.  TTE done. Dilated LV with reduced systolic function, 20-25%. Findings similar to prior study dated 12/17/2020. LA mildly dilated. Mild to mod MR. Mild to Mod TR. Severe pHTN. No evidence of pericardial effusion. Patient stable for discharge home. PCP and Cardiology outpatient follow up.     HTN  BP actually a bit on soft side, continued Coreg but at 3.125mg BID.     Chronic systolic CHF  Breathing comfortably on room air. No chest congestion. No cough. No orthopnea. No leg edema. No events on AICD interrogation. Optivol WNL. TTE with reduced LVEF, 20-25%, similar to 2020 TTE study. He is known to be unable to tolerate ACE /ARB/ Entresto due to hypotension. Continue Coreg, now at 3.125mg BID. Continue Farxiga 5mg every other day. Continue Isosorbide 5mg TID. Continue furosemide as per your usual regimen. Avoid added salt in diet. Monitor weight. Follow up with Cardiology as outpatient.     Hypothyroidism  Stable. Continue levothyroxine    Hx of VT  Per patient he is no longer on amiodarone. Continue Coreg. Outpatient follow up with Cardiology.       Discharge Exam:  Afebrile  /57  HR 71  RR 18  O2 98% on RA  Gen: Comfortable  HEENT: NCAT PERRL EOMI MMM clear oropharynx  Neck: Supple, no JVD, no LAD, no thyromegaly  CVS: s1 s2 normal, RRR  Chest: Normal resp effort, lungs CTA B/L  Abd: +BS, soft, NT, ND  Ext: No edema or calf tenderness  Skin: Warm, dry  Mood: Calm, pleasant  Neuro: A+Ox,3 no deficits.    Labs:                       13.8   7.48 )-----------( 211              41.3       136  |  103  |  30  ----------------------<  84  4.8   |   29   |  1.23    Ca 9.1  Mg 2.2    TPro  7.2  /  Alb  3.1  /  TBili  1.4  /  DBili  x   /  AST  32  /  ALT  19  /  AlkPhos  147    PT: 12.5 sec;   INR: 1.08 ratio  PTT: 34.4 sec    Troponin negative x 3  proBNP 7313 (down from > 13,000 last year)    A1c 5.7    HDL 47  Trig 139    Imaging:  CXR 5/8: Normal cardiac silhouette and normal pulmonary vasculature with no consolidation, effusion, pneumothorax or acute osseous finding. Pacemaker with wire tips in the right atrium and right ventricle, unchanged.    Cardiac Testing:  AICD interrogation 5/9: Dual chamber AICD with normal function, adequate sensing and pacing thresholds. Stored data reveals no events for review. Optivol WNL.     TTE 5/9: Dilated LV with reduced systolic function, 20-25%. Findings similar to prior study dated 12/17/2020. LA mildly dilated. Mild to mod MR. Mild to Mod TR. Severe pHTN.     EKG 5/8: V paced, rate 76

## 2023-05-09 NOTE — DISCHARGE NOTE PROVIDER - NSDCFUSCHEDAPPT_GEN_ALL_CORE_FT
[facility] Physician [address]  Scheduled Appointment: 06/13/2023

## 2023-05-09 NOTE — DISCHARGE NOTE PROVIDER - PROVIDER TOKENS
PROVIDER:[TOKEN:[[id number]],SCHEDULEDAPPT:[05/12/2023],SCHEDULEDAPPTTIME:[04:15 PM]],PROVIDER:[TOKEN:[[id number]],SCHEDULEDAPPT:[06/13/2023]]

## 2023-05-09 NOTE — PHARMACOTHERAPY INTERVENTION NOTE - COMMENTS
patient has hypothyroidism - recommended TSH as last one in our system is from June 2022
spoke with patient and completed med rec; confirmed medications by utilizing Dr. Duong as well as calling pharmacy. [facility] patient states he no longer takes amiodarone because he had a lot of side effects from it but doesn't remember what they are. 
continued home medications, carvedilol and levothyroxine 125mcg QD  reduced carvedilol to 3.25 BID while inpatient based on BP ~, will continue to monitor

## 2023-05-09 NOTE — H&P ADULT - ASSESSMENT
88 y/o M w/ PMH of HTN, CAD, v-tach, systolic CHF, AICD placement, hypothyroidism, gout, prostate cancer s/p seed therapy, p/w CP    *CP w/ h/o CAD  -ASA / Statin  -Trend troponins   -Cardio consult  -Tele monitoring  -Echo  -EKG:   -EP to interrogate AICD     *Suspect GABO  -Hold lasix overnight, recheck creatinine in AM  -Avoid nephrotoxic agents  -UA  -I/Os     *H/o V-tach / Systolic CHF   -Uncertain if patient is on Pacerone / coreg. As per previous discharge history patient was on pacerone & coreg, however, most recent medication refill history from [facility] does not have pacerone. Will f/u cardio    *H/o HTN /  systolic CHF / AICD placement / hypothyroidism / gout / prostate cancer   -C/w home meds and f/u outpatient for further management if conditions remain stable during hospitalization    *DVT ppx  -Heparin SubQ    88 y/o M w/ PMH of HTN, CAD, v-tach, systolic CHF, AICD placement, hypothyroidism, gout, prostate cancer s/p seed therapy, p/w CP    *CP w/ h/o CAD  -ASA / Statin  -Trend troponins   -Cardio consult  -Tele monitoring  -Echo  -EKG   -EP to interrogate AICD     *Suspect GABO  -Hold lasix overnight, recheck creatinine in AM  -Avoid nephrotoxic agents  -UA  -I/Os     *H/o V-tach / Systolic CHF   -Uncertain if patient is on Pacerone / coreg. As per previous discharge history patient was on pacerone & coreg, however, most recent medication refill history from [facility] does not have pacerone. Will f/u cardio    *H/o HTN /  systolic CHF / AICD placement / hypothyroidism / gout / prostate cancer   -C/w home meds and f/u outpatient for further management if conditions remain stable during hospitalization    *DVT ppx  -Heparin SubQ

## 2023-05-09 NOTE — PROCEDURE NOTE - ADDITIONAL PROCEDURE DETAILS
Dual chamber AICD with normal function, adequate sensing and pacing thresholds.  Stored data reveals no events for review. Optivol WNL.  Recommend routine device follow up in three months

## 2023-05-09 NOTE — CONSULT NOTE ADULT - SUBJECTIVE AND OBJECTIVE BOX
CHIEF COMPLAINT: Patient is a 89y old  Male who presents with a chief complaint of CP (09 May 2023 01:34)      HPI:    88 y/o M w/ PMH of HTN, CAD, v-tach, systolic CHF, AICD placement, hypothyroidism, gout, prostate cancer s/p seed therapy, p/w CP. Patient states CP started around 1:30pm when he was eating at [employer]. CP lasted about 1-2 hours, L sided close to where is AICD is, and did not radiate to arm / jaw / back. Patient went to Dr. Almanzar's office, and was referred to ED for further evaluation. States CP has completely resolved at this time. Denies diophoresis, SOB, cough, runny nose, sore throat, nausea, vomiting, abdominal pain, fever, chills.     chest pain resolved, no dyspnea, was seen walking the hallways this morning with the nurse, no dizziness-- states " i am feeling well"      PSH: R middle finger surgery, pilonidal cyst, prostate biopsy, tonsillectomy, AICD placement     Social Hx: Denies tobacco / etoh / drugs     Family Hx: Mother - breast cancer, father - MI      (09 May 2023 01:34)      PMHx: PAST MEDICAL & SURGICAL HISTORY:  Hypertension      H/O ventricular tachycardia  PPM placed 2008      Myocardial infarction  54 years old      Prostate cancer      Implantable cardioverter-defibrillator (ICD) in situ  2008      History of tonsillectomy  as child      Elective surgery  right middle finger surgery, no hardware      H/O pilonidal cyst      H/O prostate biopsy  cancer treated with prostate seeding            Soc Hx: , no toxic habits      Allergies: Allergies    mexiletine (Unknown)  statins (Rash)  dexamethasone (Hives)    Intolerances        Vital Signs Last 24 Hrs  T(C): 36.8 (08 May 2023 22:57), Max: 36.8 (08 May 2023 22:57)  T(F): 98.3 (08 May 2023 22:57), Max: 98.3 (08 May 2023 22:57)  HR: 87 (09 May 2023 06:02) (60 - 87)  BP: 91/51 (09 May 2023 06:02) (82/55 - 106/72)  BP(mean): 61 (08 May 2023 20:30) (61 - 68)  RR: 18 (08 May 2023 22:57) (16 - 19)  SpO2: 100% (08 May 2023 22:57) (100% - 100%)    Parameters below as of 08 May 2023 22:57  Patient On (Oxygen Delivery Method): room air        I&O's Summary          PHYSICAL EXAM:   Constitutional: NAD, awake and alert, well-developed  HEENT: PERR, EOMI, Normal Hearing, MMM  Neck: Soft and supple, No LAD, No JVD  Respiratory: Breath sounds are clear bilaterally, No wheezing, rales or rhonchi  Cardiovascular: S1 and S2, regular rate and rhythm, HSM ICD upper chest  Gastrointestinal: Bowel Sounds present, soft, nontender, nondistended, no guarding, no rebound  Extremities: No peripheral edema  Vascular: 2+ peripheral pulses  Neurological: A/O x 3, no focal deficits  Musculoskeletal: 5/5 strength b/l upper and lower extremities      MEDICATIONS:  MEDICATIONS  (STANDING):  aspirin enteric coated 81 milliGRAM(s) Oral daily  cholecalciferol 1000 Unit(s) Oral daily  cyanocobalamin 1000 MICROGram(s) Oral daily  dapagliflozin 5 milliGRAM(s) Oral every 24 hours  heparin   Injectable 5000 Unit(s) SubCutaneous every 12 hours  isosorbide   dinitrate Tablet (ISORDIL) 5 milliGRAM(s) Oral three times a day  multivitamin/minerals 1 Tablet(s) Oral daily  omega-3-Acid Ethyl Esters 3 Gram(s) Oral daily  sodium chloride 0.9% Bolus 500 milliLiter(s) IV Bolus once      LABS: All Labs Reviewed:                        13.1   7.20  )-----------( 214      ( 08 May 2023 17:28 )             39.3     05-08    137  |  99  |  35<H>  ----------------------------<  98  3.9   |  33<H>  |  1.42<H>    Ca    9.2      08 May 2023 17:28  Mg     2.2     05-08    TPro  7.6  /  Alb  3.4  /  TBili  1.1  /  DBili  x   /  AST  19  /  ALT  19  /  AlkPhos  163<H>  05-08    PT/INR - ( 08 May 2023 17:28 )   PT: 12.5 sec;   INR: 1.08 ratio         PTT - ( 08 May 2023 17:28 )  PTT:34.4 sec          EKG:V paced    Telemetry: Paced    ECHO: pending

## 2023-05-09 NOTE — DISCHARGE NOTE PROVIDER - NSDCCAREPROVSEEN_GEN_ALL_CORE_FT
Phillip Vang ([facility])  Herson Almanzar (Cardiology)  Ce Chacon ([facility])  Tyrel Mcgee ([facility])  Tate Morris (Cardiac Electrophysiology)

## 2023-05-09 NOTE — DISCHARGE NOTE PROVIDER - CARE PROVIDER_API CALL
Herson Almanzar (MD)  Cardiovascular Disease; Internal Medicine; Nuclear Cardiology  [address], [address]  [place] [address]  Phone: [phone]  Fax: [phone]  Scheduled Appointment: 05/12/2023 04:15 PM    Aris Jackson)  Cardiac Electrophysiology; Cardiovascular Disease  [address] [place] [address]  Phone: [phone]  Fax: [phone]  Scheduled Appointment: 06/13/2023

## 2023-05-17 DIAGNOSIS — Z95.810 PRESENCE OF AUTOMATIC (IMPLANTABLE) CARDIAC DEFIBRILLATOR: ICD-10-CM

## 2023-05-17 DIAGNOSIS — I50.22 CHRONIC SYSTOLIC (CONGESTIVE) HEART FAILURE: ICD-10-CM

## 2023-05-17 DIAGNOSIS — R07.89 OTHER CHEST PAIN: ICD-10-CM

## 2023-05-17 DIAGNOSIS — M10.9 GOUT, UNSPECIFIED: ICD-10-CM

## 2023-05-17 DIAGNOSIS — E03.9 HYPOTHYROIDISM, UNSPECIFIED: ICD-10-CM

## 2023-05-17 DIAGNOSIS — Z79.82 LONG TERM (CURRENT) USE OF ASPIRIN: ICD-10-CM

## 2023-05-17 DIAGNOSIS — I11.0 HYPERTENSIVE HEART DISEASE WITH HEART FAILURE: ICD-10-CM

## 2023-05-17 DIAGNOSIS — I27.20 PULMONARY HYPERTENSION, UNSPECIFIED: ICD-10-CM

## 2023-05-17 DIAGNOSIS — I25.2 OLD MYOCARDIAL INFARCTION: ICD-10-CM

## 2023-05-17 DIAGNOSIS — I08.1 RHEUMATIC DISORDERS OF BOTH MITRAL AND TRICUSPID VALVES: ICD-10-CM

## 2023-05-17 DIAGNOSIS — Z88.8 ALLERGY STATUS TO OTHER DRUGS, MEDICAMENTS AND BIOLOGICAL SUBSTANCES: ICD-10-CM

## 2023-05-17 DIAGNOSIS — Z85.46 PERSONAL HISTORY OF MALIGNANT NEOPLASM OF PROSTATE: ICD-10-CM

## 2023-05-17 DIAGNOSIS — Z79.890 HORMONE REPLACEMENT THERAPY: ICD-10-CM

## 2023-05-20 NOTE — ED ADULT NURSE NOTE - NS ED NURSE REPORT GIVEN TO FT
Divina WEISS
You can access the FollowMyHealth Patient Portal offered by [facility] by registering at the following website: [email or web address]. By joining [employer]’s FollowMyHealth portal, you will also be able to view your health information using other applications (apps) compatible with our system.

## 2023-06-05 NOTE — CONSULT NOTE ADULT - EXTREMITIES
No cyanosis, clubbing or edema Metronidazole Pregnancy And Lactation Text: This medication is Pregnancy Category B and considered safe during pregnancy.  It is also excreted in breast milk.

## 2023-09-12 PROBLEM — Z86.79 H/O VENTRICULAR TACHYCARDIA: Status: ACTIVE | Noted: 2017-08-16

## 2023-09-12 PROBLEM — Z95.810 ICD (IMPLANTABLE CARDIOVERTER-DEFIBRILLATOR) IN PLACE: Status: ACTIVE | Noted: 2017-08-16

## 2023-12-04 NOTE — H&P ADULT - NSHPPHYSICALEXAM_GEN_ALL_CORE
PHYSICAL EXAM:  Constitutional: NAD, awake and alert  Neurological: AAO x 3, no focal deficits  HEENT: PERRLA, EOMI, MMM  Neck: Soft and supple, No LAD, No JVD  Respiratory: Breath sounds are clear bilaterally, No wheezing, rales or rhonchi  Cardiovascular: S1 and S2, regular rate and rhythm; no Murmurs, gallops or rubs  Gastrointestinal: Bowel Sounds present, soft, nontender, nondistended, no guarding, no rebound tenderness  Back: No CVA tenderness   Extremities: + peripheral edema B/L up to stomach  Vascular: 2+ peripheral pulses  Musculoskeletal: 5/5 strength b/l upper and lower extremities  Skin: No rashes  Breast: Deferred  Rectal: Deferred

## 2023-12-04 NOTE — ED PROVIDER NOTE - CLINICAL SUMMARY MEDICAL DECISION MAKING FREE TEXT BOX
Other male history of heart failure on Lasix at home coming in with 50 pound weight gain worsening peripheral edema and shortness of breath.  Vitals are normal no hypoxia.  On exam no respiratory distress.  He does have +4 pitting edema lower extremities.  Will give Lasix and admit to medicine for diuresis.  He does have GABO on lab workup.    EKG isV paced rhythm.

## 2023-12-04 NOTE — ED PROVIDER NOTE - SKIN, MLM
Skin normal color for race, warm, dry and intact. No evidence of rash.  bilateral pitting edema to lower extremities.

## 2023-12-04 NOTE — H&P ADULT - NSHPLABSRESULTS_GEN_ALL_CORE
Lab Results:  CBC  CBC Full  -  ( 04 Dec 2023 11:15 )  WBC Count : 8.74 K/uL  RBC Count : 4.21 M/uL  Hemoglobin : 12.5 g/dL  Hematocrit : 38.0 %  Platelet Count - Automated : 207 K/uL  Mean Cell Volume : 90.3 fl  Mean Cell Hemoglobin : 29.7 pg  Mean Cell Hemoglobin Concentration : 32.9 gm/dL  Auto Neutrophil # : 6.55 K/uL  Auto Lymphocyte # : 0.91 K/uL  Auto Monocyte # : 0.89 K/uL  Auto Eosinophil # : 0.28 K/uL  Auto Basophil # : 0.07 K/uL  Auto Neutrophil % : 74.9 %  Auto Lymphocyte % : 10.4 %  Auto Monocyte % : 10.2 %  Auto Eosinophil % : 3.2 %  Auto Basophil % : 0.8 %    .		Differential:	[] Automated		[] Manual  Chemistry                        12.5   8.74  )-----------( 207      ( 04 Dec 2023 11:15 )             38.0     12-04    136  |  101  |  49<H>  ----------------------------<  104<H>  4.1   |  30  |  2.06<H>    Ca    8.8      04 Dec 2023 11:15    TPro  6.4  /  Alb  2.8<L>  /  TBili  2.0<H>  /  DBili  x   /  AST  34  /  ALT  31  /  AlkPhos  157<H>  12-04    LIVER FUNCTIONS - ( 04 Dec 2023 11:15 )  Alb: 2.8 g/dL / Pro: 6.4 gm/dL / ALK PHOS: 157 U/L / ALT: 31 U/L / AST: 34 U/L / GGT: x             Urinalysis Basic - ( 04 Dec 2023 11:15 )    Color: x / Appearance: x / SG: x / pH: x  Gluc: 104 mg/dL / Ketone: x  / Bili: x / Urobili: x   Blood: x / Protein: x / Nitrite: x   Leuk Esterase: x / RBC: x / WBC x   Sq Epi: x / Non Sq Epi: x / Bacteria: x      RADIOLOGY RESULTS:    Prelim EKg not done in the ER     Prelim CXR +pulm congestion

## 2023-12-04 NOTE — H&P ADULT - CONVERSATION DETAILS
Advanced Care Planning 47 minutes.  Discussion with patient and wife  regarding advance directives. We discussed diagnosis, prognosis and goals of care. At this time he wishes to be fully resuscitated and mechanically ventilated if need arises. There are no limitations of any sort in his medical care and management. he is FULL CODE.

## 2023-12-04 NOTE — ED ADULT NURSE REASSESSMENT NOTE - NS ED NURSE REASSESS COMMENT FT1
received from Amina alvarado awake alert cooperative to care denies any pain or discomfort, wife at bedside.

## 2023-12-04 NOTE — PATIENT PROFILE ADULT - FALL HARM RISK - HARM RISK INTERVENTIONS
Assistance OOB with selected safe patient handling equipment/Communicate Risk of Fall with Harm to all staff/Discuss with provider need for PT consult/Monitor gait and stability/Provide patient with walking aids - walker, cane, crutches/Reinforce activity limits and safety measures with patient and family/Tailored Fall Risk Interventions/Visual Cue: Yellow wristband and red socks/Bed in lowest position, wheels locked, appropriate side rails in place/Call bell, personal items and telephone in reach/Instruct patient to call for assistance before getting out of bed or chair/Non-slip footwear when patient is out of bed/[place] to call system/Physically safe environment - no spills, clutter or unnecessary equipment/Purposeful Proactive Rounding/Room/bathroom lighting operational, light cord in reach Assistance OOB with selected safe patient handling equipment/Communicate Risk of Fall with Harm to all staff/Discuss with provider need for PT consult/Monitor gait and stability/Provide patient with walking aids - walker, cane, crutches/Reinforce activity limits and safety measures with patient and family/Tailored Fall Risk Interventions/Visual Cue: Yellow wristband and red socks/Bed in lowest position, wheels locked, appropriate side rails in place/Call bell, personal items and telephone in reach/Instruct patient to call for assistance before getting out of bed or chair/Non-slip footwear when patient is out of bed/[place] to call system/Physically safe environment - no spills, clutter or unnecessary equipment/Purposeful Proactive Rounding/Room/bathroom lighting operational, light cord in reach

## 2023-12-04 NOTE — H&P ADULT - HISTORY OF PRESENT ILLNESS
89 y/o M w/ PMH of HTN, CAD, v-tach, systolic CHF, AICD placement, hypothyroidism, gout, prostate cancer s/p seed therapy p/w dyspnea the past few days. Patient compliant with lasix in discussed with Dr Almanzar with adjusting his lasix dose. Patient was Sent in today for weight gain,  dyspnea on exertion andedema stomacho down.  Patient not urinating much despite taking Lasix.  No chest pain, palpitations, syncope, cough, fever or  sick contacts at home.    In the ER, Patient recevied IV lasix.

## 2023-12-04 NOTE — ED PROVIDER NOTE - OBJECTIVE STATEMENT
89-year-old male history of heart failure on Lasix compliant with his medications his cardiologist name is Dr. Almanzar.  Sent in today for weight gain dyspnea on exertion fluid overload not urinating much despite taking Lasix.  No chest pain.  No LOC.  No sick contacts at home.  Feeling

## 2023-12-04 NOTE — H&P ADULT - ASSESSMENT
89 y/o M w/ PMH of HTN, CAD, v-tach, systolic CHF, AICD placement, hypothyroidism, gout, prostate cancer s/p seed therapy, p/w Dyspnea    #Dyspnea 2ndry to Acute on Chronic Systolic CHF   -Admit to tele   -serial trop and EKG  -Daily weights, Monitor I&O  -IV lasix 20 Q8H monitor BUN/Cr  -Cardio consult  -Echo FU  -EKG     #GABO  -monitor closely  with Lasix IV     *H/o V-tach   -continue coreg    *H/o HTN / AICD placement / hypothyroidism / gout / prostate cancer   -C/w home meds and f/u outpatient for further management if conditions remain stable during hospitalization    *DVT ppx  -SCDS

## 2023-12-04 NOTE — ED ADULT NURSE NOTE - OBJECTIVE STATEMENT
pt presents to Ed with complaints of SOB worsening with exertion. pt endorses approximately 15 lb weight gain in 1-2 weeks time. pt has hx of CHF on lasix. follows with MD Almanzar. 18 g placed to left FA. labs sent. EKG performed. cardiac monitoring initiated.

## 2023-12-04 NOTE — ED ADULT NURSE NOTE - CHIEF COMPLAINT QUOTE
Patient presents to the ER with complaints of dyspnea on exertion, 15 lb wt gain in 1-2 weeks, abdominal distention, bilateral lower extremity edema. Patient with hx: CHF on lasix 40mg daily, Cardio- Dr. Almanzar.

## 2023-12-04 NOTE — ED ADULT NURSE NOTE - NSFALLHARMRISKINTERV_ED_ALL_ED
Communicate risk of Fall with Harm to all staff, patient, and family/Provide visual cue: red socks, yellow wristband, yellow gown, etc/Reinforce activity limits and safety measures with patient and family/Bed in lowest position, wheels locked, appropriate side rails in place/Call bell, personal items and telephone in reach/Instruct patient to call for assistance before getting out of bed/chair/stretcher/Non-slip footwear applied when patient is off stretcher/[place] to call system/Physically safe environment - no spills, clutter or unnecessary equipment/Purposeful Proactive Rounding/Room/bathroom lighting operational, light cord in reach Communicate risk of Fall with Harm to all staff, patient, and family/Provide visual cue: red socks, yellow wristband, yellow gown, etc/Reinforce activity limits and safety measures with patient and family/Bed in lowest position, wheels locked, appropriate side rails in place/Call bell, personal items and telephone in reach/Instruct patient to call for assistance before getting out of bed/chair/stretcher/Non-slip footwear applied when patient is off stretcher/[place] to call system/Physically safe environment - no spills, clutter or unnecessary equipment/Purposeful Proactive Rounding/Room/bathroom lighting operational, light cord in reach

## 2023-12-04 NOTE — ED ADULT TRIAGE NOTE - CHIEF COMPLAINT QUOTE
Patient presents to the ER with complaints of dyspnea on exertion, 15 lb wt gain in 1-2 weeks, abdominal distention, bilateral lower extremity edema. Patient with hx: CHF on lasix 40mg daily, Cardio- Dr. Almanzar. Patient presents to the ER with complaints of dyspnea on exertion, 15 lb wt gain in 1-2 weeks, abdominal distention, bilateral lower extremity edema. Patient with hx: CHF on lasix 40mg daily, Cardio- Dr. Almanzra.

## 2023-12-05 NOTE — CONSULT NOTE ADULT - SUBJECTIVE AND OBJECTIVE BOX
HPI: Pt is a 89y old Male with hx of HTN, CAD, v-tach, systolic CHF, AICD placement, hypothyroidism, gout, prostate cancer s/p seed therapy p/w dyspnea the past few days. Patient compliant with lasix in discussed with Dr Almanzar with adjusting his lasix dose. Patient was Sent in today for weight gain,  dyspnea on exertion andedema stomacho down.  Patient not urinating much despite taking Lasix.  No chest pain, palpitations, syncope, cough, fever or  sick contacts at home. Palliative consulted for GOC.    : Seen and examined with wife at bedside. Awake, alert, very pleasant in NAD. Denies pain and SOB, on room air. Discussed GOC with patient and wife.        PAIN: ( )Yes   (X)No  denies pain    DYSPNEA: ( ) Yes  (X) No  denies dyspnea, on room air in NAD      PAST MEDICAL & SURGICAL HISTORY:  Hypertension  H/O ventricular tachycardia  PPM placed   Myocardial infarction  54 years old  Prostate cancer  Implantable cardioverter-defibrillator (ICD) in situ    History of tonsillectomy  as child  Elective surgery  right middle finger surgery, no hardware  H/O pilonidal cyst  H/O prostate biopsy  cancer treated with prostate seeding      SOCIAL HX:    Hx opiate tolerance ( )YES  ( )NO    Baseline ADLs  (Prior to Admission)  ( ) Independent   (X)Dependent    FAMILY HISTORY:  FHx: breast cancer  mother    FHx: myocardial infarction  father      Review of Systems:    Anxiety- denies  Depression- denies  Physical Discomfort- denies  Dyspnea- denies  Constipation- denies  Diarrhea- ++ at times  Nausea- denies  Vomiting- denies  Anorexia- ++  Weight Loss-   Cough- denies  Secretions- denies  Fatigue- denies  Weakness- ++  Delirium- denies    All other systems reviewed and negative  Unable to obtain/Limited due to:      PHYSICAL EXAM:    Vital Signs Last 24 Hrs  T(C): 36.5 (05 Dec 2023 07:50), Max: 36.6 (04 Dec 2023 19:00)  T(F): 97.7 (05 Dec 2023 07:50), Max: 97.8 (04 Dec 2023 19:00)  HR: 62 (05 Dec 2023 14:41) (59 - 100)  BP: 84/55 (05 Dec 2023 14:41) (84/55 - 104/65)  BP(mean): --  RR: 18 (05 Dec 2023 07:50) (18 - 18)  SpO2: 92% (05 Dec 2023 07:50) (92% - 97%)    Parameters below as of 05 Dec 2023 07:50  Patient On (Oxygen Delivery Method): room air      Daily     Daily Weight in k.6 (05 Dec 2023 07:03)    PPSV2:  30%      General: Awake, alert, pleasant/conversational. In NAD.  Mental Status: A&Ox4.  HEENT: Normocephalic, atraumatic. Conjunctivae clear, EOM intact, PERRL.  Lungs: CTA bilaterally.  Cardiac: PPM/AICD  GI: Abdomen distended. +BSx4.  : No suprapubic tenderness.  Ext: +2 pitting lower extremity edema.  Neuro: A&OX4. Speech intact. No focal neuro deficits.      LABS:                        12.3   7.98  )-----------( 196      ( 05 Dec 2023 07:01 )             36.6     12-    140  |  104  |  47<H>  ----------------------------<  106<H>  3.7   |  29  |  1.94<H>    Ca    9.0      05 Dec 2023 07:01  Phos  2.9     12-  Mg     2.5     12-    TPro  6.4  /  Alb  2.8<L>  /  TBili  2.0<H>  /  DBili  x   /  AST  34  /  ALT  31  /  AlkPhos  157<H>  12-04      Albumin: Albumin: 2.8 g/dL ( @ 11:15)      Allergies    mexiletine (Unknown)  statins (Rash)  dexamethasone (Hives)    Intolerances      MEDICATIONS  (STANDING):  allopurinol 100 milliGRAM(s) Oral two times a day  aMIOdarone    Tablet 200 milliGRAM(s) Oral daily  aspirin enteric coated 81 milliGRAM(s) Oral daily  carvedilol 9.375 milliGRAM(s) Oral every 12 hours  cholecalciferol 1000 Unit(s) Oral daily  cyanocobalamin 1000 MICROGram(s) Oral daily  furosemide   Injectable 20 milliGRAM(s) IV Push every 8 hours  influenza  Vaccine (HIGH DOSE) 0.7 milliLiter(s) IntraMuscular once  isosorbide   dinitrate Tablet (ISORDIL) 5 milliGRAM(s) Oral two times a day  levothyroxine 125 MICROGram(s) Oral <User Schedule>  levothyroxine 187.5 MICROGram(s) Oral <User Schedule>    MEDICATIONS  (PRN):  acetaminophen     Tablet .. 650 milliGRAM(s) Oral every 6 hours PRN Mild Pain (1 - 3)      RADIOLOGY/ADDITIONAL STUDIES:  < from: Xray Chest 1 View- PORTABLE-Urgent (23 @ 12:36) >    ACC: [id number] EXAM:  XR CHEST PORTABLE URGENT 1V   ORDERED BY: BENTON PABLO     PROCEDURE DATE:  2023          INTERPRETATION:  Chest pain.    AP chest. Prior 2023    IMPRESSION: Left ICD reidentified in position. Heart magnified by   projection. New vascular congestion and bilateral interstitial edema and   right pleural effusion. Correlate clinically for concomitant infection.    --- End of Report ---            RAJANI MARKS MD; Attending Radiologist  This document has been electronically signed. Dec  5 2023  1:36PM    < end of copied text >   HPI: Pt is a 89y old Male with hx of HTN, CAD, v-tach, systolic CHF, AICD placement, hypothyroidism, gout, prostate cancer s/p seed therapy p/w dyspnea the past few days. Patient compliant with lasix in discussed with Dr Almanzar with adjusting his lasix dose. Patient was Sent in today for weight gain,  dyspnea on exertion andedema stomacho down.  Patient not urinating much despite taking Lasix.  No chest pain, palpitations, syncope, cough, fever or  sick contacts at home. Palliative consulted for GOC.    : Seen and examined with wife at bedside. Awake, alert, very pleasant in NAD. Denies pain and SOB, on room air. Discussed GOC with patient and wife.        PAIN: ( )Yes   (X)No  denies pain    DYSPNEA: ( ) Yes  (X) No  denies dyspnea, on room air in NAD      PAST MEDICAL & SURGICAL HISTORY:  Hypertension  H/O ventricular tachycardia  PPM placed   Myocardial infarction  54 years old  Prostate cancer  Implantable cardioverter-defibrillator (ICD) in situ    History of tonsillectomy  as child  Elective surgery  right middle finger surgery, no hardware  H/O pilonidal cyst  H/O prostate biopsy  cancer treated with prostate seeding      SOCIAL HX:    Hx opiate tolerance ( )YES  ( )NO    Baseline ADLs  (Prior to Admission)  ( ) Independent   (X)Dependent    FAMILY HISTORY:  FHx: breast cancer  mother    FHx: myocardial infarction  father      Review of Systems:    Anxiety- denies  Depression- denies  Physical Discomfort- denies  Dyspnea- denies  Constipation- denies  Diarrhea- ++ at times  Nausea- denies  Vomiting- denies  Anorexia- ++  Weight Loss-   Cough- denies  Secretions- denies  Fatigue- denies  Weakness- ++  Delirium- denies    All other systems reviewed and negative  Unable to obtain/Limited due to:      PHYSICAL EXAM:    Vital Signs Last 24 Hrs  T(C): 36.5 (05 Dec 2023 07:50), Max: 36.6 (04 Dec 2023 19:00)  T(F): 97.7 (05 Dec 2023 07:50), Max: 97.8 (04 Dec 2023 19:00)  HR: 62 (05 Dec 2023 14:41) (59 - 100)  BP: 84/55 (05 Dec 2023 14:41) (84/55 - 104/65)  BP(mean): --  RR: 18 (05 Dec 2023 07:50) (18 - 18)  SpO2: 92% (05 Dec 2023 07:50) (92% - 97%)    Parameters below as of 05 Dec 2023 07:50  Patient On (Oxygen Delivery Method): room air      Daily     Daily Weight in k.6 (05 Dec 2023 07:03)    PPSV2:  30%      General: Awake, alert, pleasant/conversational. In NAD.  Mental Status: A&Ox4.  HEENT: Normocephalic, atraumatic. Conjunctivae clear, EOM intact, PERRL.  Lungs: CTA bilaterally.  Cardiac: PPM/AICD  GI: Abdomen distended. +BSx4.  : No suprapubic tenderness.  Ext: +2 pitting lower extremity edema.  Neuro: A&OX4. Speech intact. No focal neuro deficits.      LABS:                        12.3   7.98  )-----------( 196      ( 05 Dec 2023 07:01 )             36.6     12-    140  |  104  |  47<H>  ----------------------------<  106<H>  3.7   |  29  |  1.94<H>    Ca    9.0      05 Dec 2023 07:01  Phos  2.9     12-  Mg     2.5     12-    TPro  6.4  /  Alb  2.8<L>  /  TBili  2.0<H>  /  DBili  x   /  AST  34  /  ALT  31  /  AlkPhos  157<H>  12-04      Albumin: Albumin: 2.8 g/dL ( @ 11:15)      Allergies    mexiletine (Unknown)  statins (Rash)  dexamethasone (Hives)    Intolerances      MEDICATIONS  (STANDING):  allopurinol 100 milliGRAM(s) Oral two times a day  aMIOdarone    Tablet 200 milliGRAM(s) Oral daily  aspirin enteric coated 81 milliGRAM(s) Oral daily  carvedilol 9.375 milliGRAM(s) Oral every 12 hours  cholecalciferol 1000 Unit(s) Oral daily  cyanocobalamin 1000 MICROGram(s) Oral daily  furosemide   Injectable 20 milliGRAM(s) IV Push every 8 hours  influenza  Vaccine (HIGH DOSE) 0.7 milliLiter(s) IntraMuscular once  isosorbide   dinitrate Tablet (ISORDIL) 5 milliGRAM(s) Oral two times a day  levothyroxine 125 MICROGram(s) Oral <User Schedule>  levothyroxine 187.5 MICROGram(s) Oral <User Schedule>    MEDICATIONS  (PRN):  acetaminophen     Tablet .. 650 milliGRAM(s) Oral every 6 hours PRN Mild Pain (1 - 3)      RADIOLOGY/ADDITIONAL STUDIES:  < from: Xray Chest 1 View- PORTABLE-Urgent (23 @ 12:36) >    ACC: [id number] EXAM:  XR CHEST PORTABLE URGENT 1V   ORDERED BY: BENTON PABLO     PROCEDURE DATE:  2023          INTERPRETATION:  Chest pain.    AP chest. Prior 2023    IMPRESSION: Left ICD reidentified in position. Heart magnified by   projection. New vascular congestion and bilateral interstitial edema and   right pleural effusion. Correlate clinically for concomitant infection.    --- End of Report ---            RAJANI MARKS MD; Attending Radiologist  This document has been electronically signed. Dec  5 2023  1:36PM    < end of copied text >

## 2023-12-05 NOTE — CONSULT NOTE ADULT - NS ATTEND AMEND GEN_ALL_CORE FT
pt seen and examined  in NAD  pt w/ signficant heart disease and if open would qualify for hospice   dnr dni was reccomended but not ready at this time  agree w/ above note

## 2023-12-05 NOTE — PHYSICAL THERAPY INITIAL EVALUATION ADULT - PERTINENT HX OF CURRENT PROBLEM, REHAB EVAL
89 y/o M , p/w Dyspnea  As per Cardiology note "endstage CHF-- most recent EF 20-25 % presents with acute on chronic CHF"    PMH of HTN, CAD, v-tach, systolic CHF, AICD placement, hypothyroidism, gout, prostate cancer s/p seed therapy 88 y/o M , p/w Dyspnea  As per Cardiology note "endstage CHF-- most recent EF 20-25 % presents with acute on chronic CHF"    PMH of HTN, CAD, v-tach, systolic CHF, AICD placement, hypothyroidism, gout, prostate cancer s/p seed therapy

## 2023-12-05 NOTE — PHYSICAL THERAPY INITIAL EVALUATION ADULT - LIVES WITH, PROFILE
Pt lives in a home w/ 3 RUBIN and resides on the main floor but only has a half bath on main. Full bath on second floor./spouse

## 2023-12-05 NOTE — CONSULT NOTE ADULT - NS_MD_PANP_GEN_ALL_CORE
Patient was notified and further questions  Kassandra Wilson MA 6/22/2017       Attending and PA/NP shared services statement (NON-critical care):

## 2023-12-05 NOTE — CONSULT NOTE ADULT - CONVERSATION DETAILS
Met with Pt. and wife at the bedside alongside Lelia Smith LMSW to discuss GOC, assist with planning and provide supportive counseling.  Palliative role explained.  Emotional support provided.  Pt is alert and oriented, able to make needs known.  Pt. appears in good spirits, pleasant with our team.  Prior to hospitalization, Pt. resides at home with wife and son.  Pt is able to ambulate at home without an assistive device but has a cane, r/w or w/c in the event he requires.  Wife provides assistance with bathing and dressing.  Pt remains on the first floor of his home as he is unable to complete stairs at this time.      We discussed Pts current medical condition including Pts CHF and the chronic and progressive nature of his diease.  We discussed the option to change the focus to comfort to treat symptoms as they arise at home to prevent future hospitalizations with the support of hospice.  The philosophy of hospice reviewed in the event that Pt. needs in the future.  At this time Pt. desires future hospitalizations and medical work ups, he is not ready for a comfort focus.  He shared that what is most important to him is being with his family at this time.      We discussed Pts wishes regarding resuscitation and intubation.  Pt. shared that he is thinking more about putting limitations in place as he knows that his underlying medical condition is not reversible.  Wife and Pt to discuss his wishes further as he expressed to her earlier this week that he would be in agreement with a ventilator temporarily but would not want to live long term on the ventilator.  CPR vs DNR/DNI discussed in details.  We also noted that DNR does not mean do not treat.  We discussed that a DNR/DNI would be recommended given Pts underlying medical condition, which they understood.  No limits currently set.  Pt and wife to discuss his wifes together.  Pt remains a Full Code.    Our team to follow up with Pt and wife tomorrow to further discuss GOC.  No limits currently set.  Pt remains a Full Code.  Emotional support provided.  Family aware of palliative team availability.  Our team to continue to follow.

## 2023-12-05 NOTE — PHYSICAL THERAPY INITIAL EVALUATION ADULT - IMPAIRMENTS CONTRIBUTING TO GAIT DEVIATIONS, PT EVAL
Pt had 3 episodes of postural sway. Wife stated it has been happening  and that she needs to assist him a lot for safety./impaired balance/decreased strength

## 2023-12-05 NOTE — PROGRESS NOTE ADULT - ASSESSMENT
89 year old man with HTN, CAD, chronic HFrEF, hx of VT, s/p AICD, hypothyroidism, gout, prostate cancer, presented for further evaluation and management of progresively worsening exertional dysonea, B/L Leg swelling, weight gain concerning for CHF exacerbation. In the ED, troponin negative, EKG vpaced, proBNP 19,305. CXR with B/L interstitial edema and small R pleural effusion. Admitted to Medicine.     Acute on chronic systolic CHF  Signs and symptoms consistent with CHF exacerbation. BNP 19,000. Echo done. LV dilated and systolic function appears severely impaired; segmental wall motion abnormalities noted. Estimated LVEF 20-25%, similar to prior studies dated 12/17/2020 and 05/09/2023. LA mildly dilated. Mod MR and TR. Severe pulmonary hypertension. Pleural effusion present. IVC dilated with decreased respiratory variation.  Appreciate input from patient's Cardiology Dr. Almanzar who advises continued gentle diuresis, though noting that this has been limited by chronically low blood pressures.   - Continue gentle diuresis with Lasix  - Continue carvedilol and isosorbide  - Trend weight, monitor is and Os  - Trend Cr and lytes    CAD  No sign of acute coronary syndrome  - Continue aspirin, carvedilol, isosorbide  - Not on statin due to prior intolerance    Hx of VT, s/p AICD  Stable. No events.   - Continue amiodarone and carvedilol    Physical deconditioning, debility  Seen by PT. Recommended home with home PT  - Continue restorative PT sessions      DVT px: heparin sub cut  Code status: Palliative Care consulted, patient has expressed wishes to remain full code

## 2023-12-05 NOTE — PHYSICAL THERAPY INITIAL EVALUATION ADULT - MODALITIES TREATMENT COMMENTS
Pt returned to sitting in supine in bed, NAD, denies SOB, HR elevated from 70 to 84 during session on HM. +HM, CBIR, Wife present for whole session.

## 2023-12-05 NOTE — CONSULT NOTE ADULT - ASSESSMENT
89 M with endstage CHF-- most recent EF 20-25 % presents with acute on chronic CHF    CHF-trend renal function and BNP- continue gentle diuresis- limited by blood pressure - can use SBP hold parameter < 90 ,  continue coreg and nitrate as tolerated-- some of fluid overload is poor nutrition as albumin is low     CAD- no evidence for ACS - continue aspirin ( patient defers/ intolerant of statin)    VT- continue amio       will follow

## 2023-12-05 NOTE — GOALS OF CARE CONVERSATION - ADVANCED CARE PLANNING - CONVERSATION DETAILS
HPI:  88 y/o M w/ PMH of HTN, CAD, v-tach, systolic CHF, AICD placement, hypothyroidism, gout, prostate cancer s/p seed therapy p/w dyspnea the past few days. Patient compliant with lasix in discussed with Dr Almanzar with adjusting his lasix dose. Patient was Sent in today for weight gain,  dyspnea on exertion andedema stomacho down.  Patient not urinating much despite taking Lasix.     (04 Dec 2023 15:47)      PERTINENT PMH REVIEWED:  [ x ] YES [ ] NO           Primary Contact:        Sobeida, wife, surrogate, phone # [phone]    HCP [  ] Surrogate [ x  ] Guardian [   ]    Mental Status: [ x ] Alert  [ x ] Oriented [  ] Confused [  ] Lethargic  Concerns of Depression [  ] -none reported  Anxiety [   ] -none reported  Baseline ADLs (prior to admission):  Independent [ ] moderately [ ] fully   Dependent   [ x ] moderately [ ]fully    Family Meeting attendees: GOC held with Pt and wife.    Anticipated Grief: Patient[ x ] Family [ x ]    Caregiver Atkins Assessed: Yes [ x ] No [  ]    Yazidism: Islam.    Spiritual Concerns: Not identified,  available for support.    Goals of Care: Comfort [  ] Rehabilitation [  ] Curative [  ] Life Prolonging [ x ]    Previous Services: None.    ADVANCE DIRECTIVES:    -Pt has capacity  -Wife would be surrogate decision maker in the event Pt unable to make decisions  -Full Code    Anticipated D/C Plan: Return home with wife                     Summary:  Palliative NP, Jessy Daniel and this SW met with Pt. and wife at the bedside to discuss GOC, assist with planning and provide supportive counseling.  Palliative role explained.  Emotional support provided.  Pt is alert and oriented, able to make needs known.  Pt. appears in good spirits, pleasant with our team.  Prior to hospitalization, Pt. resides at home with wife and son.  Pt is able to ambulate at home without an assistive device but has a cane, r/w or w/c in the event he requires.  Wife provides assistance with bathing and dressing.  Pt remains on the first floor of his home as he is unable to complete stairs at this time.  Pt and wifes feelings explored.  Support provided.    We discussed Pts current medical condition including Pts current state of CHF and the chronic and progressive nature of his diease.  We discussed the option to change the focus to comfort to treat symptoms as they arise at home to prevent future hospitalizations with the support of hospice.  The philosophy of hospice reviewed in the event that Pt. needs in the future.  At this time Pt. desires future hospitalizations and medical work ups, he is not ready for a comfort focus.  He shared that what is most important to him is being with his family at this time.      We discussed Pts wishes regarding resuscitation and intubation.  Pt. shared that he is thinking more about putting limitations in place as he knows that his underlying medical condition is not reversible.  Wife and Pt to discuss his wishes further as he expressed to her earlier this week that he would be in agreement with a ventilator temporarily but would not want to live long term on the ventilator.  CPR vs DNR/DNI discussed in details.  We also noted that DNR does not mean do not treat.  We discussed that a DNR/DNI would be recommended given Pts underlying medical condition, which they understood.  No limits currently set.  Pt and wife to discuss his wifes together.  Pt remains a Full Code.    Our team to follow up with Pt and wife tomorrow to further discuss GOC.  No limits currently set.  Pt remains a Full Code.  Emotional support provided.  Family aware of palliative team availability.  Our team to continue to follow. HPI:  89 y/o M w/ PMH of HTN, CAD, v-tach, systolic CHF, AICD placement, hypothyroidism, gout, prostate cancer s/p seed therapy p/w dyspnea the past few days. Patient compliant with lasix in discussed with Dr Almanzar with adjusting his lasix dose. Patient was Sent in today for weight gain,  dyspnea on exertion andedema stomacho down.  Patient not urinating much despite taking Lasix.     (04 Dec 2023 15:47)      PERTINENT PMH REVIEWED:  [ x ] YES [ ] NO           Primary Contact:        Sobeida, wife, surrogate, phone # [phone]    HCP [  ] Surrogate [ x  ] Guardian [   ]    Mental Status: [ x ] Alert  [ x ] Oriented [  ] Confused [  ] Lethargic  Concerns of Depression [  ] -none reported  Anxiety [   ] -none reported  Baseline ADLs (prior to admission):  Independent [ ] moderately [ ] fully   Dependent   [ x ] moderately [ ]fully    Family Meeting attendees: GOC held with Pt and wife.    Anticipated Grief: Patient[ x ] Family [ x ]    Caregiver Willard Assessed: Yes [ x ] No [  ]    Mosque: Sabianism.    Spiritual Concerns: Not identified,  available for support.    Goals of Care: Comfort [  ] Rehabilitation [  ] Curative [  ] Life Prolonging [ x ]    Previous Services: None.    ADVANCE DIRECTIVES:    -Pt has capacity  -Wife would be surrogate decision maker in the event Pt unable to make decisions  -Full Code    Anticipated D/C Plan: Return home with wife                     Summary:  Palliative NP, Jessy Daniel and this SW met with Pt. and wife at the bedside to discuss GOC, assist with planning and provide supportive counseling.  Palliative role explained.  Emotional support provided.  Pt is alert and oriented, able to make needs known.  Pt. appears in good spirits, pleasant with our team.  Prior to hospitalization, Pt. resides at home with wife and son.  Pt is able to ambulate at home without an assistive device but has a cane, r/w or w/c in the event he requires.  Wife provides assistance with bathing and dressing.  Pt remains on the first floor of his home as he is unable to complete stairs at this time.  Pt and wifes feelings explored.  Support provided.    We discussed Pts current medical condition including Pts current state of CHF and the chronic and progressive nature of his diease.  We discussed the option to change the focus to comfort to treat symptoms as they arise at home to prevent future hospitalizations with the support of hospice.  The philosophy of hospice reviewed in the event that Pt. needs in the future.  At this time Pt. desires future hospitalizations and medical work ups, he is not ready for a comfort focus.  He shared that what is most important to him is being with his family at this time.      We discussed Pts wishes regarding resuscitation and intubation.  Pt. shared that he is thinking more about putting limitations in place as he knows that his underlying medical condition is not reversible.  Wife and Pt to discuss his wishes further as he expressed to her earlier this week that he would be in agreement with a ventilator temporarily but would not want to live long term on the ventilator.  CPR vs DNR/DNI discussed in details.  We also noted that DNR does not mean do not treat.  We discussed that a DNR/DNI would be recommended given Pts underlying medical condition, which they understood.  No limits currently set.  Pt and wife to discuss his wifes together.  Pt remains a Full Code.    Our team to follow up with Pt and wife tomorrow to further discuss GOC.  No limits currently set.  Pt remains a Full Code.  Emotional support provided.  Family aware of palliative team availability.  Our team to continue to follow.

## 2023-12-05 NOTE — PROGRESS NOTE ADULT - SUBJECTIVE AND OBJECTIVE BOX
Chief Complaint: Exertional dyspnea, swelling, weight gain    Interval Hx: Patient seen and examined this AM. Reports feeling improved since admission, having received diuresis with IV Lasix. Breathing more comfortably. Laying relatively flat today. No chest pain or tightness. No dizziness, lightheadedness or palpitations. No other complaints.     ROS: Multi system review is comprehensively x 10 systems except as above    Vitals:  T(F): 97.4 (05 Dec 2023 21:03), Max: 97.8 (04 Dec 2023 23:45)  HR: 60 (05 Dec 2023 21:03) (59 - 100)  BP: 104/59 (05 Dec 2023 21:03) (84/55 - 120/49)  RR: 18 (05 Dec 2023 21:03) (18 - 18)  SpO2: 98% (05 Dec 2023 21:03) (92% - 98%) on room air    Exam:   Gen: Comfortable appearing  HEENT: NCAT PERRL EOM MMM clear oropharynx  Neck Supple, no tenderness  CVS: s1 s2, regular, rate 60  Chest: normal resp effort  Abd: +BS, soft, NT ND  Ext: B/L LE edema, no focal tenderness  Skin: Warm, dry, no rash  Mood: Calm, pleasant  Neuro: Awake and alert, answers simple questions, follows simple commands    Labs:                              12.3   7.98  )-----------( 196      ( 05 Dec 2023 07:01 )             36.6     12-05    140  |  104  |  47  ---------------------<  106  3.7   |  29  |  1.94    Ca    9.0     Phos  2.9    Mg   2.5    TPro  6.4  /  Alb  2.8  /  TBili  2.0  /  DBili  x   /  AST  34  /  ALT  31  /  AlkPhos  157    Imaging:  CXR 12/4:  Left ICD reidentified in position. Heart magnified by projection. New vascular congestion and bilateral interstitial edema and right pleural effusion.     Cardiac Testing:  TTE 12/5:  Left ventricle is dilated and systolic function appears severely impaired; segmental wall motion abnormalities noted. Estimated Ejection Fraction is 20-25%. 3D heart model shows EF 20-25%. Findings similar to prior studies dated 12/17/2020 and 05/09/2023. The left atrium is mildly dilated. A device wire is seen in the RV and RA. Mild aortic sclerosis is present with minimally restricted valvular opening. There is calcification of both mitral valve leaflets. The leaflet opening appears normal. Moderate mitral regurgitation is present. Moderate Tricuspid regurgitation is present. Severe pulmonary hypertension. Normal appearing pulmonic valve structure and function. Mild pulmonic valvular regurgitation (1+) is present. Pleural effusion - is present. The IVC is dilated with decreased respiratory variation.    EKG 12/4: Vpaced, rate 62    Meds:  MEDICATIONS  (STANDING):  allopurinol 100 milliGRAM(s) Oral two times a day  aMIOdarone    Tablet 200 milliGRAM(s) Oral daily  aspirin enteric coated 81 milliGRAM(s) Oral daily  carvedilol 9.375 milliGRAM(s) Oral every 12 hours  cholecalciferol 1000 Unit(s) Oral daily  cyanocobalamin 1000 MICROGram(s) Oral daily  furosemide   Injectable 20 milliGRAM(s) IV Push every 8 hours  influenza  Vaccine (HIGH DOSE) 0.7 milliLiter(s) IntraMuscular once  isosorbide   dinitrate Tablet (ISORDIL) 5 milliGRAM(s) Oral two times a day  levothyroxine 187.5 MICROGram(s) Oral <User Schedule>  levothyroxine 125 MICROGram(s) Oral <User Schedule>    MEDICATIONS  (PRN):  acetaminophen     Tablet .. 650 milliGRAM(s) Oral every 6 hours PRN Mild Pain (1 - 3)   Chief Complaint: Exertional dyspnea, swelling, weight gain    Interval Hx: Patient seen and examined this AM. Reports feeling improved since admission, having received diuresis with IV Lasix. Breathing more comfortably. Laying relatively flat today. No chest pain or tightness. No dizziness, lightheadedness or palpitations. No other complaints.     ROS: Multi system review is comprehensively x 10 systems except as above    Vitals:  T(F): 97.4 (05 Dec 2023 21:03), Max: 97.8 (04 Dec 2023 23:45)  HR: 60 (05 Dec 2023 21:03) (59 - 100)  BP: 104/59 (05 Dec 2023 21:03) (84/55 - 120/49)  RR: 18 (05 Dec 2023 21:03) (18 - 18)  SpO2: 98% (05 Dec 2023 21:03) (92% - 98%) on room air    Exam:   Gen: Comfortable appearing  HEENT: NCAT PERRL EOM MMM clear oropharynx  Neck Supple, no tenderness  CVS: s1 s2, regular, rate 60  Chest: normal resp effort  Abd: +BS, soft, NT ND  Ext: B/L LE edema, no focal tenderness  Skin: Warm, dry, no rash  Mood: Calm, pleasant  Neuro: Awake and alert, answers simple questions, follows simple commands    Labs:               12.3   7.98  )-----------( 196             36.6       140  |  104  |  47  ---------------------<  106  3.7   |  29  |  1.94    Ca    9.0     Phos  2.9    Mg   2.5    TPro  6.4  /  Alb  2.8  /  TBili  2.0  /  DBili  x   /  AST  34  /  ALT  31  /  AlkPhos  157    Troponin negative    proBNP 19,305    Imaging:  CXR 12/4:  Left ICD reidentified in position. Heart magnified by projection. New vascular congestion and bilateral interstitial edema and right pleural effusion.     Cardiac Testing:  TTE 12/5:  Left ventricle is dilated and systolic function appears severely impaired; segmental wall motion abnormalities noted. Estimated Ejection Fraction is 20-25%. 3D heart model shows EF 20-25%. Findings similar to prior studies dated 12/17/2020 and 05/09/2023. The left atrium is mildly dilated. A device wire is seen in the RV and RA. Mild aortic sclerosis is present with minimally restricted valvular opening. There is calcification of both mitral valve leaflets. The leaflet opening appears normal. Moderate mitral regurgitation is present. Moderate Tricuspid regurgitation is present. Severe pulmonary hypertension. Normal appearing pulmonic valve structure and function. Mild pulmonic valvular regurgitation (1+) is present. Pleural effusion - is present. The IVC is dilated with decreased respiratory variation.    EKG 12/4: Vpaced, rate 62    Meds:  MEDICATIONS  (STANDING):  allopurinol 100 milliGRAM(s) Oral two times a day  aMIOdarone    Tablet 200 milliGRAM(s) Oral daily  aspirin enteric coated 81 milliGRAM(s) Oral daily  carvedilol 9.375 milliGRAM(s) Oral every 12 hours  cholecalciferol 1000 Unit(s) Oral daily  cyanocobalamin 1000 MICROGram(s) Oral daily  furosemide   Injectable 20 milliGRAM(s) IV Push every 8 hours  influenza  Vaccine (HIGH DOSE) 0.7 milliLiter(s) IntraMuscular once  isosorbide   dinitrate Tablet (ISORDIL) 5 milliGRAM(s) Oral two times a day  levothyroxine 187.5 MICROGram(s) Oral <User Schedule>  levothyroxine 125 MICROGram(s) Oral <User Schedule>    MEDICATIONS  (PRN):  acetaminophen     Tablet .. 650 milliGRAM(s) Oral every 6 hours PRN Mild Pain (1 - 3)

## 2023-12-05 NOTE — CONSULT NOTE ADULT - SUBJECTIVE AND OBJECTIVE BOX
CHIEF COMPLAINT: Patient is a 89y old  Male who presents with a chief complaint of Dyspnea (04 Dec 2023 15:47)      HPI:  89 y/o M w/ PMH of HTN, CAD, v-tach, systolic CHF, AICD placement, hypothyroidism, gout, prostate cancer s/p seed therapy p/w dyspnea the past few days. Patient compliant with lasix in discussed with Dr Almanzar with adjusting his lasix dose. Patient was Sent in today for weight gain,  dyspnea on exertion andedema stomacho down.  Patient not urinating much despite taking Lasix.  No chest pain, palpitations, syncope, cough, fever or  sick contacts at home.    In the ER, Patient recevied IV lasix.         feeling better since IV lasix       (04 Dec 2023 15:47)      PMHx: PAST MEDICAL & SURGICAL HISTORY:  Hypertension      H/O ventricular tachycardia  PPM placed 2008      Myocardial infarction  54 years old      Prostate cancer      Implantable cardioverter-defibrillator (ICD) in situ  2008      History of tonsillectomy  as child      Elective surgery  right middle finger surgery, no hardware      H/O pilonidal cyst      H/O prostate biopsy  cancer treated with prostate seeding            Soc Hx:        Allergies: Allergies    mexiletine (Unknown)  statins (Rash)  dexamethasone (Hives)    Intolerances        Vital Signs Last 24 Hrs  T(C): 36.3 (05 Dec 2023 05:16), Max: 36.6 (04 Dec 2023 19:00)  T(F): 97.4 (05 Dec 2023 05:16), Max: 97.8 (04 Dec 2023 19:00)  HR: 59 (05 Dec 2023 05:16) (59 - 100)  BP: 103/65 (05 Dec 2023 05:16) (85/61 - 114/82)  BP(mean): 75 (04 Dec 2023 17:37) (75 - 87)  RR: 18 (05 Dec 2023 05:16) (18 - 18)  SpO2: 97% (05 Dec 2023 05:16) (93% - 99%)    Parameters below as of 05 Dec 2023 05:16  Patient On (Oxygen Delivery Method): room air        I&O's Summary    04 Dec 2023 07:01  -  05 Dec 2023 07:00  --------------------------------------------------------  IN: 0 mL / OUT: 350 mL / NET: -350 mL            PHYSICAL EXAM:   Constitutional: NAD, awake and alert, well-developed  Respiratory: basilar crackles   Cardiovascular: S1 and S2, iregular rate and rhythm, KATLYN    Extremities: 2-3+ peripheral edema  Vascular: 2+ peripheral pulses  Neurological: A/O x 3, no focal deficits  Musculoskeletal: 5/5 strength b/l upper and lower extremities      MEDICATIONS:  MEDICATIONS  (STANDING):  allopurinol 100 milliGRAM(s) Oral two times a day  aMIOdarone    Tablet 200 milliGRAM(s) Oral daily  aspirin enteric coated 81 milliGRAM(s) Oral daily  carvedilol 9.375 milliGRAM(s) Oral every 12 hours  cholecalciferol 1000 Unit(s) Oral daily  cyanocobalamin 1000 MICROGram(s) Oral daily  furosemide   Injectable 20 milliGRAM(s) IV Push every 8 hours  influenza  Vaccine (HIGH DOSE) 0.7 milliLiter(s) IntraMuscular once  isosorbide   dinitrate Tablet (ISORDIL) 5 milliGRAM(s) Oral two times a day  levothyroxine 125 MICROGram(s) Oral <User Schedule>  levothyroxine 187.5 MICROGram(s) Oral <User Schedule>      LABS: All Labs Reviewed:                        12.5 8.74  )-----------( 207      ( 04 Dec 2023 11:15 )             38.0     12-04    136  |  101  |  49<H>  ----------------------------<  104<H>  4.1   |  30  |  2.06<H>    Ca    8.8      04 Dec 2023 11:15    TPro  6.4  /  Alb  2.8<L>  /  TBili  2.0<H>  /  DBili  x   /  AST  34  /  ALT  31  /  AlkPhos  157<H>  12-04          EKG:< from: 12 Lead ECG (12.04.23 @ 11:03) >    Diagnosis Line Ventricular-paced rhythm  Abnormal ECG  When compared with ECG of 09-MAY-2023 07:28,  Premature ventricular complexes are no longer Present  Vent. rate has decreased BY   4 BPM  Confirmed by Palla MD, Lucian ([id number]) on 12/4/2023 9:57:05 PM    < end of copied text >      Telemetry: V paced     ECHO:< from: TTE Echo Complete w/o Contrast w/ Doppler (05.09.23 @ 08:12) >  gs     Mitral Valve   There is calcification ofboth mitral valve leaflets. The leaflet opening   is normal.   Mild to Moderate mitral regurgitation is present.     Aortic Valve   Mild aortic sclerosis is present with normal valvular opening.     Tricuspid Valve   Mild to Moderate Tricuspid regurgitation is present. Severe pulmonary   hypertension.     Pulmonic Valve   Normal appearing pulmonic valve structure and function.     Left Atrium   The left atrium is mildly dilated.     Left Ventricle   Left ventricle is dilated and systolic functionappears severely   impaired;   segmental wall motion abnormalities noted. Estimated Ejection Fraction is   20-25%.   Findings similar to prior study dated 12/17/2020     Right Atrium   Normal appearing right atrium.     Right Ventricle   A device wire is seen in the RV and RA. RV size and contractility appear   normal.     Pericardial Effusion   No evidence of pericardial effusion.     Pleural Effusion   No evidence of pleural effusion.     Miscellaneous   The IVC appears normal.     Impression     Summary     Left ventricle is dilated and systolic function appears severely   impaired;   segmental wall motion abnormalities noted. Estimated Ejection Fraction is   20-25%.   Findings similar to prior study dated 12/17/2020   The left atrium is mildly dilated.   A device wire is seen in the RV and RA. RV size and contractility appear   normal.   There is calcification of both mitral valve leaflets. The leaflet opening   is normal.   Mild to Moderate mitral regurgitation is present.   Mild to Moderate Tricuspid regurgitation is present. Severe pulmonary   hypertension.   No evidence of pericardial effusion.     Signature     ----------------------------------------------------------------   Electronically signed by Aaron Olivera MD(Interpreting physician)   on 05/09/2023 03:24 PM   ----------------------------------------------------------------    < end of copied text >         CHIEF COMPLAINT: Patient is a 89y old  Male who presents with a chief complaint of Dyspnea (04 Dec 2023 15:47)      HPI:  88 y/o M w/ PMH of HTN, CAD, v-tach, systolic CHF, AICD placement, hypothyroidism, gout, prostate cancer s/p seed therapy p/w dyspnea the past few days. Patient compliant with lasix in discussed with Dr Almanzar with adjusting his lasix dose. Patient was Sent in today for weight gain,  dyspnea on exertion andedema stomacho down.  Patient not urinating much despite taking Lasix.  No chest pain, palpitations, syncope, cough, fever or  sick contacts at home.    In the ER, Patient recevied IV lasix.         feeling better since IV lasix       (04 Dec 2023 15:47)      PMHx: PAST MEDICAL & SURGICAL HISTORY:  Hypertension      H/O ventricular tachycardia  PPM placed 2008      Myocardial infarction  54 years old      Prostate cancer      Implantable cardioverter-defibrillator (ICD) in situ  2008      History of tonsillectomy  as child      Elective surgery  right middle finger surgery, no hardware      H/O pilonidal cyst      H/O prostate biopsy  cancer treated with prostate seeding            Soc Hx:        Allergies: Allergies    mexiletine (Unknown)  statins (Rash)  dexamethasone (Hives)    Intolerances        Vital Signs Last 24 Hrs  T(C): 36.3 (05 Dec 2023 05:16), Max: 36.6 (04 Dec 2023 19:00)  T(F): 97.4 (05 Dec 2023 05:16), Max: 97.8 (04 Dec 2023 19:00)  HR: 59 (05 Dec 2023 05:16) (59 - 100)  BP: 103/65 (05 Dec 2023 05:16) (85/61 - 114/82)  BP(mean): 75 (04 Dec 2023 17:37) (75 - 87)  RR: 18 (05 Dec 2023 05:16) (18 - 18)  SpO2: 97% (05 Dec 2023 05:16) (93% - 99%)    Parameters below as of 05 Dec 2023 05:16  Patient On (Oxygen Delivery Method): room air        I&O's Summary    04 Dec 2023 07:01  -  05 Dec 2023 07:00  --------------------------------------------------------  IN: 0 mL / OUT: 350 mL / NET: -350 mL            PHYSICAL EXAM:   Constitutional: NAD, awake and alert, well-developed  Respiratory: basilar crackles   Cardiovascular: S1 and S2, iregular rate and rhythm, KATLYN    Extremities: 2-3+ peripheral edema  Vascular: 2+ peripheral pulses  Neurological: A/O x 3, no focal deficits  Musculoskeletal: 5/5 strength b/l upper and lower extremities      MEDICATIONS:  MEDICATIONS  (STANDING):  allopurinol 100 milliGRAM(s) Oral two times a day  aMIOdarone    Tablet 200 milliGRAM(s) Oral daily  aspirin enteric coated 81 milliGRAM(s) Oral daily  carvedilol 9.375 milliGRAM(s) Oral every 12 hours  cholecalciferol 1000 Unit(s) Oral daily  cyanocobalamin 1000 MICROGram(s) Oral daily  furosemide   Injectable 20 milliGRAM(s) IV Push every 8 hours  influenza  Vaccine (HIGH DOSE) 0.7 milliLiter(s) IntraMuscular once  isosorbide   dinitrate Tablet (ISORDIL) 5 milliGRAM(s) Oral two times a day  levothyroxine 125 MICROGram(s) Oral <User Schedule>  levothyroxine 187.5 MICROGram(s) Oral <User Schedule>      LABS: All Labs Reviewed:                        12.5 8.74  )-----------( 207      ( 04 Dec 2023 11:15 )             38.0     12-04    136  |  101  |  49<H>  ----------------------------<  104<H>  4.1   |  30  |  2.06<H>    Ca    8.8      04 Dec 2023 11:15    TPro  6.4  /  Alb  2.8<L>  /  TBili  2.0<H>  /  DBili  x   /  AST  34  /  ALT  31  /  AlkPhos  157<H>  12-04          EKG:< from: 12 Lead ECG (12.04.23 @ 11:03) >    Diagnosis Line Ventricular-paced rhythm  Abnormal ECG  When compared with ECG of 09-MAY-2023 07:28,  Premature ventricular complexes are no longer Present  Vent. rate has decreased BY   4 BPM  Confirmed by Palla MD, Lucian ([id number]) on 12/4/2023 9:57:05 PM    < end of copied text >      Telemetry: V paced     ECHO:< from: TTE Echo Complete w/o Contrast w/ Doppler (05.09.23 @ 08:12) >  gs     Mitral Valve   There is calcification ofboth mitral valve leaflets. The leaflet opening   is normal.   Mild to Moderate mitral regurgitation is present.     Aortic Valve   Mild aortic sclerosis is present with normal valvular opening.     Tricuspid Valve   Mild to Moderate Tricuspid regurgitation is present. Severe pulmonary   hypertension.     Pulmonic Valve   Normal appearing pulmonic valve structure and function.     Left Atrium   The left atrium is mildly dilated.     Left Ventricle   Left ventricle is dilated and systolic functionappears severely   impaired;   segmental wall motion abnormalities noted. Estimated Ejection Fraction is   20-25%.   Findings similar to prior study dated 12/17/2020     Right Atrium   Normal appearing right atrium.     Right Ventricle   A device wire is seen in the RV and RA. RV size and contractility appear   normal.     Pericardial Effusion   No evidence of pericardial effusion.     Pleural Effusion   No evidence of pleural effusion.     Miscellaneous   The IVC appears normal.     Impression     Summary     Left ventricle is dilated and systolic function appears severely   impaired;   segmental wall motion abnormalities noted. Estimated Ejection Fraction is   20-25%.   Findings similar to prior study dated 12/17/2020   The left atrium is mildly dilated.   A device wire is seen in the RV and RA. RV size and contractility appear   normal.   There is calcification of both mitral valve leaflets. The leaflet opening   is normal.   Mild to Moderate mitral regurgitation is present.   Mild to Moderate Tricuspid regurgitation is present. Severe pulmonary   hypertension.   No evidence of pericardial effusion.     Signature     ----------------------------------------------------------------   Electronically signed by Aaron Olivera MD(Interpreting physician)   on 05/09/2023 03:24 PM   ----------------------------------------------------------------    < end of copied text >

## 2023-12-05 NOTE — CONSULT NOTE ADULT - ASSESSMENT
Pt is a 89y old Male with hx of HTN, CAD, v-tach, systolic CHF, AICD placement, hypothyroidism, gout, prostate cancer s/p seed therapy p/w dyspnea the past few days. Patient compliant with lasix in discussed with Dr Almanzar with adjusting his lasix dose. Patient was Sent in today for weight gain,  dyspnea on exertion andedema stomacho down.  Patient not urinating much despite taking Lasix.  No chest pain, palpitations, syncope, cough, fever or  sick contacts at home.    1. Endstage CHF  -recent echo with EF 20-25%  -barriers with GDMT as patient's BP is low  -diuresis with IV lasix as patient BP tolerates  -c/w Coreg, Isordil  -of note, has AICD  -discussed option of hospice which patient is not ready for at this time    Process of Care   --Reviewed dx/treatment problems and alignment with Goals of Care     Physical Aspects of Care   --Pain   patient denies at this time   c/w current management     --Bowel Regimen   denies constipation   risk for constipation d/t immobility   reports occasional loose stools  daily dulcolax as needed     --Dyspnea   No SOB at this time, on room air  comfortable and in NAD     --Nausea Vomiting   denies     --Weakness   PT as tolerated    OOB as tolerated        Psychological and Psychiatric Aspects of Care:    --Grief/ Bereavement: emotional support provided   --Hx of psychiatric dx: none   --Pastoral Care Available PRN         Social Aspects of Care   --SW involved          Cultural Aspects   --Primary Language: English           Goals of Care: See detailed GOC section above. Discussed option of transitioning to hospice, patient not ready for at this time. Discussed MOLST with recommendations for DNR/DNI given patient's advanced end stage HF. NO limits set at this time, palliative to follow up.          We discussed Palliative Care team being a supportive team when a patient has ongoing illnesses.  We also discussed that it is not an end of life care service, but can help navigate symptoms and emotional support throughout their hospital stay here.           Ethical and Legal Aspects: NA           Capacity- A&Ox4, has medical decision making capacity       HCP/Surrogate:  Sobeida Tamayo, spouse [phone]      Code Status: Full code    MOLST:     Dispo Plan: home with home care vs. [facility]          Discussed With: Dr. Askew coordinated with attending and SW and RN            Time Spent: 90 minutes including the care, coordination and counseling of this patient, 50% of which was spent coordinating and counseling.   Pt is a 89y old Male with hx of HTN, CAD, v-tach, systolic CHF, AICD placement, hypothyroidism, gout, prostate cancer s/p seed therapy p/w dyspnea the past few days. Patient compliant with lasix in discussed with Dr Almanzar with adjusting his lasix dose. Patient was Sent in today for weight gain,  dyspnea on exertion andedema stomacho down.  Patient not urinating much despite taking Lasix.  No chest pain, palpitations, syncope, cough, fever or  sick contacts at home.    1. Endstage CHF  -recent echo with EF 20-25%  -barriers with GDMT as patient's BP is low  -diuresis with IV lasix as patient BP tolerates  -c/w Coreg, Isordil  -of note, has AICD  -discussed option of hospice which patient is not ready for at this time    Process of Care   --Reviewed dx/treatment problems and alignment with Goals of Care     Physical Aspects of Care   --Pain   patient denies at this time   c/w current management     --Bowel Regimen   denies constipation   risk for constipation d/t immobility   reports occasional loose stools  daily dulcolax as needed     --Dyspnea   No SOB at this time, on room air  comfortable and in NAD     --Nausea Vomiting   denies     --Weakness   PT as tolerated    OOB as tolerated        Psychological and Psychiatric Aspects of Care:    --Grief/ Bereavement: emotional support provided   --Hx of psychiatric dx: none   --Pastoral Care Available PRN         Social Aspects of Care   --SW involved          Cultural Aspects   --Primary Language: English           Goals of Care: See detailed GOC section above. Discussed option of transitioning to hospice, patient not ready for at this time. Discussed MOLST with recommendations for DNR/DNI given patient's advanced end stage HF. NO limits set at this time, palliative to follow up.          We discussed Palliative Care team being a supportive team when a patient has ongoing illnesses.  We also discussed that it is not an end of life care service, but can help navigate symptoms and emotional support throughout their hospital stay here.           Ethical and Legal Aspects: NA           Capacity- A&Ox4, has medical decision making capacity       HCP/Surrogate:  Sobeida Tamayo, spouse [phone]      Code Status: Full code    MOLST:     Dispo Plan: home with home care vs. [facility]          Discussed With: Dr. Askew coordinated with attending and SW and RN            Time Spent: 90 minutes including the care, coordination and counseling of this patient, 50% of which was spent coordinating and counseling.

## 2023-12-05 NOTE — PHYSICAL THERAPY INITIAL EVALUATION ADULT - GENERAL OBSERVATIONS, REHAB EVAL
Pt seen on 3E, Pall care just left pt, RN reported pt stable for session. +HM, wife at bedside, noted L anterior chest PPM, B LE edema+

## 2023-12-05 NOTE — PHYSICAL THERAPY INITIAL EVALUATION ADULT - ADDITIONAL COMMENTS
Pt reports having a SC and RW at home but has been reluctant to use. Pt states his wife assists him. However, he has been falling and wife has fallen with him. Pt stated he can into hospital due to edema B LE's and in his stomach and gain a lot of weight due to the swelling.

## 2023-12-06 NOTE — DISCHARGE NOTE PROVIDER - PROVIDER TOKENS
PROVIDER:[TOKEN:[[id number]],FOLLOWUP:[1-3 days]],PROVIDER:[TOKEN:[[id number]],SCHEDULEDAPPT:[12/12/2023]]

## 2023-12-06 NOTE — DISCHARGE NOTE PROVIDER - NSDCFUSCHEDAPPT_GEN_ALL_CORE_FT
[facility] Physician [address]  Scheduled Appointment: 12/12/2023     [facility] Physician [address]  Scheduled Appointment: 12/12/2023

## 2023-12-06 NOTE — DISCHARGE NOTE PROVIDER - NSDCCPCAREPLAN_GEN_ALL_CORE_FT
PRINCIPAL DISCHARGE DIAGNOSIS  Diagnosis: Acute on chronic systolic congestive heart failure  Assessment and Plan of Treatment: You were admitted to the hospital for decompensated congestive  heart failure. you were seen and evalauted by Cardiology Dr almanzar. You were treated with careful diuresis with intravenous furosemide, though noting that this has been limited by chronically low blood pressures. Low dose midodrine was added to help support your blood pressure. You were also continued on carvedilol and isosorbide. You are now overall improving but would benefit from continued hospitalization for continued cardiac care and medication optimization. Unfortunately, due to issues with you having severe anxiety and even disorientation at night, your family is having you return home. Discussed with Dr. Almanzar. Continue regimen of carvedilol, isosorbide, oral furosemide, addition of low dose midodrine. Resume home dapagliflozin. Monitor weight, at least 3 days per week or more. If greater than 3 lbs of weight gain over a few days, Dr Almanzar should be notified. Regardless, would advise youget in touch with Dr. Almanzar within 24-48 hrs of discharge. Dr Almanzar aware.      SECONDARY DISCHARGE DIAGNOSES  Diagnosis: CAD (coronary artery disease)  Assessment and Plan of Treatment: No sign of acute coronary syndrome. Continue aspirin, carvedilol, isosorbide. Not on statin due to prior intolerance.    Diagnosis: History of ventricular tachycardia  Assessment and Plan of Treatment: Hx of v-tach s/p AICD (defibrillator). No events on cardiac monitor. Continue amiodarone and carvedilol    Diagnosis: Physical deconditioning  Assessment and Plan of Treatment: Physical deconditioning, debility noted. Seen by Physical Therapy (PT) who recommended home PT. Referral placed.

## 2023-12-06 NOTE — DISCHARGE NOTE PROVIDER - CARE PROVIDERS DIRECT ADDRESSES
,DirectAddress_Unknown,[email or web address] ,DirectAddress_Unknown,[email or web address]

## 2023-12-06 NOTE — DISCHARGE NOTE PROVIDER - NSDCFUADDINST_GEN_ALL_CORE_FT
Avoid added salt in diet. Limit fluid intake to no more than 1.5 liter per day. Monitor weight. Preferably 3 or more times per week. Notify Dr Almanzar if more than 3 pounds of weight gain over 3-4 days.

## 2023-12-06 NOTE — DISCHARGE NOTE PROVIDER - NSDCMRMEDTOKEN_GEN_ALL_CORE_FT
allopurinol 100 mg oral tablet: 1 tab(s) orally 2 times a day  amiodarone 200 mg oral tablet: 1 tab(s) orally once a day  Aspirin Enteric Coated 81 mg oral delayed release tablet: 1 tab(s) orally once a day   carvedilol 6.25 mg oral tablet: 1.5 tab(s) orally 2 times a day  cholecalciferol 25 mcg (1000 intl units) oral tablet: 1 tab(s) orally once a day  cyanocobalamin 1000 mcg oral tablet: 1 tab(s) orally 2 times a day  dapagliflozin 5 mg oral tablet: 1 tablet orally every other day  Fish Oil 1200 mg oral capsule: 1 cap(s) orally 3 times a day  furosemide 40 mg oral tablet: 1 tab(s) orally once a day  isosorbide dinitrate 5 mg oral tablet: 1 tab(s) orally 2 times a day  levothyroxine 125 mcg (0.125 mg) oral tablet: 1 tab(s) orally 6 times a week ***Mon-Sat***  levothyroxine 125 mcg (0.125 mg) oral tablet: 1.5 tab(s) orally once a week on Sunday  midodrine 2.5 mg oral tablet: 1 tab(s) orally 3 times a day  PreserVision AREDS 2 oral capsule: 2 cap(s) orally once a day

## 2023-12-06 NOTE — PROGRESS NOTE ADULT - ASSESSMENT
89 M with endstage CHF-- most recent EF 20-25 % presents with acute on chronic CHF    CHF-continue to trend renal function and BNP-     continue gentle diuresis- limited by blood pressure - can use SBP hold parameter < 90 ,  will add low dose of midodrine-  continue coreg and nitrate as tolerated-- some of fluid overload is poor nutrition as albumin is low     CAD- no evidence for ACS - continue aspirin ( patient defers/ intolerant of statin)    VT- continue amio       will follow

## 2023-12-06 NOTE — PROGRESS NOTE ADULT - NS ATTEND AMEND GEN_ALL_CORE FT
agree w/ above note   pt currently in NAD   hoping to go home  goc and advanced directives discussed but no changes made at this time

## 2023-12-06 NOTE — DISCHARGE NOTE PROVIDER - HOSPITAL COURSE
89 year old man with HTN, CAD, chronic HFrEF, hx of VT, s/p AICD, hypothyroidism, gout, prostate cancer, presented for further evaluation and management of progresively worsening exertional dyspnea, B/L Leg swelling, weight gain concerning for CHF exacerbation. In the ED, troponin negative, EKG vpaced, proBNP 19,305. CXR with B/L interstitial edema and small R pleural effusion. Admitted to Medicine.     Acute on chronic systolic CHF  Signs and symptoms consistent with CHF exacerbation. BNP 19,000. Echo done. LV dilated and systolic function appears severely impaired; segmental wall motion abnormalities noted. Estimated LVEF 20-25%, similar to prior studies dated 12/17/2020 and 05/09/2023. LA mildly dilated. Mod MR and TR. Severe pulmonary hypertension. Pleural effusion present. IVC dilated with decreased respiratory variation. Appreciate input from patient's Cardiology Dr. Almanzar who advised continued gentle diuresis, though noting that this has been limited by chronically low blood pressures. Low dose midodrine was added and patient was continued on IV Lasix. Continued on carvedilol and isosorbide. Patient is now breathing comfortably, feeling well, eager to return home. Family is adamant that patient return home because last night he was very anxious and difficult to re-direct. Cardiology recommended patient remain admitted for continued cardiac care and optimization but family preferred for patient to return home and follow up directly with Cardiology Dr. Almanzar. Advised patient's family to continue regimen of carvedilol, isordil, oral furosemide, addition of low dose midodrine. Resume home dapagliflozin. Monitor weight, at least 3 days per week or more, If greater than 3 lbs of weight gain over a few days, Dr Almanzar should be notified. Regardless, would have family get in touch with Dr. Almanzar within 24-48 hrs of discharge. Dr Almanzar aware.     CAD  No sign of acute coronary syndrome. Continue aspirin, carvedilol, isosorbide. Not on statin due to prior intolerance    Hx of VT, s/p AICD  Stable. No events. Continue amiodarone and carvedilol    Physical deconditioning, debility  Seen by PT. Recommended home with home PT. Continue restorative PT sessions      Discharge Exam:   Afebrile  100/50  HR 60  RR 16  O2 98% on RA  Gen: Comfortable appearing  HEENT: NCAT PERRL EOM MMM clear oropharynx  Neck Supple, no tenderness  CVS: s1 s2, regular, rate 60  Chest: normal resp effort  Abd: +BS, soft, NT ND  Ext: Improving B/L LE edema, no focal tenderness  Skin: Warm, dry, no rash  Mood: Calm, pleasant  Neuro: Awake and alert, answers simple questions, follows simple commands    Labs:               12.3   7.98  )-----------( 196             36.6       140  |  102  |  47  ---------------------<  109  3.8   |   30  |  1.99    Ca 8.7    Phos  2.9    Mg   2.5    TPro  6.4  /  Alb  2.8  /  TBili  2.0  /  DBili  x   /  AST  34  /  ALT  31  /  AlkPhos  157    Troponin negative    proBNP 19,305    Imaging:  CXR 12/4:  Left ICD reidentified in position. Heart magnified by projection. New vascular congestion and bilateral interstitial edema and right pleural effusion.     Cardiac Testing:  TTE 12/5:  Left ventricle is dilated and systolic function appears severely impaired; segmental wall motion abnormalities noted. Estimated Ejection Fraction is 20-25%. 3D heart model shows EF 20-25%. Findings similar to prior studies dated 12/17/2020 and 05/09/2023. The left atrium is mildly dilated. A device wire is seen in the RV and RA. Mild aortic sclerosis is present with minimally restricted valvular opening. There is calcification of both mitral valve leaflets. The leaflet opening appears normal. Moderate mitral regurgitation is present. Moderate Tricuspid regurgitation is present. Severe pulmonary hypertension. Normal appearing pulmonic valve structure and function. Mild pulmonic valvular regurgitation (1+) is present. Pleural effusion - is present. The IVC is dilated with decreased respiratory variation.    EKG 12/4: Vpaced, rate 62

## 2023-12-06 NOTE — DISCHARGE NOTE NURSING/CASE MANAGEMENT/SOCIAL WORK - NSDCPEFALRISK_GEN_ALL_CORE
For information on Fall & Injury Prevention, visit: [email or web address] OR  [email or web address] OR  https://www.cdc.gov/steadi/patient.html For information on Fall & Injury Prevention, visit: [email or web address] OR  [email or web address] OR  https://www.cdc.gov/steadi/patient.html

## 2023-12-06 NOTE — PROGRESS NOTE ADULT - ASSESSMENT
Please authorize medications Pt is a 89y old Male with hx of HTN, CAD, v-tach, systolic CHF, AICD placement, hypothyroidism, gout, prostate cancer s/p seed therapy p/w dyspnea the past few days. Patient compliant with lasix in discussed with Dr Almanzar with adjusting his lasix dose. Patient was Sent in today for weight gain,  dyspnea on exertion andedema stomacho down.  Patient not urinating much despite taking Lasix.  No chest pain, palpitations, syncope, cough, fever or  sick contacts at home.    1. Endstage CHF  -recent echo with EF 20-25%  -barriers with GDMT as patient's BP is low  -diuresis with IV lasix as patient BP tolerates  -started on midodrine  -c/w Coreg, Isordil  -of note, has AICD  -discussed option of hospice which patient is not ready for at this time    Process of Care   --Reviewed dx/treatment problems and alignment with Goals of Care     Physical Aspects of Care   --Pain   patient denies at this time   c/w current management     --Bowel Regimen   denies constipation   risk for constipation d/t immobility   reports occasional loose stools  daily dulcolax as needed     --Dyspnea   No SOB at this time, on room air  comfortable and in NAD     --Nausea Vomiting   denies     --Weakness   PT as tolerated    OOB as tolerated        Psychological and Psychiatric Aspects of Care:    --Grief/ Bereavement: emotional support provided   --Hx of psychiatric dx: none   --Pastoral Care Available PRN         Social Aspects of Care   --SW involved          Cultural Aspects   --Primary Language: English           Goals of Care: Discussed patient's advanced, end-stage HF with option of transitioning to hospice, patient not ready for at this time. Discussed MOLST with recommendations for DNR/DNI given patient's advanced end stage HF. NO limits set at this time, palliative to follow up.          We discussed Palliative Care team being a supportive team when a patient has ongoing illnesses.  We also discussed that it is not an end of life care service, but can help navigate symptoms and emotional support throughout their hospital stay here.           Ethical and Legal Aspects: NA           Capacity- A&Ox4, has medical decision making capacity       HCP/Surrogate:  Sobeida Tamayo, spouse [phone]      Code Status: Full code    MOLST:     Dispo Plan: home with home care vs. [facility]          Discussed With: Dr. Askew coordinated with attending and SW and RN            Time Spent: 75 minutes including the care, coordination and counseling of this patient, 50% of which was spent coordinating and counseling.    Pt is a 89y old Male with hx of HTN, CAD, v-tach, systolic CHF, AICD placement, hypothyroidism, gout, prostate cancer s/p seed therapy p/w dyspnea the past few days. Patient compliant with lasix in discussed with Dr Almanzar with adjusting his lasix dose. Patient was Sent in today for weight gain,  dyspnea on exertion andedema stomacho down.  Patient not urinating much despite taking Lasix.  No chest pain, palpitations, syncope, cough, fever or  sick contacts at home.    1. Endstage CHF  -recent echo with EF 20-25%  -barriers with GDMT as patient's BP is low  -diuresis with IV lasix as patient BP tolerates  -started on midodrine  -c/w Coreg, Isordil  -of note, has AICD  -discussed option of hospice which patient is not ready for at this time    Process of Care   --Reviewed dx/treatment problems and alignment with Goals of Care     Physical Aspects of Care   --Pain   patient denies at this time   c/w current management     --Bowel Regimen   denies constipation   risk for constipation d/t immobility   reports occasional loose stools  daily dulcolax as needed     --Dyspnea   No SOB at this time, on room air  comfortable and in NAD     --Nausea Vomiting   denies     --Weakness   PT as tolerated    OOB as tolerated        Psychological and Psychiatric Aspects of Care:    --Grief/ Bereavement: emotional support provided   --Hx of psychiatric dx: none   --Pastoral Care Available PRN         Social Aspects of Care   --SW involved          Cultural Aspects   --Primary Language: English           Goals of Care: Discussed patient's advanced, end-stage HF with option of transitioning to hospice, patient not ready for at this time. Discussed MOLST with recommendations for DNR/DNI given patient's advanced end stage HF. NO limits set at this time, palliative to follow up.          We discussed Palliative Care team being a supportive team when a patient has ongoing illnesses.  We also discussed that it is not an end of life care service, but can help navigate symptoms and emotional support throughout their hospital stay here.           Ethical and Legal Aspects: NA           Capacity- A&Ox4, has medical decision making capacity       HCP/Surrogate:  Sobeida Tamayo, spouse [phone]      Code Status: Full code    MOLST:     Dispo Plan: home with home care vs. [facility]          Discussed With: Dr. Askew coordinated with attending and SW and RN            Time Spent: 75 minutes including the care, coordination and counseling of this patient, 50% of which was spent coordinating and counseling.

## 2023-12-06 NOTE — DISCHARGE NOTE PROVIDER - NSDCCAREPROVSEEN_GEN_ALL_CORE_FT
Shirlene Carrizales (Palliative Medicine)  Herson Almanzar (Cardiology)  Debra Damian (Medicine)  Tyrel Mcgee (Medicine)

## 2023-12-06 NOTE — PROGRESS NOTE ADULT - SUBJECTIVE AND OBJECTIVE BOX
CHIEF COMPLAINT: Patient is a 89y old  Male who presents with a chief complaint of Dyspnea (04 Dec 2023 15:47)      HPI:  89 y/o M w/ PMH of HTN, CAD, v-tach, systolic CHF, AICD placement, hypothyroidism, gout, prostate cancer s/p seed therapy p/w dyspnea the past few days. Patient compliant with lasix in discussed with Dr Almanzar with adjusting his lasix dose. Patient was Sent in today for weight gain,  dyspnea on exertion andedema stomacho down.  Patient not urinating much despite taking Lasix.  No chest pain, palpitations, syncope, cough, fever or  sick contacts at home.    In the ER, Patient recevied IV lasix.         feeling better since IV lasix    started midodrine for hypotension        (04 Dec 2023 15:47)      PMHx: PAST MEDICAL & SURGICAL HISTORY:  Hypertension      H/O ventricular tachycardia  PPM placed 2008      Myocardial infarction  54 years old      Prostate cancer      Implantable cardioverter-defibrillator (ICD) in situ  2008      History of tonsillectomy  as child      Elective surgery  right middle finger surgery, no hardware      H/O pilonidal cyst      H/O prostate biopsy  cancer treated with prostate seeding            Soc Hx:        Allergies: Allergies    mexiletine (Unknown)  statins (Rash)  dexamethasone (Hives)    Intolerances        Vital Signs Last 24 Hrs  T(C): 36.3 (05 Dec 2023 05:16), Max: 36.6 (04 Dec 2023 19:00)  T(F): 97.4 (05 Dec 2023 05:16), Max: 97.8 (04 Dec 2023 19:00)  HR: 59 (05 Dec 2023 05:16) (59 - 100)  BP: 103/65 (05 Dec 2023 05:16) (85/61 - 114/82)  BP(mean): 75 (04 Dec 2023 17:37) (75 - 87)  RR: 18 (05 Dec 2023 05:16) (18 - 18)  SpO2: 97% (05 Dec 2023 05:16) (93% - 99%)    Parameters below as of 05 Dec 2023 05:16  Patient On (Oxygen Delivery Method): room air        I&O's Summary    04 Dec 2023 07:01  -  05 Dec 2023 07:00  --------------------------------------------------------  IN: 0 mL / OUT: 350 mL / NET: -350 mL            PHYSICAL EXAM:   Constitutional: NAD, awake and alert, well-developed  Respiratory: basilar crackles   Cardiovascular: S1 and S2, irregular rate and rhythm, KATLYN    Extremities: 2-3+ peripheral edema  Vascular: 2+ peripheral pulses  Neurological: A/O x 3, no focal deficits  Musculoskeletal: 5/5 strength b/l upper and lower extremities          MEDICATIONS  (STANDING):  allopurinol 100 milliGRAM(s) Oral two times a day  aMIOdarone    Tablet 200 milliGRAM(s) Oral daily  aspirin enteric coated 81 milliGRAM(s) Oral daily  carvedilol 9.375 milliGRAM(s) Oral every 12 hours  cholecalciferol 1000 Unit(s) Oral daily  cyanocobalamin 1000 MICROGram(s) Oral daily  furosemide   Injectable 20 milliGRAM(s) IV Push every 8 hours  heparin   Injectable 5000 Unit(s) SubCutaneous every 12 hours  influenza  Vaccine (HIGH DOSE) 0.7 milliLiter(s) IntraMuscular once  isosorbide   dinitrate Tablet (ISORDIL) 5 milliGRAM(s) Oral two times a day  levothyroxine 187.5 MICROGram(s) Oral <User Schedule>  levothyroxine 125 MICROGram(s) Oral <User Schedule>  midodrine. 2.5 milliGRAM(s) Oral three times a day    MEDICATIONS  (PRN):  acetaminophen     Tablet .. 650 milliGRAM(s) Oral every 6 hours PRN Mild Pain (1 - 3)        LABS: All Labs Reviewed:                                        12.3   7.98  )-----------( 196      ( 05 Dec 2023 07:01 )             36.6   12-05    140  |  104  |  47<H>  ----------------------------<  106<H>  3.7   |  29  |  1.94<H>    Ca    9.0      05 Dec 2023 07:01  Phos  2.9     12-05  Mg     2.5     12-05    TPro  6.4  /  Alb  2.8<L>  /  TBili  2.0<H>  /  DBili  x   /  AST  34  /  ALT  31  /  AlkPhos  157<H>  12-04        EKG:< from: 12 Lead ECG (12.04.23 @ 11:03) >    Diagnosis Line Ventricular-paced rhythm  Abnormal ECG  When compared with ECG of 09-MAY-2023 07:28,  Premature ventricular complexes are no longer Present  Vent. rate has decreased BY   4 BPM  Confirmed by Palla MD, Lucian ([id number]) on 12/4/2023 9:57:05 PM    < end of copied text >      Telemetry: V paced       < from: TTE Echo Complete w/o Contrast w/ Doppler (12.05.23 @ 14:23) >     Summary     Left ventricle is dilated and systolic function appears severely   impaired;   segmental wall motion abnormalities noted. Estimated Ejection Fraction is   20-25%.   3D heart model shows EF 20-25%   Findings similar to prior studies dated 12/17/2020 and 05/09/2023   The left atrium is mildly dilated.   A device wire is seen in the RV and RA.   Mild aortic sclerosis is present with minimally restricted valvular   opening.   There is calcification of both mitral valve leaflets. The leaflet opening   appears normal.   Moderate mitral regurgitation is present.   Moderate Tricuspid regurgitation is present. Severe pulmonary   hypertension.   Normal appearing pulmonic valve structure and function.   Mild pulmonic valvular regurgitation (1+) is present.   Pleural effusion - is present.   The IVC is dilated with decreased respiratory variation.     Signature     ----------------------------------------------------------------   Electronically signed by Boy Waldron MD(Interpreting   physician) on 12/05/2023 08:03 PM   ----------------------------------------------------------------    < end of copied text >

## 2023-12-06 NOTE — PROGRESS NOTE ADULT - SUBJECTIVE AND OBJECTIVE BOX
HPI: Pt is a 89y old Male with hx of HTN, CAD, v-tach, systolic CHF, AICD placement, hypothyroidism, gout, prostate cancer s/p seed therapy p/w dyspnea the past few days. Patient compliant with lasix in discussed with Dr Almanzar with adjusting his lasix dose. Patient was Sent in today for weight gain,  dyspnea on exertion andedema stomacho down.  Patient not urinating much despite taking Lasix.  No chest pain, palpitations, syncope, cough, fever or  sick contacts at home. Palliative consulted for GOC.    : Seen and examined with wife at bedside. Awake, alert, very pleasant in NAD. Denies pain and SOB, on room air. Discussed GOC with patient and wife.  :       CODE STATUS: Full code      Pain and Dyspnea:       Review of Systems:                PHYSICAL EXAM:    Vital Signs Last 24 Hrs  T(C): 36.2 (06 Dec 2023 08:22), Max: 36.4 (05 Dec 2023 20:20)  T(F): 97.2 (06 Dec 2023 08:22), Max: 97.6 (05 Dec 2023 20:20)  HR: 62 (06 Dec 2023 08:22) (60 - 67)  BP: 105/63 (06 Dec 2023 08:22) (84/55 - 120/49)  BP(mean): --  RR: 18 (06 Dec 2023 08:22) (18 - 18)  SpO2: 98% (06 Dec 2023 08:22) (97% - 98%)    Parameters below as of 06 Dec 2023 08:22  Patient On (Oxygen Delivery Method): room air      Daily     Daily Weight in k.9 (06 Dec 2023 06:41)    PPSV2:   40%  FAST:    General:  HEENT:  Lungs:  Cardiac:  GI:  :  Ext:  Neuro:      LABS and RADIOLOGY: REVIEWED HPI: Pt is a 89y old Male with hx of HTN, CAD, v-tach, systolic CHF, AICD placement, hypothyroidism, gout, prostate cancer s/p seed therapy p/w dyspnea the past few days. Patient compliant with lasix in discussed with Dr Almanzar with adjusting his lasix dose. Patient was Sent in today for weight gain,  dyspnea on exertion andedema stomacho down.  Patient not urinating much despite taking Lasix.  No chest pain, palpitations, syncope, cough, fever or  sick contacts at home. Palliative consulted for GOC.    : Seen and examined with wife at bedside. Awake, alert, very pleasant in NAD. Denies pain and SOB, on room air. Discussed GOC with patient and wife.  : Seen and examined at beside. Wife at bedside. Awake, alert, in NAD. Denies pain and SOB. Hoping for discharge home later today.      CODE STATUS: Full code      Pain and Dyspnea:     denies pain  denies dyspnea, on room air in NAD      Review of Systems:    Anxiety- denies  Depression- denies  Physical Discomfort- denies  Dyspnea- denies  Constipation- ++ at times  Diarrhea- ++ at times  Nausea- denies  Vomiting- denies  Anorexia- ++  Weight Loss-   Cough- denies  Secretions- denies  Fatigue- denies  Weakness- ++  Delirium- denies    All other systems reviewed and negative  Unable to obtain/Limited due to:      PHYSICAL EXAM:    Vital Signs Last 24 Hrs  T(C): 36.2 (06 Dec 2023 08:22), Max: 36.4 (05 Dec 2023 20:20)  T(F): 97.2 (06 Dec 2023 08:22), Max: 97.6 (05 Dec 2023 20:20)  HR: 62 (06 Dec 2023 08:22) (60 - 67)  BP: 105/63 (06 Dec 2023 08:22) (84/55 - 120/49)  BP(mean): --  RR: 18 (06 Dec 2023 08:22) (18 - 18)  SpO2: 98% (06 Dec 2023 08:22) (97% - 98%)    Parameters below as of 06 Dec 2023 08:22  Patient On (Oxygen Delivery Method): room air      Daily     Daily Weight in k.9 (06 Dec 2023 06:41)    PPSV2:  40%      General: Awake, alert, pleasant/conversational. In NAD.  Mental Status: A&Ox4.  HEENT: Normocephalic, atraumatic. Conjunctivae clear, EOM intact, PERRL.  Lungs: CTA bilaterally.  Cardiac: PPM/AICD  GI: Abdomen soft, slightly distended. +BSx4.  : No suprapubic tenderness.  Ext: Trace lower extremity edema.  Neuro: A&OX4. Speech intact. No focal neuro deficits.        LABS and RADIOLOGY: REVIEWED

## 2023-12-06 NOTE — DISCHARGE NOTE PROVIDER - CARE PROVIDER_API CALL
Herson Almanzar  Cardiovascular Disease  [address], [address]  [place] [address]  Phone: [phone]  Fax: [phone]  Follow Up Time: 1-3 days    Aris Jackson.  Cardiovascular Disease  [address] [place] [address]  Phone: [phone]  Fax: [phone]  Scheduled Appointment: 12/12/2023   Herson Almanzar  Cardiovascular Disease  [address], [address]  [place] [address]  Phone: [phone]  Fax: [phone]  Follow Up Time: 1-3 days    Aris Jackson.  Cardiovascular Disease  [address] [place] [address]  Phone: [phone]  Fax: [phone]  Scheduled Appointment: 12/12/2023

## 2023-12-06 NOTE — DISCHARGE NOTE NURSING/CASE MANAGEMENT/SOCIAL WORK - PATIENT PORTAL LINK FT
You can access the FollowMyHealth Patient Portal offered by [facility] by registering at the following website: [email or web address]. By joining [employer]’s FollowMyHealth portal, you will also be able to view your health information using other applications (apps) compatible with our system. You can access the FollowMyHealth Patient Portal offered by [facility] by registering at the following website: [email or web address]. By joining [employer]’s FollowMyHealth portal, you will also be able to view your health information using other applications (apps) compatible with our system.

## 2023-12-06 NOTE — PROGRESS NOTE ADULT - CONVERSATION DETAILS
Met with patient and patient's spouse at bedside to discuss advanced directives and GOC.    During out conversation yesterday, patient had expressed wishes of DNR/DNI, but he decided, along with his spouse, that this is something that they want to speak further about. They requested our team to follow up. Discussed patient's wishes regarding CPR/MV vs. DNR/DNI. Patient has a good understanding of his advanced heart failure, and verbalized understanding that if he were to undergo resuscitation in the even of a cardiac arrest, he stated "what good would come out of it?, a few more minutes or hour of life?" further stating that he does not feel that resuscitation would be worth the effort. Discussed recommendations of DNR/DNI with trial of NIV. Both patient and wife would like to consider, and do not want to set limits at this time. Spouse requested copy of MOLST to take home to review. Explained to patient and wife that MOLST form can be completed outpatient with patient's PCP or cardiologist. No limits in care set at this time, patient remains full code.

## 2023-12-12 PROBLEM — I50.22 CHRONIC SYSTOLIC CONGESTIVE HEART FAILURE: Status: ACTIVE | Noted: 2023-01-01

## 2023-12-12 PROBLEM — E03.9 HYPOTHYROIDISM, UNSPECIFIED TYPE: Status: ACTIVE | Noted: 2023-01-01

## 2023-12-12 PROBLEM — I95.9 HYPOTENSION: Status: ACTIVE | Noted: 2023-01-01

## 2024-01-01 ENCOUNTER — NON-APPOINTMENT (OUTPATIENT)
Age: 89
End: 2024-01-01

## 2024-01-01 ENCOUNTER — APPOINTMENT (OUTPATIENT)
Dept: ELECTROPHYSIOLOGY | Facility: CLINIC | Age: 89
End: 2024-01-01
Payer: MEDICARE

## 2024-01-01 PROCEDURE — 93296 REM INTERROG EVL PM/IDS: CPT

## 2024-01-01 PROCEDURE — 93295 DEV INTERROG REMOTE 1/2/MLT: CPT

## 2024-05-09 NOTE — ED ADULT NURSE NOTE - NS ED NURSE LEVEL OF CONSCIOUSNESS SPEECH
Followup with dr MEJIA - to discuss possible CT sinuses and formal allergy testing    Flonase if needed    Antibiotic  Avoid taking vitamins or supplements within 2 hours of taking antibiotc    Recommendation to increase fluids - particularly water, rest, saline nasal spray and humidified air    Yogurt while on antibiotic.     Speaking Coherently

## 2024-06-12 ENCOUNTER — APPOINTMENT (OUTPATIENT)
Dept: ELECTROPHYSIOLOGY | Facility: CLINIC | Age: 89
End: 2024-06-12

## 2024-08-04 NOTE — PATIENT PROFILE ADULT - FUNCTIONAL SCREEN CURRENT LEVEL: COMMUNICATION, MLM
Laps, needles and instruments count was reported as correct. 0 = understands/communicates without difficulty

## 2024-09-10 ENCOUNTER — APPOINTMENT (OUTPATIENT)
Dept: ELECTROPHYSIOLOGY | Facility: CLINIC | Age: 89
End: 2024-09-10

## 2025-04-20 NOTE — PROGRESS NOTE ADULT - ASSESSMENT
#Acute on chronic HFrEF  #Associated appropriate Hypotension with reduced ejection fraction (asymptomatic)  #AICD/PPM  #CKD Stage III  admit to telemetry  serial trop negative  daily weight   I/O  IV lasix initially-> return to baseline respiratory status  coreg 9.375 BID w parameters  TTE  Discussed with patient's cardiologist. Cr sensitive with diuresis. Will try Farixga (may require prior authorization) for heart failure and gauge effect. Card recs appreciated.     #CAD  asa 325 mg      #Hx VT  amiodarone 200 mg qd      #Gout  allopurinol 100 mg BID      #Hypothyroid  levothyroxine    DVT Prophylaxis: Heparin subq No

## 2025-05-22 NOTE — ED ADULT NURSE NOTE - BREATHING, MLM
Was this filled out, signed and faxed?    Kim Hernandez [facility]  Endo Clinic Liaison  [facility]  [email or web address]   [email or web address]  Phone: [phone]  Fax: [phone]     Spontaneous, unlabored and symmetrical

## 2025-06-24 NOTE — DISCHARGE NOTE PROVIDER - PROVIDER RX CONTACT NUMBER
[phone]
Abdomen soft, non-tender and non-distended, no rebound, no guarding and no masses. no hepatosplenomegaly.

## 2025-07-29 NOTE — DISCHARGE NOTE PROVIDER - NSDCHOSPICE_GEN_A_CORE
Pt seen in office last week with same complaint  More gastric tightness that does not radiate to chest  Trops neg, EKG wnl  Patient can be dced with outpt cardiac workup No